# Patient Record
Sex: MALE | Race: WHITE | NOT HISPANIC OR LATINO | Employment: OTHER | URBAN - METROPOLITAN AREA
[De-identification: names, ages, dates, MRNs, and addresses within clinical notes are randomized per-mention and may not be internally consistent; named-entity substitution may affect disease eponyms.]

---

## 2017-01-04 ENCOUNTER — LAB VISIT (OUTPATIENT)
Dept: LAB | Facility: HOSPITAL | Age: 67
End: 2017-01-04
Attending: UROLOGY
Payer: COMMERCIAL

## 2017-01-04 DIAGNOSIS — R97.20 ELEVATED PSA: ICD-10-CM

## 2017-01-04 LAB — COMPLEXED PSA SERPL-MCNC: 3.2 NG/ML

## 2017-01-04 PROCEDURE — 36415 COLL VENOUS BLD VENIPUNCTURE: CPT | Mod: PO

## 2017-01-04 PROCEDURE — 84153 ASSAY OF PSA TOTAL: CPT

## 2017-01-18 ENCOUNTER — HOSPITAL ENCOUNTER (OUTPATIENT)
Dept: RADIOLOGY | Facility: HOSPITAL | Age: 67
Discharge: HOME | End: 2017-01-18
Attending: UROLOGY
Payer: COMMERCIAL

## 2017-01-18 ENCOUNTER — OFFICE VISIT (OUTPATIENT)
Dept: UROLOGY | Facility: CLINIC | Age: 67
End: 2017-01-18
Payer: COMMERCIAL

## 2017-01-18 VITALS
SYSTOLIC BLOOD PRESSURE: 124 MMHG | DIASTOLIC BLOOD PRESSURE: 76 MMHG | WEIGHT: 174.38 LBS | BODY MASS INDEX: 26.51 KG/M2

## 2017-01-18 DIAGNOSIS — Z12.5 PROSTATE CANCER SCREENING: ICD-10-CM

## 2017-01-18 DIAGNOSIS — Z12.5 PROSTATE CANCER SCREENING: Primary | ICD-10-CM

## 2017-01-18 LAB
BILIRUB SERPL-MCNC: NORMAL MG/DL
BLOOD URINE, POC: NORMAL
COLOR, POC UA: NORMAL
GLUCOSE UR QL STRIP: NORMAL
KETONES UR QL STRIP: NORMAL
LEUKOCYTE ESTERASE URINE, POC: NORMAL
NITRITE, POC UA: NORMAL
PH, POC UA: 6
PROTEIN, POC: NORMAL
SPECIFIC GRAVITY, POC UA: 1.01
UROBILINOGEN, POC UA: NORMAL

## 2017-01-18 PROCEDURE — 99214 OFFICE O/P EST MOD 30 MIN: CPT | Mod: 25,S$GLB,, | Performed by: UROLOGY

## 2017-01-18 PROCEDURE — 1126F AMNT PAIN NOTED NONE PRSNT: CPT | Mod: S$GLB,,, | Performed by: UROLOGY

## 2017-01-18 PROCEDURE — 81002 URINALYSIS NONAUTO W/O SCOPE: CPT | Mod: S$GLB,,, | Performed by: UROLOGY

## 2017-01-18 PROCEDURE — 99999 PR PBB SHADOW E&M-EST. PATIENT-LVL II: CPT | Mod: PBBFAC,,, | Performed by: UROLOGY

## 2017-01-18 PROCEDURE — 1160F RVW MEDS BY RX/DR IN RCRD: CPT | Mod: S$GLB,,, | Performed by: UROLOGY

## 2017-01-18 PROCEDURE — 1159F MED LIST DOCD IN RCRD: CPT | Mod: S$GLB,,, | Performed by: UROLOGY

## 2017-01-18 PROCEDURE — 1157F ADVNC CARE PLAN IN RCRD: CPT | Mod: S$GLB,,, | Performed by: UROLOGY

## 2017-01-18 PROCEDURE — 74000 XR ABDOMEN AP 1 VIEW: CPT | Mod: TC

## 2017-01-18 PROCEDURE — 3078F DIAST BP <80 MM HG: CPT | Mod: S$GLB,,, | Performed by: UROLOGY

## 2017-01-18 PROCEDURE — 3074F SYST BP LT 130 MM HG: CPT | Mod: S$GLB,,, | Performed by: UROLOGY

## 2017-01-18 PROCEDURE — 74000 XR ABDOMEN AP 1 VIEW: CPT | Mod: 26,,, | Performed by: RADIOLOGY

## 2017-01-18 RX ORDER — ASPIRIN 81 MG/1
81 TABLET ORAL DAILY
COMMUNITY

## 2017-01-18 NOTE — PROGRESS NOTES
Chief Complaint: Annual prostate cancer screening    HPI:   1/18/17: 65 yo pt of Dr. Valentino here for annual exam.  Feels well no complaints.  PSA has bumped at times, no biopsies.  Had a kidney stone 10 years ago he passed on his own.  Had other stones before that.    Allergies:  Review of patient's allergies indicates no known allergies.    Medications:  has a current medication list which includes the following prescription(s): aspirin, atorvastatin, gemfibrozil, and lisinopril.    Review of Systems:  General: No fever, chills, fatigability, or weight loss.  Skin: No rashes, itching, or changes in color or texture of skin.  Chest: Denies LOUISE, cyanosis, wheezing, cough, and sputum production.  Abdomen: Appetite fine. No weight loss. Denies diarrhea, abdominal pain, hematemesis, or blood in stool.  Musculoskeletal: No joint stiffness or swelling. Denies back pain.  : As above.  All other review of systems negative.    PMH:   has a past medical history of Colon polyp; Elevated PSA; HLD (hyperlipidemia); HTN (hypertension); and Kidney stone (2006).    PSH:   has a past surgical history that includes Colonoscopy (N/A, 3/28/2016).    FamHx: family history is negative for Cancer, Diabetes, Heart disease, and Prostate cancer.    SocHx:  reports that he has never smoked. He does not have any smokeless tobacco history on file. He reports that he drinks alcohol. He reports that he does not use illicit drugs.      Physical Exam:  Vitals:    01/18/17 0845   BP: 124/76     General: A&Ox3, no apparent distress, no deformities  Neck: No masses, normal thyroid  Lungs: normal inspiration, no use of accessory muscles  Heart: normal pulse, no arrhythmias  Abdomen: Soft, NT, ND  Skin: The skin is warm and dry. No jaundice.  Ext: No c/c/e.  : Test desc freddy, no abnormalities of epididymus. Penis normal, with normal penile and scrotal skin. Meatus normal. Normal rectal tone, no hemorrhoids. Prost 40 gm no nodules or masses  appreciated. SV not palpable. Perineum and anus normal.    Labs/Studies:   Urinalysis performed in clinic, summary: UA normal  PSA    1/17: 3.2    Impression/Plan:   1. Low risk of prostate cancer.  PSA/RTC 12 mo  2. KUB/US to screen for possible kidney stones.

## 2017-01-18 NOTE — MR AVS SNAPSHOT
[facility]  [address]  [place] [address]  Phone: [phone]  Fax: [phone]                  Rahul Yip   2017 8:20 AM   Office Visit    Description:  Male : 1950   Provider:  Wes Dukes IV, MD   [facility]           Diagnoses this Visit        Comments    Prostate cancer screening    -  Primary            To Do List           Future Appointments        Provider Department Dept Phone    2017 9:45 AM ONLH XR1-DR [facility] [phone]    2017 10:45 AM SUMH US1 [facility] [phone]    2018 9:30 AM LABORATORY, [place] [facility] [phone]    2018 8:40 AM Wes Dukes IV, MD [facility] [phone]      Goals (5 Years of Data)     None      Follow-Up and Disposition     Return in about 1 year (around 2018).    Follow-up and Disposition History      [facility]     [facility] Care [facility] -  Assistance  Registered nurses in the [facility] provide clinical advisement, health education, appointment booking, and other advisory services.  Call for this free service at [phone].             Medications           Message regarding Medications     Verify the changes and/or additions to your medication regime listed below are the same as discussed with your clinician today.  If any of these changes or additions are incorrect, please notify your healthcare provider.             Verify that the below list of medications is an accurate representation of the medications you are currently taking.  If none reported, the list may be blank. If incorrect, please contact your healthcare provider. Carry this list with you in case of emergency.           Current Medications     aspirin (ECOTRIN) 81 MG EC tablet Take 81 mg by mouth once daily.    atorvastatin (LIPITOR) 40 MG tablet Take 0.5 tablets (20 mg total) by mouth  once daily.    gemfibrozil (LOPID) 600 MG tablet TAKE ONE BY MOUTH TWO TIMES A DAY.    lisinopril 10 MG tablet TAKE ONE BY MOUTH EVERY DAY           Clinical Reference Information           Vital Signs - Last Recorded  Most recent update: 1/18/2017  8:45 AM by Angelina Mi LPN    BP Wt BMI          124/76 79.1 kg (174 lb 6.1 oz) 26.51 kg/m2        Blood Pressure          Most Recent Value    BP  124/76      Allergies as of 1/18/2017     No Known Allergies      Immunizations Administered on Date of Encounter - 1/18/2017     None      Orders Placed During Today's Visit      Normal Orders This Visit    POCT urine dipstick without microscope     Future Labs/Procedures Expected by Expires    US Retroperitoneal Complete (Kidney and  1/18/2017 1/18/2018    X-Ray Abdomen AP 1 View  1/18/2017 1/18/2018    PSA, Screening  1/18/2018 3/19/2018         1/18/2017  9:19 AM - Antonina Zayas LPN      Component Results     Component    Color, UA    yellow, clear    Spec Grav, UA    1.015    pH, UA    6    WBC, UA    neg    Nitrite, UA    neg    Protein, UA    neg    Glucose, UA    neg    Ketones, UA    neg    Urobilinogen, UA    neg    Bilirubin, UA    neg    Blood, UA    neg            [facility] Sign-Up     Activating your [facility] account is as easy as 1-2-3!     1) Visit [email or web address], select Sign Up Now, enter this activation code and your date of birth, then select Next.  [email or web address]  Expires: 3/4/2017  9:19 AM      2) Create a username and password to use when you visit [facility] in the future and select a security question in case you lose your password and select Next.    3) Enter your e-mail address and click Sign Up!    Additional Information  If you have questions, please e-mail [email or web address] or call [phone] to talk to our [facility] staff. Remember, [facility] is NOT to be used for urgent needs. For medical emergencies, dial 911.

## 2017-01-19 ENCOUNTER — TELEPHONE (OUTPATIENT)
Dept: RADIOLOGY | Facility: HOSPITAL | Age: 67
End: 2017-01-19

## 2017-01-20 ENCOUNTER — HOSPITAL ENCOUNTER (OUTPATIENT)
Dept: RADIOLOGY | Facility: HOSPITAL | Age: 67
Discharge: HOME | End: 2017-01-20
Attending: UROLOGY
Payer: COMMERCIAL

## 2017-01-20 DIAGNOSIS — Z12.5 PROSTATE CANCER SCREENING: ICD-10-CM

## 2017-01-20 PROCEDURE — 76770 US EXAM ABDO BACK WALL COMP: CPT | Mod: 26,,, | Performed by: RADIOLOGY

## 2017-01-20 PROCEDURE — 76770 US EXAM ABDO BACK WALL COMP: CPT | Mod: TC,PO

## 2017-01-23 ENCOUNTER — TELEPHONE (OUTPATIENT)
Dept: UROLOGY | Facility: CLINIC | Age: 67
End: 2017-01-23

## 2017-01-23 DIAGNOSIS — N13.30 HYDRONEPHROSIS, UNSPECIFIED HYDRONEPHROSIS TYPE: Primary | ICD-10-CM

## 2017-01-23 NOTE — TELEPHONE ENCOUNTER
Attempted to contact pt to inform him of US results per Dr. Dukes; pt will need CT RSS and RTC appt; no answer.  Msg left on voice mail.

## 2017-01-23 NOTE — TELEPHONE ENCOUNTER
US shows some left sided blockage but doesn't show what the cause is.  CT RSS ordered with RTC after

## 2017-02-19 RX ORDER — GEMFIBROZIL 600 MG/1
TABLET, FILM COATED ORAL
Qty: 60 TABLET | Refills: 11 | Status: SHIPPED | OUTPATIENT
Start: 2017-02-19 | End: 2017-02-20 | Stop reason: SDUPTHER

## 2017-02-20 RX ORDER — GEMFIBROZIL 600 MG/1
600 TABLET, FILM COATED ORAL
Qty: 60 TABLET | Refills: 11 | Status: SHIPPED | OUTPATIENT
Start: 2017-02-20 | End: 2018-09-20

## 2017-02-20 NOTE — TELEPHONE ENCOUNTER
----- Message from Yolanda Meza sent at 2/20/2017 11:47 AM CST -----  Contact: Gris Funes, Pharmacy  Ms Gris is requesting a refill on  Lopid 600mg, please call her back at [phone]. Thank you

## 2017-02-28 ENCOUNTER — TELEPHONE (OUTPATIENT)
Dept: RADIOLOGY | Facility: HOSPITAL | Age: 67
End: 2017-02-28

## 2017-03-01 ENCOUNTER — HOSPITAL ENCOUNTER (OUTPATIENT)
Dept: RADIOLOGY | Facility: HOSPITAL | Age: 67
Discharge: HOME | End: 2017-03-01
Attending: UROLOGY
Payer: COMMERCIAL

## 2017-03-01 DIAGNOSIS — N13.30 HYDRONEPHROSIS, UNSPECIFIED HYDRONEPHROSIS TYPE: ICD-10-CM

## 2017-03-01 PROCEDURE — 74176 CT ABD & PELVIS W/O CONTRAST: CPT | Mod: TC,PO

## 2017-03-01 PROCEDURE — 74176 CT ABD & PELVIS W/O CONTRAST: CPT | Mod: 26,,, | Performed by: RADIOLOGY

## 2017-03-17 RX ORDER — LISINOPRIL 10 MG/1
TABLET ORAL
Qty: 30 TABLET | Refills: 5 | Status: SHIPPED | OUTPATIENT
Start: 2017-03-17 | End: 2017-10-05 | Stop reason: SDUPTHER

## 2017-04-26 RX ORDER — ATORVASTATIN CALCIUM 40 MG/1
TABLET, FILM COATED ORAL
Qty: 45 TABLET | Refills: 4 | Status: SHIPPED | OUTPATIENT
Start: 2017-04-26 | End: 2018-05-30 | Stop reason: SDUPTHER

## 2017-10-06 RX ORDER — LISINOPRIL 10 MG/1
TABLET ORAL
Qty: 30 TABLET | Refills: 0 | Status: SHIPPED | OUTPATIENT
Start: 2017-10-06 | End: 2018-09-20 | Stop reason: SDUPTHER

## 2017-10-06 RX ORDER — LISINOPRIL 10 MG/1
TABLET ORAL
Qty: 30 TABLET | Refills: 0 | Status: SHIPPED | OUTPATIENT
Start: 2017-10-06 | End: 2017-11-08 | Stop reason: SDUPTHER

## 2017-11-08 RX ORDER — LISINOPRIL 10 MG/1
TABLET ORAL
Qty: 30 TABLET | Refills: 0 | Status: SHIPPED | OUTPATIENT
Start: 2017-11-08 | End: 2017-12-11 | Stop reason: SDUPTHER

## 2017-12-11 RX ORDER — LISINOPRIL 10 MG/1
TABLET ORAL
Qty: 30 TABLET | Refills: 0 | Status: SHIPPED | OUTPATIENT
Start: 2017-12-11 | End: 2018-01-18 | Stop reason: SDUPTHER

## 2018-01-18 RX ORDER — LISINOPRIL 10 MG/1
TABLET ORAL
Qty: 30 TABLET | Refills: 0 | Status: SHIPPED | OUTPATIENT
Start: 2018-01-18 | End: 2018-02-20 | Stop reason: SDUPTHER

## 2018-01-19 ENCOUNTER — LAB VISIT (OUTPATIENT)
Dept: LAB | Facility: HOSPITAL | Age: 68
End: 2018-01-19
Attending: UROLOGY
Payer: COMMERCIAL

## 2018-01-19 DIAGNOSIS — Z12.5 PROSTATE CANCER SCREENING: ICD-10-CM

## 2018-01-19 LAB — COMPLEXED PSA SERPL-MCNC: 5.3 NG/ML

## 2018-01-19 PROCEDURE — 36415 COLL VENOUS BLD VENIPUNCTURE: CPT | Mod: PO

## 2018-01-19 PROCEDURE — 84153 ASSAY OF PSA TOTAL: CPT

## 2018-01-24 ENCOUNTER — OFFICE VISIT (OUTPATIENT)
Dept: UROLOGY | Facility: CLINIC | Age: 68
End: 2018-01-24
Payer: COMMERCIAL

## 2018-01-24 ENCOUNTER — LAB VISIT (OUTPATIENT)
Dept: LAB | Facility: HOSPITAL | Age: 68
End: 2018-01-24
Attending: UROLOGY
Payer: COMMERCIAL

## 2018-01-24 VITALS — WEIGHT: 175 LBS | SYSTOLIC BLOOD PRESSURE: 124 MMHG | DIASTOLIC BLOOD PRESSURE: 78 MMHG | BODY MASS INDEX: 26.61 KG/M2

## 2018-01-24 DIAGNOSIS — N13.30 HYDRONEPHROSIS, UNSPECIFIED HYDRONEPHROSIS TYPE: ICD-10-CM

## 2018-01-24 DIAGNOSIS — Z12.5 PROSTATE CANCER SCREENING: Primary | ICD-10-CM

## 2018-01-24 DIAGNOSIS — Z12.5 PROSTATE CANCER SCREENING: ICD-10-CM

## 2018-01-24 LAB
BILIRUB SERPL-MCNC: NORMAL MG/DL
BLOOD URINE, POC: NORMAL
COLOR, POC UA: YELLOW
COMPLEXED PSA SERPL-MCNC: 4.9 NG/ML
CREAT SERPL-MCNC: 1 MG/DL
EST. GFR  (AFRICAN AMERICAN): >60 ML/MIN/1.73 M^2
EST. GFR  (NON AFRICAN AMERICAN): >60 ML/MIN/1.73 M^2
GLUCOSE UR QL STRIP: NORMAL
KETONES UR QL STRIP: NORMAL
LEUKOCYTE ESTERASE URINE, POC: NORMAL
NITRITE, POC UA: NORMAL
PH, POC UA: 6
PROTEIN, POC: NORMAL
SPECIFIC GRAVITY, POC UA: 1.01
UROBILINOGEN, POC UA: NORMAL

## 2018-01-24 PROCEDURE — 81002 URINALYSIS NONAUTO W/O SCOPE: CPT | Mod: S$GLB,,, | Performed by: UROLOGY

## 2018-01-24 PROCEDURE — 36415 COLL VENOUS BLD VENIPUNCTURE: CPT

## 2018-01-24 PROCEDURE — 99214 OFFICE O/P EST MOD 30 MIN: CPT | Mod: 25,S$GLB,, | Performed by: UROLOGY

## 2018-01-24 PROCEDURE — 99999 PR PBB SHADOW E&M-EST. PATIENT-LVL II: CPT | Mod: PBBFAC,,, | Performed by: UROLOGY

## 2018-01-24 PROCEDURE — 82565 ASSAY OF CREATININE: CPT

## 2018-01-24 PROCEDURE — 84153 ASSAY OF PSA TOTAL: CPT

## 2018-01-24 NOTE — PROGRESS NOTES
Chief Complaint: Annual prostate cancer screening    HPI:   1/24/18: CT showed a left hydronephrosis with no obstructing UPJ stone, possibly congenital, possibly changes from stones.  No sig stones now to treat.  1/18/17: 65 yo pt of Dr. Valentino here for annual exam.  Feels well no complaints.  PSA has bumped at times, no biopsies.  Had a kidney stone 10 years ago he passed on his own.  Had other stones before that.    Allergies:  Patient has no known allergies.    Medications:  has a current medication list which includes the following prescription(s): aspirin, atorvastatin, gemfibrozil, lisinopril, and lisinopril.    Review of Systems:  General: No fever, chills, fatigability, or weight loss.  Skin: No rashes, itching, or changes in color or texture of skin.  Chest: Denies LOUISE, cyanosis, wheezing, cough, and sputum production.  Abdomen: Appetite fine. No weight loss. Denies diarrhea, abdominal pain, hematemesis, or blood in stool.  Musculoskeletal: No joint stiffness or swelling. Denies back pain.  : As above.  All other review of systems negative.    PMH:   has a past medical history of Colon polyp; Elevated PSA; HLD (hyperlipidemia); HTN (hypertension); and Kidney stone (2006).    PSH:   has a past surgical history that includes Colonoscopy (N/A, 3/28/2016).    FamHx: family history is not on file.    SocHx:  reports that he has never smoked. He does not have any smokeless tobacco history on file. He reports that he drinks alcohol. He reports that he does not use drugs.      Physical Exam:  Vitals:    01/24/18 0840   BP: 124/78     General: A&Ox3, no apparent distress, no deformities  Neck: No masses, normal thyroid  Lungs: normal inspiration, no use of accessory muscles  Heart: normal pulse, no arrhythmias  Abdomen: Soft, NT, ND  Skin: The skin is warm and dry. No jaundice.  Ext: No c/c/e.  : Test desc freddy, no abnormalities of epididymus. Penis normal, with normal penile and scrotal skin. Meatus normal.  Normal rectal tone, no hemorrhoids. Prost 40 gm no nodules or masses appreciated. SV not palpable. Perineum and anus normal.    Labs/Studies:   Urinalysis performed in clinic, summary: UA normal  PSA    1/17: 3.2    1/18: PSA 5.3    Impression/Plan:   1. Higher risk of prostate cancer.  PSA/RTC 1 mo  2. CT Abdomen to re-eval left kidney to see if any changes present.  3. Review both next visit

## 2018-02-16 ENCOUNTER — TELEPHONE (OUTPATIENT)
Dept: RADIOLOGY | Facility: HOSPITAL | Age: 68
End: 2018-02-16

## 2018-02-19 ENCOUNTER — HOSPITAL ENCOUNTER (OUTPATIENT)
Dept: RADIOLOGY | Facility: HOSPITAL | Age: 68
Discharge: HOME | End: 2018-02-19
Attending: UROLOGY
Payer: COMMERCIAL

## 2018-02-19 DIAGNOSIS — Z12.5 PROSTATE CANCER SCREENING: ICD-10-CM

## 2018-02-19 DIAGNOSIS — N13.30 HYDRONEPHROSIS, UNSPECIFIED HYDRONEPHROSIS TYPE: ICD-10-CM

## 2018-02-19 PROCEDURE — 74178 CT ABD&PLV WO CNTR FLWD CNTR: CPT | Mod: TC,PO

## 2018-02-19 PROCEDURE — 74178 CT ABD&PLV WO CNTR FLWD CNTR: CPT | Mod: 26,,, | Performed by: RADIOLOGY

## 2018-02-19 PROCEDURE — 25500020 PHARM REV CODE 255: Mod: PO | Performed by: UROLOGY

## 2018-02-19 RX ADMIN — IOHEXOL 120 ML: 350 INJECTION, SOLUTION INTRAVENOUS at 10:02

## 2018-02-21 ENCOUNTER — HOSPITAL ENCOUNTER (OUTPATIENT)
Dept: RADIOLOGY | Facility: HOSPITAL | Age: 68
Discharge: HOME | End: 2018-02-21
Attending: UROLOGY
Payer: COMMERCIAL

## 2018-02-21 ENCOUNTER — OFFICE VISIT (OUTPATIENT)
Dept: UROLOGY | Facility: CLINIC | Age: 68
End: 2018-02-21
Payer: COMMERCIAL

## 2018-02-21 VITALS
DIASTOLIC BLOOD PRESSURE: 64 MMHG | HEART RATE: 76 BPM | BODY MASS INDEX: 25.56 KG/M2 | WEIGHT: 168.63 LBS | HEIGHT: 68 IN | SYSTOLIC BLOOD PRESSURE: 112 MMHG

## 2018-02-21 DIAGNOSIS — N13.5 UPJ OBSTRUCTION, ACQUIRED: Primary | ICD-10-CM

## 2018-02-21 LAB
BILIRUB SERPL-MCNC: NORMAL MG/DL
BLOOD URINE, POC: NORMAL
COLOR, POC UA: YELLOW
GLUCOSE UR QL STRIP: NORMAL
KETONES UR QL STRIP: NORMAL
LEUKOCYTE ESTERASE URINE, POC: NORMAL
NITRITE, POC UA: NORMAL
PH, POC UA: 6
PROTEIN, POC: NORMAL
SPECIFIC GRAVITY, POC UA: 1.01
UROBILINOGEN, POC UA: NORMAL

## 2018-02-21 PROCEDURE — 99999 PR PBB SHADOW E&M-EST. PATIENT-LVL III: CPT | Mod: PBBFAC,,, | Performed by: UROLOGY

## 2018-02-21 PROCEDURE — 81002 URINALYSIS NONAUTO W/O SCOPE: CPT | Mod: S$GLB,,, | Performed by: UROLOGY

## 2018-02-21 PROCEDURE — 93000 ELECTROCARDIOGRAM COMPLETE: CPT | Mod: S$GLB,,, | Performed by: INTERNAL MEDICINE

## 2018-02-21 PROCEDURE — 1126F AMNT PAIN NOTED NONE PRSNT: CPT | Mod: S$GLB,,, | Performed by: UROLOGY

## 2018-02-21 PROCEDURE — 71046 X-RAY EXAM CHEST 2 VIEWS: CPT | Mod: 26,,, | Performed by: RADIOLOGY

## 2018-02-21 PROCEDURE — 3008F BODY MASS INDEX DOCD: CPT | Mod: S$GLB,,, | Performed by: UROLOGY

## 2018-02-21 PROCEDURE — 99214 OFFICE O/P EST MOD 30 MIN: CPT | Mod: 25,S$GLB,, | Performed by: UROLOGY

## 2018-02-21 PROCEDURE — 1159F MED LIST DOCD IN RCRD: CPT | Mod: S$GLB,,, | Performed by: UROLOGY

## 2018-02-21 PROCEDURE — 71046 X-RAY EXAM CHEST 2 VIEWS: CPT | Mod: TC

## 2018-02-21 RX ORDER — LISINOPRIL 10 MG/1
TABLET ORAL
Qty: 30 TABLET | Refills: 0 | Status: SHIPPED | OUTPATIENT
Start: 2018-02-21 | End: 2018-02-21 | Stop reason: SDUPTHER

## 2018-02-21 NOTE — PROGRESS NOTES
Chief Complaint: Annual prostate cancer screening    HPI:   2/21/18: CT Urogram shows a high inserting ureter and obvious poor left function.  No way to say when this happened.  1/24/18: CT showed a left hydronephrosis with no obstructing UPJ stone, possibly congenital, possibly changes from stones.  No sig stones now to treat.  1/18/17: 65 yo pt of Dr. Valentino here for annual exam.  Feels well no complaints.  PSA has bumped at times, no biopsies.  Had a kidney stone 10 years ago he passed on his own.  Had other stones before that.    Allergies:  Patient has no known allergies.    Medications:  has a current medication list which includes the following prescription(s): aspirin, atorvastatin, gemfibrozil, and lisinopril.    Review of Systems:  General: No fever, chills, fatigability, or weight loss.  Skin: No rashes, itching, or changes in color or texture of skin.  Chest: Denies LOUISE, cyanosis, wheezing, cough, and sputum production.  Abdomen: Appetite fine. No weight loss. Denies diarrhea, abdominal pain, hematemesis, or blood in stool.  Musculoskeletal: No joint stiffness or swelling. Denies back pain.  : As above.  All other review of systems negative.    PMH:   has a past medical history of Colon polyp; Elevated PSA; HLD (hyperlipidemia); HTN (hypertension); and Kidney stone (2006).    PSH:   has a past surgical history that includes Colonoscopy (N/A, 3/28/2016).    FamHx: family history is not on file.    SocHx:  reports that he has never smoked. He does not have any smokeless tobacco history on file. He reports that he drinks alcohol. He reports that he does not use drugs.      Physical Exam:  Vitals:    02/21/18 0920   BP: 112/64   Pulse: 76     General: A&Ox3, no apparent distress, no deformities  Neck: No masses, normal thyroid  Lungs: normal inspiration, no use of accessory muscles  Heart: normal pulse, no arrhythmias  Abdomen: Soft, NT, ND  Skin: The skin is warm and dry. No jaundice.  Ext: No c/c/e.  :  Test desc freddy, no abnormalities of epididymus. Penis normal, with normal penile and scrotal skin. Meatus normal. Normal rectal tone, no hemorrhoids. Prost 40 gm no nodules or masses appreciated. SV not palpable. Perineum and anus normal.    Labs/Studies:   Urinalysis performed in clinic, summary: UA normal  PSA    1/17: 3.2    1/18: PSA 5.3, 4.9    Impression/Plan:   1. Higher risk of prostate cancer.  Reviewed PSA and will plan to recheck 3 mo.  2. Reviewed CT findings and will get Mag3 and perform left URS to assure no ureteral masses.  Risks/benefits reviewed, consents on chart.

## 2018-02-26 ENCOUNTER — HOSPITAL ENCOUNTER (OUTPATIENT)
Dept: RADIOLOGY | Facility: HOSPITAL | Age: 68
Discharge: HOME | End: 2018-02-26
Attending: UROLOGY
Payer: COMMERCIAL

## 2018-02-26 DIAGNOSIS — N13.5 UPJ OBSTRUCTION, ACQUIRED: ICD-10-CM

## 2018-02-26 PROCEDURE — 63600175 PHARM REV CODE 636 W HCPCS: Performed by: UROLOGY

## 2018-02-26 PROCEDURE — A9562 TC99M MERTIATIDE: HCPCS

## 2018-02-26 RX ORDER — FUROSEMIDE 10 MG/ML
40 INJECTION INTRAMUSCULAR; INTRAVENOUS ONCE
Status: COMPLETED | OUTPATIENT
Start: 2018-02-26 | End: 2018-02-26

## 2018-02-26 RX ADMIN — FUROSEMIDE 40 MG: 10 INJECTION, SOLUTION INTRAMUSCULAR; INTRAVENOUS at 10:02

## 2018-03-05 ENCOUNTER — ANESTHESIA EVENT (OUTPATIENT)
Dept: SURGERY | Facility: HOSPITAL | Age: 68
End: 2018-03-05
Payer: COMMERCIAL

## 2018-03-05 ENCOUNTER — TELEPHONE (OUTPATIENT)
Dept: UROLOGY | Facility: CLINIC | Age: 68
End: 2018-03-05

## 2018-03-05 NOTE — PRE ADMISSION SCREENING
Pre op instructions reviewed with patient per phone:    To confirm, Your surgeon has instructed you:  Surgery is scheduled 3/6/2018 at [facility].      [facility] main lobby by [facility].   Pre admit office to call afternoon prior to surgery with final arrival time      INSTRUCTIONS IMPORTANT!!!  ¨ Do not eat, drink, or smoke after 12 midnight-including water. OK to brush teeth, no gum, candy or mints!    ¨ Take only these medicines with a small swallow of water-morning of surgery.  Lipitor, Lisinopril    ____  Do not wear makeup, including mascara.  ____  No powder, lotions or creams to surgical area.  ____  Please remove all jewelry, including piercings and leave at home.  ____  No money or valuables needed. Please leave at home.  ____  Please bring identification and insurance information to hospital.  ____  If going home the same day, arrange for a ride home. You will not be able to   drive if Anesthesia was used.  ____  Children, under 12 years old, must remain in the waiting room with an adult.  They are not allowed in patient areas.  ____  Wear loose fitting clothing. Allow for dressings, bandages.  ____  Stop Aspirin, Ibuprofen, Motrin and Aleve at least 5-7 days before surgery, unless otherwise instructed by your doctor, or the nurse.   You MAY use Tylenol/acetaminophen until day of surgery.  ____  If you take diabetic medication, do not take am of surgery unless instructed by   Doctor.  ____ Stop taking any Fish Oil supplement or any Vitamins that contain Vitamin E at least 5 days prior to surgery.          Bathing Instructions-- The night before surgery and the morning prior to coming to the hospital:   -Do not shave the surgical area.   -Shower and wash your hair and body as usual with anti-bacterial  soap and shampoo.   -Rinse your hair and body completely.   -Use one packet of hibiclens to wash the surgical site (using your hand) gently for 5 minutes.  Do not  scrub you skin too hard.   -Do not use hibiclens on your head, face, or genitals.   -Do not wash with anti-bacterial soap after you use the hibiclens.   -Rinse your body thoroughly.   -Dry with clean, soft towel.  Do not use lotion, cream, deodorant, or powders on   the surgical site.    Use antibacterial soap in place of hibiclens if your surgery is on the head, face or genitals.         Surgical Site Infection    Prevention of surgical site infections:     -Keep incisions clean and dry.   -Do not soak/submerge incisions in water until completely healed.   -Do not apply lotions, powders, creams, or deodorants to site.   -Always make sure hands are cleaned with antibacterial soap/ alcohol-based   prior to touching the surgical site.  (This includes doctors, nurses, staff, and yourself.)    Signs and symptoms:   -Redness and pain around the area where you had surgery   -Drainage of cloudy fluid from your surgical wound   -Fever over 100.4  I have read or had read and explained to me, and understand the above information.

## 2018-03-06 ENCOUNTER — HOSPITAL ENCOUNTER (OUTPATIENT)
Facility: HOSPITAL | Age: 68
Discharge: HOME | End: 2018-03-06
Attending: UROLOGY | Admitting: UROLOGY
Payer: COMMERCIAL

## 2018-03-06 ENCOUNTER — TELEPHONE (OUTPATIENT)
Dept: UROLOGY | Facility: CLINIC | Age: 68
End: 2018-03-06

## 2018-03-06 ENCOUNTER — ANESTHESIA (OUTPATIENT)
Dept: SURGERY | Facility: HOSPITAL | Age: 68
End: 2018-03-06
Payer: COMMERCIAL

## 2018-03-06 ENCOUNTER — SURGERY (OUTPATIENT)
Age: 68
End: 2018-03-06

## 2018-03-06 VITALS
TEMPERATURE: 98 F | RESPIRATION RATE: 16 BRPM | DIASTOLIC BLOOD PRESSURE: 70 MMHG | OXYGEN SATURATION: 97 % | HEIGHT: 68 IN | BODY MASS INDEX: 25.59 KG/M2 | WEIGHT: 168.88 LBS | SYSTOLIC BLOOD PRESSURE: 126 MMHG | HEART RATE: 76 BPM

## 2018-03-06 DIAGNOSIS — N13.5 UPJ OBSTRUCTION, ACQUIRED: ICD-10-CM

## 2018-03-06 PROCEDURE — 37000009 HC ANESTHESIA EA ADD 15 MINS: Performed by: UROLOGY

## 2018-03-06 PROCEDURE — 76000 FLUOROSCOPY <1 HR PHYS/QHP: CPT | Mod: 26,59,, | Performed by: UROLOGY

## 2018-03-06 PROCEDURE — 52351 CYSTOURETERO & OR PYELOSCOPE: CPT | Mod: LT,,, | Performed by: UROLOGY

## 2018-03-06 PROCEDURE — C2617 STENT, NON-COR, TEM W/O DEL: HCPCS | Performed by: UROLOGY

## 2018-03-06 PROCEDURE — 52332 CYSTOSCOPY AND TREATMENT: CPT | Mod: 51,LT,, | Performed by: UROLOGY

## 2018-03-06 PROCEDURE — 36000707: Performed by: UROLOGY

## 2018-03-06 PROCEDURE — C1769 GUIDE WIRE: HCPCS | Performed by: UROLOGY

## 2018-03-06 PROCEDURE — 63600175 PHARM REV CODE 636 W HCPCS: Performed by: NURSE ANESTHETIST, CERTIFIED REGISTERED

## 2018-03-06 PROCEDURE — C1894 INTRO/SHEATH, NON-LASER: HCPCS | Performed by: UROLOGY

## 2018-03-06 PROCEDURE — 37000008 HC ANESTHESIA 1ST 15 MINUTES: Performed by: UROLOGY

## 2018-03-06 PROCEDURE — 25000003 PHARM REV CODE 250: Performed by: NURSE ANESTHETIST, CERTIFIED REGISTERED

## 2018-03-06 PROCEDURE — 71000033 HC RECOVERY, INTIAL HOUR: Performed by: UROLOGY

## 2018-03-06 PROCEDURE — 71000015 HC POSTOP RECOV 1ST HR: Performed by: UROLOGY

## 2018-03-06 PROCEDURE — 25000003 PHARM REV CODE 250: Performed by: ANESTHESIOLOGY

## 2018-03-06 PROCEDURE — 36000706: Performed by: UROLOGY

## 2018-03-06 DEVICE — STENT URETERAL UNIV 6FR 24CM: Type: IMPLANTABLE DEVICE | Site: URETER | Status: FUNCTIONAL

## 2018-03-06 RX ORDER — OXYCODONE AND ACETAMINOPHEN 5; 325 MG/1; MG/1
1-2 TABLET ORAL EVERY 4 HOURS PRN
Qty: 30 TABLET | Refills: 0 | Status: SHIPPED | OUTPATIENT
Start: 2018-03-06 | End: 2018-09-20 | Stop reason: ALTCHOICE

## 2018-03-06 RX ORDER — MEPERIDINE HYDROCHLORIDE 50 MG/ML
12.5 INJECTION INTRAMUSCULAR; INTRAVENOUS; SUBCUTANEOUS ONCE AS NEEDED
Status: DISCONTINUED | OUTPATIENT
Start: 2018-03-06 | End: 2018-03-06 | Stop reason: HOSPADM

## 2018-03-06 RX ORDER — SODIUM CHLORIDE, SODIUM LACTATE, POTASSIUM CHLORIDE, CALCIUM CHLORIDE 600; 310; 30; 20 MG/100ML; MG/100ML; MG/100ML; MG/100ML
INJECTION, SOLUTION INTRAVENOUS CONTINUOUS
Status: DISCONTINUED | OUTPATIENT
Start: 2018-03-06 | End: 2018-03-06 | Stop reason: HOSPADM

## 2018-03-06 RX ORDER — MORPHINE SULFATE 2 MG/ML
2 INJECTION, SOLUTION INTRAMUSCULAR; INTRAVENOUS EVERY 5 MIN PRN
Status: DISCONTINUED | OUTPATIENT
Start: 2018-03-06 | End: 2018-03-06 | Stop reason: HOSPADM

## 2018-03-06 RX ORDER — OXYCODONE HYDROCHLORIDE 5 MG/1
5 TABLET ORAL
Status: DISCONTINUED | OUTPATIENT
Start: 2018-03-06 | End: 2018-03-06 | Stop reason: HOSPADM

## 2018-03-06 RX ORDER — SUCCINYLCHOLINE CHLORIDE 20 MG/ML
INJECTION INTRAMUSCULAR; INTRAVENOUS
Status: DISCONTINUED | OUTPATIENT
Start: 2018-03-06 | End: 2018-03-06

## 2018-03-06 RX ORDER — METOCLOPRAMIDE HYDROCHLORIDE 5 MG/ML
10 INJECTION INTRAMUSCULAR; INTRAVENOUS EVERY 10 MIN PRN
Status: DISCONTINUED | OUTPATIENT
Start: 2018-03-06 | End: 2018-03-06 | Stop reason: HOSPADM

## 2018-03-06 RX ORDER — SODIUM CHLORIDE 0.9 % (FLUSH) 0.9 %
3 SYRINGE (ML) INJECTION EVERY 8 HOURS
Status: DISCONTINUED | OUTPATIENT
Start: 2018-03-06 | End: 2018-03-06 | Stop reason: HOSPADM

## 2018-03-06 RX ORDER — CIPROFLOXACIN 500 MG/1
500 TABLET ORAL EVERY 12 HOURS
Qty: 6 TABLET | Refills: 0 | Status: SHIPPED | OUTPATIENT
Start: 2018-03-06 | End: 2018-03-09

## 2018-03-06 RX ORDER — PHENYLEPHRINE HYDROCHLORIDE 10 MG/ML
INJECTION INTRAVENOUS
Status: DISCONTINUED | OUTPATIENT
Start: 2018-03-06 | End: 2018-03-06

## 2018-03-06 RX ORDER — HYDROCODONE BITARTRATE AND ACETAMINOPHEN 10; 325 MG/1; MG/1
1 TABLET ORAL EVERY 4 HOURS PRN
Status: DISCONTINUED | OUTPATIENT
Start: 2018-03-06 | End: 2018-03-06 | Stop reason: HOSPADM

## 2018-03-06 RX ORDER — ONDANSETRON 2 MG/ML
INJECTION INTRAMUSCULAR; INTRAVENOUS
Status: DISCONTINUED | OUTPATIENT
Start: 2018-03-06 | End: 2018-03-06

## 2018-03-06 RX ORDER — CEFAZOLIN SODIUM 2 G/50ML
2 SOLUTION INTRAVENOUS
Status: DISCONTINUED | OUTPATIENT
Start: 2018-03-06 | End: 2018-03-06 | Stop reason: HOSPADM

## 2018-03-06 RX ORDER — LIDOCAINE HYDROCHLORIDE 10 MG/ML
1 INJECTION, SOLUTION EPIDURAL; INFILTRATION; INTRACAUDAL; PERINEURAL ONCE
Status: DISCONTINUED | OUTPATIENT
Start: 2018-03-06 | End: 2018-03-06 | Stop reason: HOSPADM

## 2018-03-06 RX ORDER — DOCUSATE SODIUM 100 MG/1
100 CAPSULE, LIQUID FILLED ORAL 2 TIMES DAILY
Qty: 60 CAPSULE | Refills: 0 | Status: SHIPPED | OUTPATIENT
Start: 2018-03-06

## 2018-03-06 RX ORDER — MORPHINE SULFATE 4 MG/ML
2 INJECTION, SOLUTION INTRAMUSCULAR; INTRAVENOUS EVERY 5 MIN PRN
Status: DISCONTINUED | OUTPATIENT
Start: 2018-03-06 | End: 2018-03-06 | Stop reason: SDUPTHER

## 2018-03-06 RX ORDER — PROPOFOL 10 MG/ML
VIAL (ML) INTRAVENOUS
Status: DISCONTINUED | OUTPATIENT
Start: 2018-03-06 | End: 2018-03-06

## 2018-03-06 RX ORDER — LIDOCAINE HYDROCHLORIDE 20 MG/ML
INJECTION, SOLUTION EPIDURAL; INFILTRATION; INTRACAUDAL; PERINEURAL
Status: DISCONTINUED | OUTPATIENT
Start: 2018-03-06 | End: 2018-03-06

## 2018-03-06 RX ORDER — SODIUM CHLORIDE 0.9 % (FLUSH) 0.9 %
3 SYRINGE (ML) INJECTION
Status: DISCONTINUED | OUTPATIENT
Start: 2018-03-06 | End: 2018-03-06 | Stop reason: HOSPADM

## 2018-03-06 RX ORDER — FENTANYL CITRATE 50 UG/ML
INJECTION, SOLUTION INTRAMUSCULAR; INTRAVENOUS
Status: DISCONTINUED | OUTPATIENT
Start: 2018-03-06 | End: 2018-03-06

## 2018-03-06 RX ORDER — FUROSEMIDE 10 MG/ML
INJECTION INTRAMUSCULAR; INTRAVENOUS
Status: DISCONTINUED | OUTPATIENT
Start: 2018-03-06 | End: 2018-03-06

## 2018-03-06 RX ORDER — DEXAMETHASONE SODIUM PHOSPHATE 4 MG/ML
INJECTION, SOLUTION INTRA-ARTICULAR; INTRALESIONAL; INTRAMUSCULAR; INTRAVENOUS; SOFT TISSUE
Status: DISCONTINUED | OUTPATIENT
Start: 2018-03-06 | End: 2018-03-06

## 2018-03-06 RX ORDER — HYDROCODONE BITARTRATE AND ACETAMINOPHEN 5; 325 MG/1; MG/1
1 TABLET ORAL EVERY 4 HOURS PRN
Status: DISCONTINUED | OUTPATIENT
Start: 2018-03-06 | End: 2018-03-06 | Stop reason: HOSPADM

## 2018-03-06 RX ORDER — TAMSULOSIN HYDROCHLORIDE 0.4 MG/1
0.4 CAPSULE ORAL DAILY
Qty: 30 CAPSULE | Refills: 1 | Status: SHIPPED | OUTPATIENT
Start: 2018-03-06 | End: 2018-09-20 | Stop reason: ALTCHOICE

## 2018-03-06 RX ADMIN — PHENYLEPHRINE HYDROCHLORIDE 100 MCG: 10 INJECTION INTRAVENOUS at 12:03

## 2018-03-06 RX ADMIN — ONDANSETRON 4 MG: 2 INJECTION, SOLUTION INTRAMUSCULAR; INTRAVENOUS at 11:03

## 2018-03-06 RX ADMIN — FUROSEMIDE 10 MG: 10 INJECTION, SOLUTION INTRAMUSCULAR; INTRAVENOUS at 12:03

## 2018-03-06 RX ADMIN — PHENYLEPHRINE HYDROCHLORIDE 200 MCG: 10 INJECTION INTRAVENOUS at 12:03

## 2018-03-06 RX ADMIN — LIDOCAINE HYDROCHLORIDE 80 MG: 20 INJECTION, SOLUTION EPIDURAL; INFILTRATION; INTRACAUDAL; PERINEURAL at 11:03

## 2018-03-06 RX ADMIN — SODIUM CHLORIDE, SODIUM LACTATE, POTASSIUM CHLORIDE, AND CALCIUM CHLORIDE: 600; 310; 30; 20 INJECTION, SOLUTION INTRAVENOUS at 11:03

## 2018-03-06 RX ADMIN — DEXAMETHASONE SODIUM PHOSPHATE 4 MG: 4 INJECTION, SOLUTION INTRA-ARTICULAR; INTRALESIONAL; INTRAMUSCULAR; INTRAVENOUS; SOFT TISSUE at 11:03

## 2018-03-06 RX ADMIN — PROPOFOL 140 MG: 10 INJECTION, EMULSION INTRAVENOUS at 11:03

## 2018-03-06 RX ADMIN — PHENYLEPHRINE HYDROCHLORIDE 200 MCG: 10 INJECTION INTRAVENOUS at 11:03

## 2018-03-06 RX ADMIN — SUCCINYLCHOLINE CHLORIDE 120 MG: 20 INJECTION, SOLUTION INTRAMUSCULAR; INTRAVENOUS at 11:03

## 2018-03-06 RX ADMIN — FENTANYL CITRATE 100 MCG: 50 INJECTION, SOLUTION INTRAMUSCULAR; INTRAVENOUS at 11:03

## 2018-03-06 NOTE — INTERVAL H&P NOTE
The patient has been examined and the H&P has been reviewed:    I concur with the findings and changes have been noted since the H&P was written: Mag3 shows 41/59 L/R functino with a 16 min t1/2 on the left.  URS with stent placement and plan for pyeloplasty is the current plan.      Anesthesia/Surgery risks, benefits and alternative options discussed and understood by patient/family.          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

## 2018-03-06 NOTE — DISCHARGE INSTRUCTIONS
General Information:    1. Do not drink alcoholic beverages including beer for 24 hours or as long as you are on pain medication..  2. Do not drive a motor vehicle, operate machinery or power tools, or signs legal papers for 24 hours or as long as you are on pain medication.   3. You may experience light-headedness, dizziness, and sleepiness following surgery. Please do not stay alone. A responsible adult should be with you for this 24 hour period.  4. Go home and rest.  5. Progress slowly to a normal diet unless instructed.  Otherwise, begin with liquids such as soft drinks, then soup and crackers working up to solid foods. Drink plenty of nonalcoholic fluids.  6. Certain anesthetics and pain medications produce nausea and vomiting in certain individuals. If nausea becomes a problem at home, call you doctor.  7. A nurse will be calling you sometime after surgery. Do not be alarmed. This is our way of finding out how you are doing.  8. Several times every hour while you are awake, take 2-3 deep breaths and cough. If you had stomach surgery hold a pillow or rolled towel firmly against your stomach before you cough. This will help with any pain the cough might cause.  9. Several times every hour while you are awake, pump and flex your feet 5-6 times and do foot circles. This will help prevent blood clots.  10. Call your doctor for severe pain, bleeding, fever, or signs or symptoms of infection (pain, swelling, redness, foul odor, drainage).  11. You can contact your doctor anytime by [phone] for the [facility] (at [facility]) or [phone] for the [facility] on [facility].   [email or web address] is another way to contact your doctor if you are an active participant online with [facility].  12. Continue Nozin provided at discharge twice daily for 7 days or until the incision is healed.  See pamphlet or box for instructions.

## 2018-03-06 NOTE — ANESTHESIA PREPROCEDURE EVALUATION
03/06/2018  Rahul FOSTER Parent is a 67 y.o., male.    Anesthesia Evaluation    I have reviewed the Patient Summary Reports.    I have reviewed the Nursing Notes.   I have reviewed the Medications.     Review of Systems  Anesthesia Hx:  Denies Family Hx of Anesthesia complications.   Denies Personal Hx of Anesthesia complications.   Social:  Non-Smoker    Cardiovascular:   Hypertension    Renal/:   Chronic Renal Disease        Physical Exam  General:  Well nourished    Airway/Jaw/Neck:  Airway Findings: Mouth Opening: Normal Tongue: Normal  Mallampati: II      Dental:  DENTAL FINDINGS: Normal   Chest/Lungs:  Chest/Lungs Findings: Normal Respiratory Rate     Heart/Vascular:  Heart Findings: Normal            Anesthesia Plan  Type of Anesthesia, risks & benefits discussed:  Anesthesia Type:  general  Patient's Preference:   Intra-op Monitoring Plan:   Intra-op Monitoring Plan Comments:   Post Op Pain Control Plan:   Post Op Pain Control Plan Comments:   Induction:   IV  Beta Blocker:  Patient is not currently on a Beta-Blocker (No further documentation required).       Informed Consent: Patient understands risks and agrees with Anesthesia plan.  Questions answered. Anesthesia consent signed with patient.  ASA Score: 2     Day of Surgery Review of History & Physical: I have interviewed and examined the patient. I have reviewed the patient's H&P dated:  There are no significant changes.          Ready For Surgery From Anesthesia Perspective.

## 2018-03-06 NOTE — DISCHARGE SUMMARY
Date of Discharge: 03/06/2018     Principle Diagnosis: Left UPJ Obstruction    Secondary Diagnosis:  has a past medical history of Anticoagulant long-term use; Colon polyp; Elevated PSA; HLD (hyperlipidemia); HTN (hypertension); and Kidney stone (2006).    History of Present Illness: Pt was worked up in clinic and today's procedure was scheduled.  Please see H&P for full details.    Hospital Course: Pt presented on the day of surgery and after proper consents were obtained he was brought to the OR where his procedure was performed without difficulty.    Discharge Disposition: Home    Followup Plan:  1. Pt is provided with medications for pain and others as indicated.  2. RTC in 1 weeks

## 2018-03-06 NOTE — TRANSFER OF CARE
"Anesthesia Transfer of Care Note    Patient: Rahul Yip    Procedure(s) Performed: Procedure(s) (LRB):  URETEROSCOPY (Left)  CYSTOSCOPY WITH STENT PLACEMENT (Left)    Patient location: PACU    Anesthesia Type: general    Transport from OR: Transported from OR on room air with adequate spontaneous ventilation    Post pain: adequate analgesia    Post assessment: no apparent anesthetic complications    Post vital signs: stable    Level of consciousness: awake    Nausea/Vomiting: no nausea/vomiting    Complications: none    Transfer of care protocol was followed      Last vitals:   Visit Vitals  /80 (BP Location: Right arm, Patient Position: Sitting)   Pulse 60   Temp 36.4 °C (97.5 °F) (Tympanic)   Resp 18   Ht 5' 8" (1.727 m)   Wt 76.6 kg (168 lb 14 oz)   SpO2 100%   BMI 25.68 kg/m²     "

## 2018-03-06 NOTE — OP NOTE
Date of Procedure: 03/06/2018    PREOPERATIVE DIAGNOSIS:  Left UPJ Obstruction                                                                                                          POSTOPERATIVE DIAGNOSIS:  Same                               PROCEDURES:                                                                  1.  Left diagnostic ureteroscopy                        2.  Cystoscopy with placement of left double-J ureteral stent.              3.  Fluoro less than 1 hour.                                                 SURGEON:  Adan Dukes IV, M.D.                                          Assistants: None    Specimen: None    Prosthetics, Devices, Grafts: None    ANESTHESIA:  General endotracheal.                                           FINDINGS:  The patient has a high-inserting UPJ obstruction at the left renal pelvis.  No tumors were observed.  A 6-Sammarinese x 24 cm double-J ureteral stent was placed. Flouroscopy was used throughout the case for wire and scope guidance, and if applicable to check final stent placement.                                    BLOOD LOSS:  None.                                                           BLOOD GIVEN:  None.                                                          URINE OUTPUT:  None.                                                         DRAINS:  6-Sammarinese x 24 cm left double-J ureteral stent.                      PROCEDURE IN DETAIL:  After proper consents were obtained, the patient was brought to the Operating Room where he was prepped and draped in normal sterile fashion and provided general endotracheal anesthesia in dorsal lithotomy position.  A 22-Sammarinese cystoscope was assembled and introduced per urethra and the bladder was rinsed and drained.  Fluoroscopy was used to evaluate stone position at the start of the case and throughout the case for wire and scope guidance.    An attempt was made to gently pass a guidewire up the ureter, and then the  cystoscope was set aside with the guidewire in place.      The semirigid ureteroscope was then brought to bear and easily passed into the ureteral orifice.  It was advanced to mid-ureter and could not advance further.  An amplatz wire was placed and the scope was set aside.  Over the amplatz a ureteral access sheath was placed in serial fashion.  The flexible ureteroscope was used to examine the kidney and UPJ.  No masses or stones were observed.  The UPJ had a high insertion, and seemed to have an oval/flapped shape but was sufficient to admit the scope. A wire was passed coaxially and the scope removed, along with the ureteral access sheath and the entirety of the ureter was thus examined.      A 6x24 stent was then passed over the wire and when wire was pulled the stent was in excellent position between the right renal pelvis and the bladder.  The bladder was then rinsed and drained and stone fragments collected for pathologic examination.  After the bladder was drained, cystoscope was withdrawn and set aside.      The pt tolerated all of this well, and there were no complications.  he was awakened and transferred to Recovery in stable condition.

## 2018-03-06 NOTE — ANESTHESIA POSTPROCEDURE EVALUATION
"Anesthesia Post Evaluation    Patient: Rahul Yip    Procedure(s) Performed: Procedure(s) (LRB):  URETEROSCOPY (Left)  CYSTOSCOPY WITH STENT PLACEMENT (Left)    Final Anesthesia Type: general  Patient location during evaluation: PACU  Patient participation: Yes- Able to Participate  Level of consciousness: awake and alert  Post-procedure vital signs: reviewed and stable  Pain management: adequate  Airway patency: patent  PONV status at discharge: No PONV  Anesthetic complications: no      Cardiovascular status: blood pressure returned to baseline  Respiratory status: unassisted  Hydration status: euvolemic  Follow-up not needed.        Visit Vitals  /70 (BP Location: Left arm, Patient Position: Lying)   Pulse 76   Temp 36.5 °C (97.7 °F) (Temporal)   Resp 16   Ht 5' 8" (1.727 m)   Wt 76.6 kg (168 lb 14 oz)   SpO2 97%   BMI 25.68 kg/m²       Pain/Radha Score: Pain Assessment Performed: Yes (3/6/2018 12:45 PM)  Presence of Pain: denies (3/6/2018 12:45 PM)  Radha Score: 10 (3/6/2018 12:45 PM)      "

## 2018-03-08 ENCOUNTER — TELEPHONE (OUTPATIENT)
Dept: UROLOGY | Facility: CLINIC | Age: 68
End: 2018-03-08

## 2018-03-08 NOTE — TELEPHONE ENCOUNTER
Returned call to pt; questions regarding the color of his urine and medications to remedy constipation. Explained to pt that the color of his urine will get better after drinking lots of water.  Instructed pt to use Miralax for constipation.

## 2018-03-12 ENCOUNTER — OFFICE VISIT (OUTPATIENT)
Dept: UROLOGY | Facility: CLINIC | Age: 68
End: 2018-03-12
Payer: COMMERCIAL

## 2018-03-12 VITALS
DIASTOLIC BLOOD PRESSURE: 74 MMHG | WEIGHT: 171.94 LBS | SYSTOLIC BLOOD PRESSURE: 116 MMHG | HEIGHT: 68 IN | HEART RATE: 67 BPM | BODY MASS INDEX: 26.06 KG/M2

## 2018-03-12 DIAGNOSIS — N13.5 UPJ OBSTRUCTION, ACQUIRED: Primary | ICD-10-CM

## 2018-03-12 PROCEDURE — 99999 PR PBB SHADOW E&M-EST. PATIENT-LVL II: CPT | Mod: PBBFAC,,, | Performed by: UROLOGY

## 2018-03-12 PROCEDURE — 99024 POSTOP FOLLOW-UP VISIT: CPT | Mod: S$GLB,,, | Performed by: UROLOGY

## 2018-03-12 NOTE — PROGRESS NOTES
Chief Complaint: Annual prostate cancer screening    HPI:   3/12/18: Left URS shows no obvious UPJ obstruction, no masses, kidney relatively normal.  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    2/21/18: CT Urogram shows a high inserting ureter and obvious poor left function.  No way to say when this happened.  1/24/18: CT showed a left hydronephrosis with no obstructing UPJ stone, possibly congenital, possibly changes from stones.  No sig stones now to treat.  1/18/17: 67 yo pt of Dr. Valentino here for annual exam.  Feels well no complaints.  PSA has bumped at times, no biopsies.  Had a kidney stone 10 years ago he passed on his own.  Had other stones before that.    Allergies:  Patient has no known allergies.    Medications:  has a current medication list which includes the following prescription(s): aspirin, atorvastatin, docusate sodium, gemfibrozil, lisinopril, oxycodone-acetaminophen, and tamsulosin.    Review of Systems:  General: No fever, chills, fatigability, or weight loss.  Skin: No rashes, itching, or changes in color or texture of skin.  Chest: Denies LOUISE, cyanosis, wheezing, cough, and sputum production.  Abdomen: Appetite fine. No weight loss. Denies diarrhea, abdominal pain, hematemesis, or blood in stool.  Musculoskeletal: No joint stiffness or swelling. Denies back pain.  : As above.  All other review of systems negative.    PMH:   has a past medical history of Anticoagulant long-term use; Colon polyp; Elevated PSA; HLD (hyperlipidemia); HTN (hypertension); and Kidney stone (2006).    PSH:   has a past surgical history that includes Colonoscopy (N/A, 3/28/2016).    FamHx: family history is not on file.    SocHx:  reports that he has never smoked. He does not have any smokeless tobacco history on file. He reports that he drinks alcohol. He reports that he does not use drugs.      Physical Exam:  Vitals:    03/12/18 0930   BP: 116/74   Pulse: 67     General: A&Ox3, no apparent  distress, no deformities  Neck: No masses, normal thyroid  Lungs: normal inspiration, no use of accessory muscles  Heart: normal pulse, no arrhythmias  Abdomen: Soft, NT, ND  Skin: The skin is warm and dry. No jaundice.  Ext: No c/c/e.  : Test desc frdedy, no abnormalities of epididymus. Penis normal, with normal penile and scrotal skin. Meatus normal. Normal rectal tone, no hemorrhoids. Prost 40 gm no nodules or masses appreciated. SV not palpable. Perineum and anus normal.    Labs/Studies:   Urinalysis performed in clinic, summary: UA normal  PSA    1/17: 3.2    1/18: PSA 5.3, 4.9    Impression/Plan:   1. Higher risk of prostate cancer.  Reviewed PSA and will plan to recheck 3 mo.  2. Agreed to cysto/remove stent and repeat Mag3 in 6 months.

## 2018-03-15 RX ORDER — GEMFIBROZIL 600 MG/1
TABLET, FILM COATED ORAL
Qty: 60 TABLET | Refills: 11 | Status: SHIPPED | OUTPATIENT
Start: 2018-03-15 | End: 2018-09-20 | Stop reason: SDUPTHER

## 2018-03-21 ENCOUNTER — OFFICE VISIT (OUTPATIENT)
Dept: UROLOGY | Facility: CLINIC | Age: 68
End: 2018-03-21
Payer: COMMERCIAL

## 2018-03-21 VITALS — HEIGHT: 68 IN | BODY MASS INDEX: 26.06 KG/M2 | WEIGHT: 171.94 LBS

## 2018-03-21 DIAGNOSIS — N13.5 UPJ OBSTRUCTION, ACQUIRED: Primary | ICD-10-CM

## 2018-03-21 PROCEDURE — 52310 CYSTOSCOPY AND TREATMENT: CPT | Mod: S$GLB,,, | Performed by: UROLOGY

## 2018-03-21 PROCEDURE — 81002 URINALYSIS NONAUTO W/O SCOPE: CPT | Mod: S$GLB,,, | Performed by: UROLOGY

## 2018-03-21 PROCEDURE — 99499 UNLISTED E&M SERVICE: CPT | Mod: S$GLB,,, | Performed by: UROLOGY

## 2018-03-21 PROCEDURE — 99999 PR PBB SHADOW E&M-EST. PATIENT-LVL II: CPT | Mod: PBBFAC,,, | Performed by: UROLOGY

## 2018-03-21 PROCEDURE — 96372 THER/PROPH/DIAG INJ SC/IM: CPT | Mod: 59,S$GLB,, | Performed by: UROLOGY

## 2018-03-21 RX ORDER — CEFTRIAXONE 1 G/1
1 INJECTION, POWDER, FOR SOLUTION INTRAMUSCULAR; INTRAVENOUS
Status: COMPLETED | OUTPATIENT
Start: 2018-03-21 | End: 2018-03-21

## 2018-03-21 RX ADMIN — CEFTRIAXONE 1 G: 1 INJECTION, POWDER, FOR SOLUTION INTRAMUSCULAR; INTRAVENOUS at 09:03

## 2018-03-21 NOTE — PROGRESS NOTES
Chief Complaint: Annual prostate cancer screening    HPI:   3/21/18: Cysto/stent pull today uneventful  3/12/18: Left URS shows no obvious UPJ obstruction, no masses, kidney relatively normal.  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    2/21/18: CT Urogram shows a high inserting ureter and obvious poor left function.  No way to say when this happened.  1/24/18: CT showed a left hydronephrosis with no obstructing UPJ stone, possibly congenital, possibly changes from stones.  No sig stones now to treat.  1/18/17: 65 yo pt of Dr. Valentino here for annual exam.  Feels well no complaints.  PSA has bumped at times, no biopsies.  Had a kidney stone 10 years ago he passed on his own.  Had other stones before that.    Allergies:  Patient has no known allergies.    Medications:  has a current medication list which includes the following prescription(s): aspirin, atorvastatin, docusate sodium, gemfibrozil, gemfibrozil, lisinopril, oxycodone-acetaminophen, and tamsulosin.    Review of Systems:  General: No fever, chills, fatigability, or weight loss.  Skin: No rashes, itching, or changes in color or texture of skin.  Chest: Denies LOUISE, cyanosis, wheezing, cough, and sputum production.  Abdomen: Appetite fine. No weight loss. Denies diarrhea, abdominal pain, hematemesis, or blood in stool.  Musculoskeletal: No joint stiffness or swelling. Denies back pain.  : As above.  All other review of systems negative.    PMH:   has a past medical history of Anticoagulant long-term use; Colon polyp; Elevated PSA; HLD (hyperlipidemia); HTN (hypertension); and Kidney stone (2006).    PSH:   has a past surgical history that includes Colonoscopy (N/A, 3/28/2016).    FamHx: family history is not on file.    SocHx:  reports that he has never smoked. He does not have any smokeless tobacco history on file. He reports that he drinks alcohol. He reports that he does not use drugs.      Physical Exam:  There were no vitals filed for  this visit.  General: A&Ox3, no apparent distress, no deformities  Neck: No masses, normal thyroid  Lungs: normal inspiration, no use of accessory muscles  Heart: normal pulse, no arrhythmias  Abdomen: Soft, NT, ND  Skin: The skin is warm and dry. No jaundice.  Ext: No c/c/e.  : Test desc freddy, no abnormalities of epididymus. Penis normal, with normal penile and scrotal skin. Meatus normal. Normal rectal tone, no hemorrhoids. Prost 40 gm no nodules or masses appreciated. SV not palpable. Perineum and anus normal.    Labs/Studies:   Urinalysis performed in clinic, summary: UA normal  PSA    1/17: 3.2    1/18: PSA 5.3, 4.9    Procedure:  Cystoscopy with removal of foreign body - simple (ureteral stent)    Procedure in Detail: After proper consents were obtained, the patient was prepped and draped in normal sterile fashion for diagnostic cystoscopy. 5 ml of lidocaine jelly was instilled in the urethra. The flexible cystoscope was then introduced into the urethra, and advanced into the bladder under direct vision. The previously placed stent was observed and was held with graspers.  The cystoscope was then removed, and the procedure terminated. The stent was removed in its entirety.     Findings: left ureteral stent removed without difficulty    ABx: Rocephin 1 gm IM    Impression/Plan:   1. Higher risk of prostate cancer.  Reviewed PSA and will plan to recheck 3 mo.  2. Agreed to cysto/remove stent and repeat Mag3 in 6 months.

## 2018-03-27 RX ORDER — LISINOPRIL 10 MG/1
TABLET ORAL
Qty: 30 TABLET | Refills: 0 | Status: SHIPPED | OUTPATIENT
Start: 2018-03-27 | End: 2018-05-01 | Stop reason: SDUPTHER

## 2018-05-01 RX ORDER — LISINOPRIL 10 MG/1
TABLET ORAL
Qty: 30 TABLET | Refills: 0 | Status: SHIPPED | OUTPATIENT
Start: 2018-05-01 | End: 2018-06-06 | Stop reason: SDUPTHER

## 2018-05-01 NOTE — LETTER
May 1, 2018    Rahul Yip  [address] [place] [address]             [facility]  [address] [place] [address]  Phone: [phone]  Fax: [phone] Dear Mr. Yip:    You have recently requested a refill from Dr. Tyler Glez.  He has supplied you with a 30 day of this medication.   It has been over a year since your last visit.  This is a reminder to schedule your annual with Dr. Tyler Glez.  Please call the [facility] scheduling department at [phone] before your next refill.  No further refills will be given.        If you have any questions or concerns, please don't hesitate to call.    Sincerely,        Flor Champion LPN

## 2018-05-30 RX ORDER — ATORVASTATIN CALCIUM 40 MG/1
TABLET, FILM COATED ORAL
Qty: 45 TABLET | Refills: 4 | Status: SHIPPED | OUTPATIENT
Start: 2018-05-30 | End: 2019-07-06 | Stop reason: SDUPTHER

## 2018-06-06 RX ORDER — LISINOPRIL 10 MG/1
TABLET ORAL
Qty: 30 TABLET | Refills: 0 | Status: SHIPPED | OUTPATIENT
Start: 2018-06-06 | End: 2018-06-08 | Stop reason: SDUPTHER

## 2018-06-11 RX ORDER — LISINOPRIL 10 MG/1
TABLET ORAL
Qty: 30 TABLET | Refills: 0 | Status: SHIPPED | OUTPATIENT
Start: 2018-06-11 | End: 2018-09-20 | Stop reason: SDUPTHER

## 2018-06-12 ENCOUNTER — LAB VISIT (OUTPATIENT)
Dept: LAB | Facility: HOSPITAL | Age: 68
End: 2018-06-12
Attending: UROLOGY
Payer: COMMERCIAL

## 2018-06-12 DIAGNOSIS — N13.5 UPJ OBSTRUCTION, ACQUIRED: ICD-10-CM

## 2018-06-12 LAB — COMPLEXED PSA SERPL-MCNC: 4 NG/ML

## 2018-06-12 PROCEDURE — 36415 COLL VENOUS BLD VENIPUNCTURE: CPT

## 2018-06-12 PROCEDURE — 84153 ASSAY OF PSA TOTAL: CPT

## 2018-06-14 ENCOUNTER — OFFICE VISIT (OUTPATIENT)
Dept: UROLOGY | Facility: CLINIC | Age: 68
End: 2018-06-14
Payer: COMMERCIAL

## 2018-06-14 VITALS
WEIGHT: 173.5 LBS | DIASTOLIC BLOOD PRESSURE: 74 MMHG | SYSTOLIC BLOOD PRESSURE: 128 MMHG | HEART RATE: 67 BPM | BODY MASS INDEX: 26.3 KG/M2 | HEIGHT: 68 IN

## 2018-06-14 DIAGNOSIS — N13.5 UPJ OBSTRUCTION, ACQUIRED: Primary | ICD-10-CM

## 2018-06-14 DIAGNOSIS — Z12.5 PROSTATE CANCER SCREENING: ICD-10-CM

## 2018-06-14 LAB
BILIRUB SERPL-MCNC: NORMAL MG/DL
BLOOD URINE, POC: NORMAL
COLOR, POC UA: YELLOW
GLUCOSE UR QL STRIP: NORMAL
KETONES UR QL STRIP: NORMAL
LEUKOCYTE ESTERASE URINE, POC: NORMAL
NITRITE, POC UA: NORMAL
PH, POC UA: 8
PROTEIN, POC: NORMAL
SPECIFIC GRAVITY, POC UA: 1
UROBILINOGEN, POC UA: NORMAL

## 2018-06-14 PROCEDURE — 81002 URINALYSIS NONAUTO W/O SCOPE: CPT | Mod: S$GLB,,, | Performed by: UROLOGY

## 2018-06-14 PROCEDURE — 3078F DIAST BP <80 MM HG: CPT | Mod: CPTII,S$GLB,, | Performed by: UROLOGY

## 2018-06-14 PROCEDURE — 99999 PR PBB SHADOW E&M-EST. PATIENT-LVL II: CPT | Mod: PBBFAC,,, | Performed by: UROLOGY

## 2018-06-14 PROCEDURE — 3074F SYST BP LT 130 MM HG: CPT | Mod: CPTII,S$GLB,, | Performed by: UROLOGY

## 2018-06-14 PROCEDURE — 99214 OFFICE O/P EST MOD 30 MIN: CPT | Mod: 25,S$GLB,, | Performed by: UROLOGY

## 2018-06-14 NOTE — PROGRESS NOTES
Chief Complaint: Annual prostate cancer screening    HPI:   6/14/18: No complaints at all, no flank pain after stent removal.  PSA normal.  3/21/18: Cysto/stent pull today uneventful  3/12/18: Left URS shows no obvious UPJ obstruction, no masses, kidney relatively normal.  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    2/21/18: CT Urogram shows a high inserting ureter and obvious poor left function.  No way to say when this happened.  1/24/18: CT showed a left hydronephrosis with no obstructing UPJ stone, possibly congenital, possibly changes from stones.  No sig stones now to treat.  1/18/17: 67 yo pt of Dr. Valentino here for annual exam.  Feels well no complaints.  PSA has bumped at times, no biopsies.  Had a kidney stone 10 years ago he passed on his own.  Had other stones before that.    Allergies:  Patient has no known allergies.    Medications:  has a current medication list which includes the following prescription(s): aspirin, atorvastatin, docusate sodium, gemfibrozil, gemfibrozil, lisinopril, lisinopril, oxycodone-acetaminophen, and tamsulosin.    Review of Systems:  General: No fever, chills, fatigability, or weight loss.  Skin: No rashes, itching, or changes in color or texture of skin.  Chest: Denies LOUISE, cyanosis, wheezing, cough, and sputum production.  Abdomen: Appetite fine. No weight loss. Denies diarrhea, abdominal pain, hematemesis, or blood in stool.  Musculoskeletal: No joint stiffness or swelling. Denies back pain.  : As above.  All other review of systems negative.    PMH:   has a past medical history of Anticoagulant long-term use; Colon polyp; Elevated PSA; HLD (hyperlipidemia); HTN (hypertension); and Kidney stone (2006).    PSH:   has a past surgical history that includes Colonoscopy (N/A, 3/28/2016).    FamHx: family history is not on file.    SocHx:  reports that he has never smoked. He does not have any smokeless tobacco history on file. He reports that he drinks alcohol.  He reports that he does not use drugs.      Physical Exam:  Vitals:    06/14/18 1341   BP: 128/74   Pulse: 67     General: A&Ox3, no apparent distress, no deformities  Neck: No masses, normal thyroid  Lungs: normal inspiration, no use of accessory muscles  Heart: normal pulse, no arrhythmias  Abdomen: Soft, NT, ND  Skin: The skin is warm and dry. No jaundice.  Ext: No c/c/e.  :   3/18: Test desc freddy, no abnormalities of epididymus. Penis normal, with normal penile and scrotal skin. Meatus normal. Normal rectal tone, no hemorrhoids. Prost 40 gm no nodules or masses appreciated. SV not palpable. Perineum and anus normal.    Labs/Studies:   Urinalysis performed in clinic, summary: UA normal  PSA    1/17: 3.2    1/18: PSA 5.3, 4.9    6/18: 4.0    Impression/Plan:   1. Mag3 and check ureteral drainage.  If abnormal will get CT Urogram and consider pyeloplasty.  2. Otherwise PSA 6/12 with RTC 12 mo.

## 2018-08-09 RX ORDER — LISINOPRIL 10 MG/1
TABLET ORAL
Qty: 30 TABLET | Refills: 0 | OUTPATIENT
Start: 2018-08-09

## 2018-08-09 NOTE — TELEPHONE ENCOUNTER
Called and left message that Dr. Glez said he would have to be seen  before filling any medications.  Call if any questions

## 2018-09-14 ENCOUNTER — HOSPITAL ENCOUNTER (OUTPATIENT)
Dept: RADIOLOGY | Facility: HOSPITAL | Age: 68
Discharge: HOME | End: 2018-09-14
Attending: UROLOGY
Payer: COMMERCIAL

## 2018-09-14 DIAGNOSIS — N13.5 UPJ OBSTRUCTION, ACQUIRED: ICD-10-CM

## 2018-09-14 PROCEDURE — A9562 TC99M MERTIATIDE: HCPCS

## 2018-09-17 ENCOUNTER — TELEPHONE (OUTPATIENT)
Dept: UROLOGY | Facility: CLINIC | Age: 68
End: 2018-09-17

## 2018-09-17 DIAGNOSIS — N13.30 HYDRONEPHROSIS, UNSPECIFIED HYDRONEPHROSIS TYPE: Primary | ICD-10-CM

## 2018-09-20 ENCOUNTER — OFFICE VISIT (OUTPATIENT)
Dept: INTERNAL MEDICINE | Facility: CLINIC | Age: 68
End: 2018-09-20
Payer: MEDICARE

## 2018-09-20 ENCOUNTER — LAB VISIT (OUTPATIENT)
Dept: LAB | Facility: HOSPITAL | Age: 68
End: 2018-09-20
Payer: MEDICARE

## 2018-09-20 VITALS
HEIGHT: 68 IN | BODY MASS INDEX: 26.3 KG/M2 | DIASTOLIC BLOOD PRESSURE: 78 MMHG | HEART RATE: 70 BPM | TEMPERATURE: 98 F | WEIGHT: 173.5 LBS | SYSTOLIC BLOOD PRESSURE: 122 MMHG

## 2018-09-20 DIAGNOSIS — Z00.00 ROUTINE GENERAL MEDICAL EXAMINATION AT HEALTH CARE FACILITY: ICD-10-CM

## 2018-09-20 DIAGNOSIS — Z00.00 ROUTINE GENERAL MEDICAL EXAMINATION AT HEALTH CARE FACILITY: Primary | ICD-10-CM

## 2018-09-20 DIAGNOSIS — E78.2 MIXED HYPERLIPIDEMIA: ICD-10-CM

## 2018-09-20 DIAGNOSIS — I10 ESSENTIAL HYPERTENSION: ICD-10-CM

## 2018-09-20 DIAGNOSIS — N28.9 NON-FUNCTIONING KIDNEY: ICD-10-CM

## 2018-09-20 LAB
ALBUMIN SERPL BCP-MCNC: 3.6 G/DL
ALP SERPL-CCNC: 95 U/L
ALT SERPL W/O P-5'-P-CCNC: 17 U/L
ANION GAP SERPL CALC-SCNC: 5 MMOL/L
AST SERPL-CCNC: 20 U/L
BASOPHILS # BLD AUTO: 0.06 K/UL
BASOPHILS NFR BLD: 1 %
BILIRUB SERPL-MCNC: 0.5 MG/DL
BUN SERPL-MCNC: 18 MG/DL
CALCIUM SERPL-MCNC: 9.4 MG/DL
CHLORIDE SERPL-SCNC: 109 MMOL/L
CHOLEST SERPL-MCNC: 136 MG/DL
CHOLEST/HDLC SERPL: 2.7 {RATIO}
CO2 SERPL-SCNC: 27 MMOL/L
CREAT SERPL-MCNC: 1.1 MG/DL
DIFFERENTIAL METHOD: ABNORMAL
EOSINOPHIL # BLD AUTO: 0.3 K/UL
EOSINOPHIL NFR BLD: 4.9 %
ERYTHROCYTE [DISTWIDTH] IN BLOOD BY AUTOMATED COUNT: 13.3 %
EST. GFR  (AFRICAN AMERICAN): >60 ML/MIN/1.73 M^2
EST. GFR  (NON AFRICAN AMERICAN): >60 ML/MIN/1.73 M^2
GLUCOSE SERPL-MCNC: 85 MG/DL
HCT VFR BLD AUTO: 43.1 %
HDLC SERPL-MCNC: 50 MG/DL
HDLC SERPL: 36.8 %
HGB BLD-MCNC: 13.5 G/DL
IMM GRANULOCYTES # BLD AUTO: 0.03 K/UL
IMM GRANULOCYTES NFR BLD AUTO: 0.5 %
LDLC SERPL CALC-MCNC: 73.6 MG/DL
LYMPHOCYTES # BLD AUTO: 1.4 K/UL
LYMPHOCYTES NFR BLD: 22.5 %
MCH RBC QN AUTO: 28.6 PG
MCHC RBC AUTO-ENTMCNC: 31.3 G/DL
MCV RBC AUTO: 91 FL
MONOCYTES # BLD AUTO: 0.7 K/UL
MONOCYTES NFR BLD: 10.8 %
NEUTROPHILS # BLD AUTO: 3.8 K/UL
NEUTROPHILS NFR BLD: 60.3 %
NONHDLC SERPL-MCNC: 86 MG/DL
NRBC BLD-RTO: 0 /100 WBC
PLATELET # BLD AUTO: 225 K/UL
PMV BLD AUTO: 9.9 FL
POTASSIUM SERPL-SCNC: 4.1 MMOL/L
PROT SERPL-MCNC: 7.2 G/DL
RBC # BLD AUTO: 4.72 M/UL
SODIUM SERPL-SCNC: 141 MMOL/L
TRIGL SERPL-MCNC: 62 MG/DL
WBC # BLD AUTO: 6.3 K/UL

## 2018-09-20 PROCEDURE — 99999 PR PBB SHADOW E&M-EST. PATIENT-LVL III: CPT | Mod: PBBFAC,,, | Performed by: INTERNAL MEDICINE

## 2018-09-20 PROCEDURE — 80061 LIPID PANEL: CPT

## 2018-09-20 PROCEDURE — 36415 COLL VENOUS BLD VENIPUNCTURE: CPT | Mod: PO

## 2018-09-20 PROCEDURE — 80053 COMPREHEN METABOLIC PANEL: CPT

## 2018-09-20 PROCEDURE — 3078F DIAST BP <80 MM HG: CPT | Mod: CPTII,,, | Performed by: INTERNAL MEDICINE

## 2018-09-20 PROCEDURE — G0009 ADMIN PNEUMOCOCCAL VACCINE: HCPCS | Mod: PBBFAC,PO

## 2018-09-20 PROCEDURE — 3074F SYST BP LT 130 MM HG: CPT | Mod: CPTII,,, | Performed by: INTERNAL MEDICINE

## 2018-09-20 PROCEDURE — 99397 PER PM REEVAL EST PAT 65+ YR: CPT | Mod: 25,S$PBB,, | Performed by: INTERNAL MEDICINE

## 2018-09-20 PROCEDURE — 85025 COMPLETE CBC W/AUTO DIFF WBC: CPT

## 2018-09-20 PROCEDURE — 99213 OFFICE O/P EST LOW 20 MIN: CPT | Mod: PBBFAC,PO,25 | Performed by: INTERNAL MEDICINE

## 2018-09-20 RX ORDER — GEMFIBROZIL 600 MG/1
600 TABLET, FILM COATED ORAL
Qty: 180 TABLET | Refills: 3 | Status: SHIPPED | OUTPATIENT
Start: 2018-09-20 | End: 2019-11-13 | Stop reason: SDUPTHER

## 2018-09-20 RX ORDER — LISINOPRIL 10 MG/1
10 TABLET ORAL DAILY
Qty: 90 TABLET | Refills: 3 | Status: SHIPPED | OUTPATIENT
Start: 2018-09-20 | End: 2019-10-26 | Stop reason: SDUPTHER

## 2018-09-20 NOTE — PROGRESS NOTES
Subjective:       Patient ID: Rahul FOSTER Parent is a 68 y.o. male.    Chief Complaint: No chief complaint on file.    HPI  Patient is a 68-year-old male presenting today for updated physical exam review of chronic health issues.  It has been little bit over a year since he was last in.  He has had a history of hypertension, hyperlipidemia colon polyps.  Over the last year he has been working with Dr. Dukes in regards to some issues is covered with his left kidney.  It is noted that the left kidney is nonfunctional.  It is believed secondary to chronic UPJ obstruction.  Patient has had a history of kidney stones in the past but does not recall any recent stones.  He estimates his last stone issue would have been 10 years ago when he knew he had stones.  [facility] imaging from last January showed some stones in the kidneys collecting systems but the patient relates no symptoms at that time that he is aware of.  Nonetheless his renal function has been maintained but the left kidney appears to be nonfunctioning at this time.    He continues take his blood pressure medication and cholesterol medications as prescribed.  His blood pressure is under good control.  He has not had any adverse effects of the medications.      Patient was instructed today to avoid dehydration and to avoid anti-inflammatories to try to limit any injury to his remaining kidney.    Review of Systems   Constitutional: Negative for fever and unexpected weight change.   HENT: Negative for hearing loss, postnasal drip and rhinorrhea.    Eyes: Negative for pain and visual disturbance.   Respiratory: Negative for cough, shortness of breath and wheezing.    Cardiovascular: Negative for chest pain and palpitations.   Gastrointestinal: Negative for constipation, diarrhea, nausea and vomiting.   Genitourinary: Negative for dysuria and hematuria.   Musculoskeletal: Negative for arthralgias, back pain, myalgias and neck stiffness.   Skin: Negative for pallor and  "rash.   Neurological: Negative for seizures, syncope and headaches.   Hematological: Negative for adenopathy.   Psychiatric/Behavioral: Negative for dysphoric mood. The patient is not nervous/anxious.        Objective:   /78   Pulse 70   Temp 97.6 °F (36.4 °C) (Tympanic)   Ht 5' 8" (1.727 m)   Wt 78.7 kg (173 lb 8 oz)   BMI 26.38 kg/m²      Physical Exam   Constitutional: He is oriented to person, place, and time. He appears well-developed and well-nourished. No distress.   HENT:   Head: Normocephalic and atraumatic.   Mouth/Throat: Oropharynx is clear and moist.   Eyes: EOM are normal. Pupils are equal, round, and reactive to light.   Neck: Normal range of motion. Neck supple. No JVD present. No thyromegaly present.   Cardiovascular: Normal rate, regular rhythm, normal heart sounds and intact distal pulses. Exam reveals no gallop and no friction rub.   No murmur heard.  Pulmonary/Chest: Effort normal and breath sounds normal. He has no wheezes. He has no rales.   Abdominal: Soft. Bowel sounds are normal. He exhibits no distension. There is no tenderness. There is no rebound and no guarding.   Musculoskeletal: Normal range of motion. He exhibits no edema.   Lymphadenopathy:     He has no cervical adenopathy.   Neurological: He is alert and oriented to person, place, and time. He has normal reflexes. No cranial nerve deficit.   Skin: Skin is warm and dry. No rash noted.   Psychiatric: He has a normal mood and affect. Judgment normal.   Vitals reviewed.          Assessment:       1. Routine general medical examination at health care facility    2. Essential hypertension    3. Mixed hyperlipidemia    4. Non-functioning kidney        Plan:   Essential hypertension  Blood pressure is under good control.  We will continue the current regimen.  Will work on regular aerobic exercise and a low salt diet.        Hyperlipidemia  Continue meds, update lipid panel    Diagnoses and all orders for this visit:    Routine " general medical examination at health care facility  Comments:  Focus on good health habits, low fat diet, regular exercise, seatbelt use, sunscreen use  Orders:  -     CBC auto differential; Future  -     Comprehensive metabolic panel; Future  -     Lipid panel; Future    Essential hypertension  -     lisinopril 10 MG tablet; Take 1 tablet (10 mg total) by mouth once daily.    Mixed hyperlipidemia    Non-functioning kidney    Other orders  -     (In Office Administered) Pneumococcal Conjugate Vaccine (13 Valent) (IM)  -     gemfibrozil (LOPID) 600 MG tablet; Take 1 tablet (600 mg total) by mouth 2 (two) times daily before meals.        Follow-up in about 1 year (around 9/20/2019).

## 2018-11-26 ENCOUNTER — OFFICE VISIT (OUTPATIENT)
Dept: UROLOGY | Facility: CLINIC | Age: 68
End: 2018-11-26
Payer: MEDICARE

## 2018-11-26 ENCOUNTER — LAB VISIT (OUTPATIENT)
Dept: LAB | Facility: HOSPITAL | Age: 68
End: 2018-11-26
Attending: UROLOGY
Payer: MEDICARE

## 2018-11-26 VITALS
HEART RATE: 75 BPM | HEIGHT: 68 IN | WEIGHT: 174.19 LBS | BODY MASS INDEX: 26.4 KG/M2 | SYSTOLIC BLOOD PRESSURE: 114 MMHG | DIASTOLIC BLOOD PRESSURE: 62 MMHG

## 2018-11-26 DIAGNOSIS — N13.30 HYDRONEPHROSIS, UNSPECIFIED HYDRONEPHROSIS TYPE: ICD-10-CM

## 2018-11-26 DIAGNOSIS — Z12.5 PROSTATE CANCER SCREENING: ICD-10-CM

## 2018-11-26 DIAGNOSIS — Z12.5 PROSTATE CANCER SCREENING: Primary | ICD-10-CM

## 2018-11-26 LAB
BILIRUB SERPL-MCNC: NORMAL MG/DL
BLOOD URINE, POC: NORMAL
COLOR, POC UA: YELLOW
COMPLEXED PSA SERPL-MCNC: 4.1 NG/ML
GLUCOSE UR QL STRIP: NORMAL
KETONES UR QL STRIP: NORMAL
LEUKOCYTE ESTERASE URINE, POC: NORMAL
NITRITE, POC UA: NORMAL
PH, POC UA: 5
PROTEIN, POC: NORMAL
SPECIFIC GRAVITY, POC UA: 1.01
UROBILINOGEN, POC UA: NORMAL

## 2018-11-26 PROCEDURE — 3074F SYST BP LT 130 MM HG: CPT | Mod: CPTII,S$GLB,, | Performed by: UROLOGY

## 2018-11-26 PROCEDURE — 36415 COLL VENOUS BLD VENIPUNCTURE: CPT

## 2018-11-26 PROCEDURE — 3078F DIAST BP <80 MM HG: CPT | Mod: CPTII,S$GLB,, | Performed by: UROLOGY

## 2018-11-26 PROCEDURE — 81002 URINALYSIS NONAUTO W/O SCOPE: CPT | Mod: S$GLB,,, | Performed by: UROLOGY

## 2018-11-26 PROCEDURE — 1101F PT FALLS ASSESS-DOCD LE1/YR: CPT | Mod: CPTII,S$GLB,, | Performed by: UROLOGY

## 2018-11-26 PROCEDURE — 84153 ASSAY OF PSA TOTAL: CPT

## 2018-11-26 PROCEDURE — 99214 OFFICE O/P EST MOD 30 MIN: CPT | Mod: 25,S$GLB,, | Performed by: UROLOGY

## 2018-11-26 PROCEDURE — 99999 PR PBB SHADOW E&M-EST. PATIENT-LVL III: CPT | Mod: PBBFAC,,, | Performed by: UROLOGY

## 2018-11-26 NOTE — PROGRESS NOTES
Chief Complaint: Annual prostate cancer screening    HPI:   11/26/18: Mag3 is normal.  40/60 L/R function with acceptable drainage curves.  Has been at [facility] for 10 years.  Reviewed history in detail.  6/14/18: No complaints at all, no flank pain after stent removal.  PSA normal.  3/21/18: Cysto/stent pull today uneventful  3/12/18: Left URS shows no obvious UPJ obstruction, no masses, kidney relatively normal.  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    2/21/18: CT Urogram shows a high inserting ureter and obvious poor left function.  No way to say when this happened.  1/24/18: CT showed a left hydronephrosis with no obstructing UPJ stone, possibly congenital, possibly changes from stones.  No sig stones now to treat.  1/18/17: 65 yo pt of Dr. Valentino here for annual exam.  Feels well no complaints.  PSA has bumped at times, no biopsies.  Had a kidney stone 10 years ago he passed on his own.  Had other stones before that.    Allergies:  Patient has no known allergies.    Medications:  has a current medication list which includes the following prescription(s): aspirin, atorvastatin, docusate sodium, gemfibrozil, and lisinopril.    Review of Systems:  General: No fever, chills, fatigability, or weight loss.  Skin: No rashes, itching, or changes in color or texture of skin.  Chest: Denies LOUISE, cyanosis, wheezing, cough, and sputum production.  Abdomen: Appetite fine. No weight loss. Denies diarrhea, abdominal pain, hematemesis, or blood in stool.  Musculoskeletal: No joint stiffness or swelling. Denies back pain.  : As above.  All other review of systems negative.    PMH:   has a past medical history of Anticoagulant long-term use, Colon polyp, Elevated PSA, HLD (hyperlipidemia), HTN (hypertension), and Kidney stone (2006).    PSH:   has a past surgical history that includes URETEROSCOPY (Left, 3/6/2018); CYSTOSCOPY WITH STENT PLACEMENT (Left, 3/6/2018); and COLONOSCOPY (N/A,  3/28/2016).    FamHx: family history is not on file.    SocHx:  reports that  has never smoked. he has never used smokeless tobacco. He reports that he drinks alcohol. He reports that he does not use drugs.      Physical Exam:  Vitals:    11/26/18 0736   BP: 114/62   Pulse: 75     General: A&Ox3, no apparent distress, no deformities  Neck: No masses, normal thyroid  Lungs: normal inspiration, no use of accessory muscles  Heart: normal pulse, no arrhythmias  Abdomen: Soft, NT, ND  Skin: The skin is warm and dry. No jaundice.  Ext: No c/c/e.  :   3/18: Test desc freddy, no abnormalities of epididymus. Penis normal, with normal penile and scrotal skin. Meatus normal. Normal rectal tone, no hemorrhoids. Prost 40 gm no nodules or masses appreciated. SV not palpable. Perineum and anus normal.    Labs/Studies:   Urinalysis performed in clinic, summary: UA normal  PSA    1/17: 3.2    1/18: PSA 5.3, 4.9    6/18: 4.0    Impression/Plan:   1. Nothing to worry about for renal drainage.  2. PSA today and PSA/RTC 1 year

## 2019-03-21 ENCOUNTER — HOSPITAL ENCOUNTER (OUTPATIENT)
Dept: RADIOLOGY | Facility: HOSPITAL | Age: 69
Discharge: HOME | End: 2019-03-21
Attending: UROLOGY
Payer: MEDICARE

## 2019-03-21 DIAGNOSIS — N13.30 HYDRONEPHROSIS, UNSPECIFIED HYDRONEPHROSIS TYPE: ICD-10-CM

## 2019-03-21 PROCEDURE — 76770 US EXAM ABDO BACK WALL COMP: CPT | Mod: 26,,, | Performed by: RADIOLOGY

## 2019-03-21 PROCEDURE — 74018 XR ABDOMEN AP 1 VIEW: ICD-10-PCS | Mod: 26,,, | Performed by: RADIOLOGY

## 2019-03-21 PROCEDURE — 76770 US EXAM ABDO BACK WALL COMP: CPT | Mod: TC

## 2019-03-21 PROCEDURE — 76770 US RETROPERITONEAL COMPLETE: ICD-10-PCS | Mod: 26,,, | Performed by: RADIOLOGY

## 2019-03-21 PROCEDURE — 74018 RADEX ABDOMEN 1 VIEW: CPT | Mod: TC

## 2019-03-21 PROCEDURE — 74018 RADEX ABDOMEN 1 VIEW: CPT | Mod: 26,,, | Performed by: RADIOLOGY

## 2019-07-06 RX ORDER — ATORVASTATIN CALCIUM 40 MG/1
TABLET, FILM COATED ORAL
Qty: 45 TABLET | Refills: 4 | Status: SHIPPED | OUTPATIENT
Start: 2019-07-06 | End: 2020-05-20 | Stop reason: SDUPTHER

## 2019-10-26 DIAGNOSIS — I10 ESSENTIAL HYPERTENSION: ICD-10-CM

## 2019-10-28 RX ORDER — LISINOPRIL 10 MG/1
TABLET ORAL
Qty: 90 TABLET | Refills: 0 | Status: SHIPPED | OUTPATIENT
Start: 2019-10-28 | End: 2020-02-11

## 2019-10-28 NOTE — TELEPHONE ENCOUNTER
Patient has not seen Dr. Glez in over a year.  Refill is given but the patient needs an appointment for an updated physical.

## 2019-11-13 RX ORDER — GEMFIBROZIL 600 MG/1
TABLET, FILM COATED ORAL
Qty: 180 TABLET | Refills: 0 | Status: SHIPPED | OUTPATIENT
Start: 2019-11-13 | End: 2020-05-20 | Stop reason: SDUPTHER

## 2019-11-13 NOTE — TELEPHONE ENCOUNTER
Called and left message that refill given but, Dr. Glez said you would need an appt with him for your annual exam for further refills, as you have not been seen in over a year.

## 2019-11-26 ENCOUNTER — LAB VISIT (OUTPATIENT)
Dept: LAB | Facility: HOSPITAL | Age: 69
End: 2019-11-26
Attending: UROLOGY
Payer: MEDICARE

## 2019-11-26 DIAGNOSIS — N13.30 HYDRONEPHROSIS, UNSPECIFIED HYDRONEPHROSIS TYPE: ICD-10-CM

## 2019-11-26 DIAGNOSIS — Z12.5 PROSTATE CANCER SCREENING: ICD-10-CM

## 2019-11-26 LAB — COMPLEXED PSA SERPL-MCNC: 6.4 NG/ML (ref 0–4)

## 2019-11-26 PROCEDURE — 36415 COLL VENOUS BLD VENIPUNCTURE: CPT

## 2019-11-26 PROCEDURE — 84153 ASSAY OF PSA TOTAL: CPT

## 2020-02-11 DIAGNOSIS — I10 ESSENTIAL HYPERTENSION: ICD-10-CM

## 2020-02-11 RX ORDER — LISINOPRIL 10 MG/1
TABLET ORAL
Qty: 90 TABLET | Refills: 0 | Status: SHIPPED | OUTPATIENT
Start: 2020-02-11 | End: 2020-05-20 | Stop reason: SDUPTHER

## 2020-02-28 ENCOUNTER — TELEPHONE (OUTPATIENT)
Dept: UROLOGY | Facility: CLINIC | Age: 70
End: 2020-02-28

## 2020-02-28 NOTE — TELEPHONE ENCOUNTER
----- Message from Richa Harvey sent at 2/28/2020  1:51 PM CST -----  Contact: Self- [phone]  .Type:  Patient Returning Call    Who Called:Rahul FOSTER Parent  Who Left Message for Patient:unsure   Does the patient know what this is regarding?:r/s appt   Would the patient rather a call back or a response via [facility]? Call back   Best Call Back Number:.[phone] (home)   Additional Information:       Thank you,   Richa Harvey

## 2020-02-28 NOTE — TELEPHONE ENCOUNTER
Spoke with pt, rescheduled upcoming appt to 4/8 due to Dr. Dukes being booked out. He verbalized understanding.

## 2020-05-20 ENCOUNTER — OFFICE VISIT (OUTPATIENT)
Dept: INTERNAL MEDICINE | Facility: CLINIC | Age: 70
End: 2020-05-20
Payer: MEDICARE

## 2020-05-20 ENCOUNTER — LAB VISIT (OUTPATIENT)
Dept: LAB | Facility: HOSPITAL | Age: 70
End: 2020-05-20
Attending: INTERNAL MEDICINE
Payer: MEDICARE

## 2020-05-20 VITALS
BODY MASS INDEX: 27.17 KG/M2 | SYSTOLIC BLOOD PRESSURE: 110 MMHG | TEMPERATURE: 98 F | HEIGHT: 69 IN | WEIGHT: 183.44 LBS | HEART RATE: 68 BPM | DIASTOLIC BLOOD PRESSURE: 70 MMHG

## 2020-05-20 DIAGNOSIS — Z00.00 ROUTINE GENERAL MEDICAL EXAMINATION AT HEALTH CARE FACILITY: ICD-10-CM

## 2020-05-20 DIAGNOSIS — E78.2 MIXED HYPERLIPIDEMIA: ICD-10-CM

## 2020-05-20 DIAGNOSIS — Z23 NEED FOR SHINGLES VACCINE: ICD-10-CM

## 2020-05-20 DIAGNOSIS — Z00.00 ROUTINE GENERAL MEDICAL EXAMINATION AT HEALTH CARE FACILITY: Primary | ICD-10-CM

## 2020-05-20 DIAGNOSIS — I10 ESSENTIAL HYPERTENSION: ICD-10-CM

## 2020-05-20 DIAGNOSIS — L98.9 SKIN LESION: ICD-10-CM

## 2020-05-20 LAB
ALBUMIN SERPL BCP-MCNC: 3.7 G/DL (ref 3.5–5.2)
ALP SERPL-CCNC: 90 U/L (ref 55–135)
ALT SERPL W/O P-5'-P-CCNC: 24 U/L (ref 10–44)
ANION GAP SERPL CALC-SCNC: 8 MMOL/L (ref 8–16)
AST SERPL-CCNC: 23 U/L (ref 10–40)
BASOPHILS # BLD AUTO: 0.04 K/UL (ref 0–0.2)
BASOPHILS NFR BLD: 0.6 % (ref 0–1.9)
BILIRUB SERPL-MCNC: 0.3 MG/DL (ref 0.1–1)
BUN SERPL-MCNC: 18 MG/DL (ref 8–23)
CALCIUM SERPL-MCNC: 9.1 MG/DL (ref 8.7–10.5)
CHLORIDE SERPL-SCNC: 109 MMOL/L (ref 95–110)
CHOLEST SERPL-MCNC: 151 MG/DL (ref 120–199)
CHOLEST/HDLC SERPL: 3 {RATIO} (ref 2–5)
CO2 SERPL-SCNC: 25 MMOL/L (ref 23–29)
COMPLEXED PSA SERPL-MCNC: 4.3 NG/ML (ref 0–4)
CREAT SERPL-MCNC: 1.1 MG/DL (ref 0.5–1.4)
DIFFERENTIAL METHOD: ABNORMAL
EOSINOPHIL # BLD AUTO: 0.3 K/UL (ref 0–0.5)
EOSINOPHIL NFR BLD: 4.7 % (ref 0–8)
ERYTHROCYTE [DISTWIDTH] IN BLOOD BY AUTOMATED COUNT: 13.5 % (ref 11.5–14.5)
EST. GFR  (AFRICAN AMERICAN): >60 ML/MIN/1.73 M^2
EST. GFR  (NON AFRICAN AMERICAN): >60 ML/MIN/1.73 M^2
GLUCOSE SERPL-MCNC: 80 MG/DL (ref 70–110)
HCT VFR BLD AUTO: 47.7 % (ref 40–54)
HDLC SERPL-MCNC: 50 MG/DL (ref 40–75)
HDLC SERPL: 33.1 % (ref 20–50)
HGB BLD-MCNC: 14.3 G/DL (ref 14–18)
IMM GRANULOCYTES # BLD AUTO: 0.02 K/UL (ref 0–0.04)
IMM GRANULOCYTES NFR BLD AUTO: 0.3 % (ref 0–0.5)
LDLC SERPL CALC-MCNC: 86.6 MG/DL (ref 63–159)
LYMPHOCYTES # BLD AUTO: 1.4 K/UL (ref 1–4.8)
LYMPHOCYTES NFR BLD: 20.6 % (ref 18–48)
MCH RBC QN AUTO: 28.7 PG (ref 27–31)
MCHC RBC AUTO-ENTMCNC: 30 G/DL (ref 32–36)
MCV RBC AUTO: 96 FL (ref 82–98)
MONOCYTES # BLD AUTO: 0.6 K/UL (ref 0.3–1)
MONOCYTES NFR BLD: 9.6 % (ref 4–15)
NEUTROPHILS # BLD AUTO: 4.2 K/UL (ref 1.8–7.7)
NEUTROPHILS NFR BLD: 64.2 % (ref 38–73)
NONHDLC SERPL-MCNC: 101 MG/DL
NRBC BLD-RTO: 0 /100 WBC
PLATELET # BLD AUTO: 275 K/UL (ref 150–350)
PMV BLD AUTO: 10 FL (ref 9.2–12.9)
POTASSIUM SERPL-SCNC: 4.5 MMOL/L (ref 3.5–5.1)
PROT SERPL-MCNC: 7.3 G/DL (ref 6–8.4)
RBC # BLD AUTO: 4.98 M/UL (ref 4.6–6.2)
SODIUM SERPL-SCNC: 142 MMOL/L (ref 136–145)
TRIGL SERPL-MCNC: 72 MG/DL (ref 30–150)
WBC # BLD AUTO: 6.59 K/UL (ref 3.9–12.7)

## 2020-05-20 PROCEDURE — 99397 PER PM REEVAL EST PAT 65+ YR: CPT | Mod: S$GLB,,, | Performed by: INTERNAL MEDICINE

## 2020-05-20 PROCEDURE — 99999 PR PBB SHADOW E&M-EST. PATIENT-LVL III: CPT | Mod: PBBFAC,,, | Performed by: INTERNAL MEDICINE

## 2020-05-20 PROCEDURE — 80053 COMPREHEN METABOLIC PANEL: CPT

## 2020-05-20 PROCEDURE — 80061 LIPID PANEL: CPT

## 2020-05-20 PROCEDURE — 3078F DIAST BP <80 MM HG: CPT | Mod: CPTII,S$GLB,, | Performed by: INTERNAL MEDICINE

## 2020-05-20 PROCEDURE — 3074F SYST BP LT 130 MM HG: CPT | Mod: CPTII,S$GLB,, | Performed by: INTERNAL MEDICINE

## 2020-05-20 PROCEDURE — 85025 COMPLETE CBC W/AUTO DIFF WBC: CPT

## 2020-05-20 PROCEDURE — 36415 COLL VENOUS BLD VENIPUNCTURE: CPT | Mod: PO

## 2020-05-20 PROCEDURE — 99397 PR PREVENTIVE VISIT,EST,65 & OVER: ICD-10-PCS | Mod: S$GLB,,, | Performed by: INTERNAL MEDICINE

## 2020-05-20 PROCEDURE — 3078F PR MOST RECENT DIASTOLIC BLOOD PRESSURE < 80 MM HG: ICD-10-PCS | Mod: CPTII,S$GLB,, | Performed by: INTERNAL MEDICINE

## 2020-05-20 PROCEDURE — 99999 PR PBB SHADOW E&M-EST. PATIENT-LVL III: ICD-10-PCS | Mod: PBBFAC,,, | Performed by: INTERNAL MEDICINE

## 2020-05-20 PROCEDURE — 3074F PR MOST RECENT SYSTOLIC BLOOD PRESSURE < 130 MM HG: ICD-10-PCS | Mod: CPTII,S$GLB,, | Performed by: INTERNAL MEDICINE

## 2020-05-20 PROCEDURE — 84153 ASSAY OF PSA TOTAL: CPT

## 2020-05-20 RX ORDER — MUPIROCIN 20 MG/G
OINTMENT TOPICAL 2 TIMES DAILY
Qty: 2 G | Refills: 0 | Status: SHIPPED | OUTPATIENT
Start: 2020-05-20 | End: 2021-10-20

## 2020-05-20 RX ORDER — LISINOPRIL 10 MG/1
10 TABLET ORAL DAILY
Qty: 90 TABLET | Refills: 3 | Status: SHIPPED | OUTPATIENT
Start: 2020-05-20 | End: 2021-03-17

## 2020-05-20 RX ORDER — ATORVASTATIN CALCIUM 40 MG/1
20 TABLET, FILM COATED ORAL DAILY
Qty: 45 TABLET | Refills: 3 | Status: SHIPPED | OUTPATIENT
Start: 2020-05-20 | End: 2021-06-04

## 2020-05-20 RX ORDER — GEMFIBROZIL 600 MG/1
TABLET, FILM COATED ORAL
Qty: 180 TABLET | Refills: 3 | Status: SHIPPED | OUTPATIENT
Start: 2020-05-20 | End: 2021-07-09

## 2020-05-20 NOTE — PROGRESS NOTES
"Subjective:       Patient ID: Rahul FOSTER Parent is a 69 y.o. male.    Chief Complaint: Annual Exam    HPI Patient is a 69-year-old male presenting today for updated physical exam review of chronic health issues.  Patient has history of hypertension, hyperlipidemia.  He has been doing well overall he notes no significant issues.  Blood pressures been well controlled he is not aware of any issues with his cholesterol.  Generally he is doing quite well.    He is concerned about a couple of skin lesions.  One on his scalp and 1 on his upper back.  The 1 on his scalp he leaves started when he struck his head on a cabinet but it just has not healed over the course of months.  The upper back lesion he is not sure what that it might have been but it its to quite a bit he may have scratched it but it seems at this point that it is kind of an open sore and has not gotten better either.    Review of Systems   Constitutional: Negative for fever and unexpected weight change.   HENT: Negative for hearing loss, postnasal drip and rhinorrhea.    Eyes: Negative for pain and visual disturbance.   Respiratory: Negative for cough, shortness of breath and wheezing.    Cardiovascular: Negative for chest pain and palpitations.   Gastrointestinal: Negative for constipation, diarrhea, nausea and vomiting.   Genitourinary: Negative for dysuria and hematuria.   Musculoskeletal: Negative for arthralgias, back pain, myalgias and neck stiffness.   Skin: Negative for pallor and rash.   Neurological: Negative for seizures, syncope and headaches.   Hematological: Negative for adenopathy.   Psychiatric/Behavioral: Negative for dysphoric mood. The patient is not nervous/anxious.        Objective:   /70   Pulse 68   Temp 98.2 °F (36.8 °C)   Ht 5' 8.5" (1.74 m)   Wt 83.2 kg (183 lb 6.8 oz)   BMI 27.48 kg/m²      Physical Exam   Constitutional: He is oriented to person, place, and time. He appears well-developed and well-nourished. No distress. "   HENT:   Head: Normocephalic and atraumatic.   Mouth/Throat: Oropharynx is clear and moist.   Eyes: Pupils are equal, round, and reactive to light. EOM are normal.   Neck: Normal range of motion. Neck supple. No JVD present. No thyromegaly present.   Cardiovascular: Normal rate, regular rhythm, normal heart sounds and intact distal pulses. Exam reveals no gallop and no friction rub.   No murmur heard.  Pulmonary/Chest: Effort normal and breath sounds normal. He has no wheezes. He has no rales.   Abdominal: Soft. Bowel sounds are normal. He exhibits no distension. There is no tenderness. There is no rebound and no guarding.   Musculoskeletal: Normal range of motion. He exhibits no edema.   Lymphadenopathy:     He has no cervical adenopathy.   Neurological: He is alert and oriented to person, place, and time. He has normal reflexes. No cranial nerve deficit.   Skin: Skin is warm and dry. No rash noted.   Lesion on the scalp has an adherent scab.  Removal of the scab reveals a small defect in the skin about 1-2 mm in size.  It is flat no erythema.  There is little bit of spontaneous bleeding after removal of the scab.    Lesion on the upper back reveals a 1-2 cm diameter ulceration of the skin.  It is superficial.  There is no surrounding erythema.  No drainage.   Psychiatric: He has a normal mood and affect. Judgment normal.   Vitals reviewed.      No visits with results within 2 Week(s) from this visit.   Latest known visit with results is:   Lab Visit on 11/26/2019   Component Date Value    PSA, SCREEN 11/26/2019 6.4*       Assessment:       1. Routine general medical examination at health care facility    2. Essential hypertension    3. Mixed hyperlipidemia    4. Need for shingles vaccine    5. Skin lesion        Plan:   Essential hypertension  Blood pressure is under good control.  We will continue the current regimen.  Will work on regular aerobic exercise and a low salt diet.        Routine general medical  examination at health care facility  Comments:  Focus on good health habits, low fat diet, regular exercise, seatbelt use, sunscreen use  Orders:  -     CBC auto differential; Future; Expected date: 05/20/2020  -     Comprehensive metabolic panel; Future; Expected date: 05/20/2020  -     Lipid Panel; Future; Expected date: 05/20/2020  -     PSA, Screening; Future; Expected date: 05/20/2020    Essential hypertension  -     lisinopriL 10 MG tablet; Take 1 tablet (10 mg total) by mouth once daily.  Dispense: 90 tablet; Refill: 3    Mixed hyperlipidemia  -     gemfibroziL (LOPID) 600 MG tablet; TAKE 1 BY MOUTH TWO TIMES A DAY BEFORE MEALS.  Dispense: 180 tablet; Refill: 3  -     atorvastatin (LIPITOR) 40 MG tablet; Take 0.5 tablets (20 mg total) by mouth once daily.  Dispense: 45 tablet; Refill: 3    Need for shingles vaccine  Comments:  Recommend getting shingrix. Discussed administration at a pharmacy for coverage    Skin lesion  Comments:  scalp lesion and upper back lesion.  Bacitracin ointment for upper back lesion.  Refer to derm ? skin cancer on scalp  Orders:  -     Ambulatory referral/consult to Dermatology; Future; Expected date: 05/27/2020  -     mupirocin (BACTROBAN) 2 % ointment; Apply topically 2 (two) times daily.  Dispense: 2 g; Refill: 0          Follow up in about 6 months (around 11/20/2020).

## 2020-05-25 ENCOUNTER — OFFICE VISIT (OUTPATIENT)
Dept: UROLOGY | Facility: CLINIC | Age: 70
End: 2020-05-25
Payer: MEDICARE

## 2020-05-25 ENCOUNTER — PATIENT OUTREACH (OUTPATIENT)
Dept: ADMINISTRATIVE | Facility: OTHER | Age: 70
End: 2020-05-25

## 2020-05-25 VITALS
WEIGHT: 184.88 LBS | DIASTOLIC BLOOD PRESSURE: 90 MMHG | TEMPERATURE: 98 F | BODY MASS INDEX: 27.7 KG/M2 | SYSTOLIC BLOOD PRESSURE: 140 MMHG

## 2020-05-25 DIAGNOSIS — N13.30 HYDRONEPHROSIS, UNSPECIFIED HYDRONEPHROSIS TYPE: ICD-10-CM

## 2020-05-25 DIAGNOSIS — N20.0 RENAL STONE: ICD-10-CM

## 2020-05-25 DIAGNOSIS — I10 HYPERTENSION, UNSPECIFIED TYPE: ICD-10-CM

## 2020-05-25 DIAGNOSIS — Z12.5 PROSTATE CANCER SCREENING: Primary | ICD-10-CM

## 2020-05-25 PROCEDURE — 3080F PR MOST RECENT DIASTOLIC BLOOD PRESSURE >= 90 MM HG: ICD-10-PCS | Mod: CPTII,S$GLB,, | Performed by: UROLOGY

## 2020-05-25 PROCEDURE — 81002 URINALYSIS NONAUTO W/O SCOPE: CPT | Mod: S$GLB,,, | Performed by: UROLOGY

## 2020-05-25 PROCEDURE — 81002 POCT URINE DIPSTICK WITHOUT MICROSCOPE: ICD-10-PCS | Mod: S$GLB,,, | Performed by: UROLOGY

## 2020-05-25 PROCEDURE — 1159F PR MEDICATION LIST DOCUMENTED IN MEDICAL RECORD: ICD-10-PCS | Mod: S$GLB,,, | Performed by: UROLOGY

## 2020-05-25 PROCEDURE — 1159F MED LIST DOCD IN RCRD: CPT | Mod: S$GLB,,, | Performed by: UROLOGY

## 2020-05-25 PROCEDURE — 1101F PR PT FALLS ASSESS DOC 0-1 FALLS W/OUT INJ PAST YR: ICD-10-PCS | Mod: CPTII,S$GLB,, | Performed by: UROLOGY

## 2020-05-25 PROCEDURE — 99214 OFFICE O/P EST MOD 30 MIN: CPT | Mod: 25,S$GLB,, | Performed by: UROLOGY

## 2020-05-25 PROCEDURE — 3080F DIAST BP >= 90 MM HG: CPT | Mod: CPTII,S$GLB,, | Performed by: UROLOGY

## 2020-05-25 PROCEDURE — 1126F PR PAIN SEVERITY QUANTIFIED, NO PAIN PRESENT: ICD-10-PCS | Mod: S$GLB,,, | Performed by: UROLOGY

## 2020-05-25 PROCEDURE — 99999 PR PBB SHADOW E&M-EST. PATIENT-LVL III: CPT | Mod: PBBFAC,,, | Performed by: UROLOGY

## 2020-05-25 PROCEDURE — 3077F PR MOST RECENT SYSTOLIC BLOOD PRESSURE >= 140 MM HG: ICD-10-PCS | Mod: CPTII,S$GLB,, | Performed by: UROLOGY

## 2020-05-25 PROCEDURE — 3077F SYST BP >= 140 MM HG: CPT | Mod: CPTII,S$GLB,, | Performed by: UROLOGY

## 2020-05-25 PROCEDURE — 1101F PT FALLS ASSESS-DOCD LE1/YR: CPT | Mod: CPTII,S$GLB,, | Performed by: UROLOGY

## 2020-05-25 PROCEDURE — 99214 PR OFFICE/OUTPT VISIT, EST, LEVL IV, 30-39 MIN: ICD-10-PCS | Mod: 25,S$GLB,, | Performed by: UROLOGY

## 2020-05-25 PROCEDURE — 1126F AMNT PAIN NOTED NONE PRSNT: CPT | Mod: S$GLB,,, | Performed by: UROLOGY

## 2020-05-25 PROCEDURE — 99999 PR PBB SHADOW E&M-EST. PATIENT-LVL III: ICD-10-PCS | Mod: PBBFAC,,, | Performed by: UROLOGY

## 2020-05-25 NOTE — PROGRESS NOTES
Chief Complaint: Annual prostate cancer screening    HPI:   5/25/20: PSA bouncing a bit but still at baseline.  Reviewed history in detail. US last year shows a 1.1 cm left lower pole stone.  11/26/18: Mag3 is normal.  40/60 L/R function with acceptable drainage curves.  [facility]  Reviewed history in detail.  6/14/18: No complaints at all, no flank pain after stent removal.  PSA normal.  3/21/18: Cysto/stent pull today uneventful  3/12/18: Left URS shows no obvious UPJ obstruction, no masses, kidney relatively normal.  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    2/21/18: CT Urogram shows a high inserting ureter and obvious poor left function.  No way to say when this happened.  1/24/18: CT showed a left hydronephrosis with no obstructing UPJ stone, possibly congenital, possibly changes from stones.  No sig stones now to treat.  1/18/17: 65 yo pt of Dr. Valentino here for annual exam.  Feels well no complaints.  PSA has bumped at times, no biopsies.  Had a kidney stone 10 years ago he passed on his own.  Had other stones before that.    Allergies:  Patient has no known allergies.    Medications:  has a current medication list which includes the following prescription(s): aspirin, atorvastatin, gemfibrozil, lisinopril, docusate sodium, and mupirocin.    Review of Systems:  General: No fever, chills, fatigability, or weight loss.  Skin: No rashes, itching, or changes in color or texture of skin.  Chest: Denies LOUISE, cyanosis, wheezing, cough, and sputum production.  Abdomen: Appetite fine. No weight loss. Denies diarrhea, abdominal pain, hematemesis, or blood in stool.  Musculoskeletal: No joint stiffness or swelling. Denies back pain.  : As above.  All other review of systems negative.    PMH:   has a past medical history of Anticoagulant long-term use, Colon polyp, Elevated PSA, HLD (hyperlipidemia), HTN (hypertension), and Kidney stone (2006).    PSH:   has a  past surgical history that includes Colonoscopy (N/A, 3/28/2016).    FamHx: family history is not on file.    SocHx:  reports that he has never smoked. He has never used smokeless tobacco. He reports that he drinks alcohol. He reports that he does not use drugs.      Physical Exam:  Vitals:    05/25/20 1612   BP: (!) 140/90   Temp: 98.2 °F (36.8 °C)     General: A&Ox3, no apparent distress, no deformities  Neck: No masses, normal thyroid  Lungs: normal inspiration, no use of accessory muscles  Heart: normal pulse, no arrhythmias  Abdomen: Soft, NT, ND  Skin: The skin is warm and dry. No jaundice.  Ext: No c/c/e.  :   3/18: Test desc freddy, no abnormalities of epididymus. Penis normal, with normal penile and scrotal skin. Meatus normal. Normal rectal tone, no hemorrhoids. Prost 40 gm no nodules or masses appreciated. SV not palpable. Perineum and anus normal.    Labs/Studies:   Urinalysis performed in clinic, summary: UA normal  PSA    1/17: 3.2    1/18: PSA 5.3, 4.9    6/18: 4.0    11/19: 6.4    5/20: 4.3    Impression/Plan:   1. Low risk of prostate cancer: PSA/RTC 1 year  2. HTN: discussed, taking his meds  3. KUB/US to check on his renal stone; may merit ESWL.  KUB/US 1 year to keep track.  Mild left hydronephrosis chronic.

## 2020-05-27 ENCOUNTER — OFFICE VISIT (OUTPATIENT)
Dept: DERMATOLOGY | Facility: CLINIC | Age: 70
End: 2020-05-27
Payer: MEDICARE

## 2020-05-27 DIAGNOSIS — D49.2 SKIN NEOPLASM: Primary | ICD-10-CM

## 2020-05-27 DIAGNOSIS — L57.0 ACTINIC KERATOSIS: ICD-10-CM

## 2020-05-27 DIAGNOSIS — L98.9 SKIN LESION: ICD-10-CM

## 2020-05-27 DIAGNOSIS — L82.1 SEBORRHEIC KERATOSES: ICD-10-CM

## 2020-05-27 PROCEDURE — 1159F MED LIST DOCD IN RCRD: CPT | Mod: S$GLB,,, | Performed by: DERMATOLOGY

## 2020-05-27 PROCEDURE — 88305 TISSUE EXAM BY PATHOLOGIST: CPT | Performed by: PATHOLOGY

## 2020-05-27 PROCEDURE — 1101F PT FALLS ASSESS-DOCD LE1/YR: CPT | Mod: CPTII,S$GLB,, | Performed by: DERMATOLOGY

## 2020-05-27 PROCEDURE — 11103 PR TANGENTIAL BIOPSY, SKIN, EA ADDTL LESION: ICD-10-PCS | Mod: S$GLB,,, | Performed by: DERMATOLOGY

## 2020-05-27 PROCEDURE — 88305 TISSUE EXAM BY PATHOLOGIST: CPT | Mod: 26,,, | Performed by: PATHOLOGY

## 2020-05-27 PROCEDURE — 99999 PR PBB SHADOW E&M-EST. PATIENT-LVL III: ICD-10-PCS | Mod: PBBFAC,,, | Performed by: DERMATOLOGY

## 2020-05-27 PROCEDURE — 17000 DESTRUCT PREMALG LESION: CPT | Mod: 59,S$GLB,, | Performed by: DERMATOLOGY

## 2020-05-27 PROCEDURE — 11102 TANGNTL BX SKIN SINGLE LES: CPT | Mod: S$GLB,,, | Performed by: DERMATOLOGY

## 2020-05-27 PROCEDURE — 88305 TISSUE EXAM BY PATHOLOGIST: ICD-10-PCS | Mod: 26,,, | Performed by: PATHOLOGY

## 2020-05-27 PROCEDURE — 1101F PR PT FALLS ASSESS DOC 0-1 FALLS W/OUT INJ PAST YR: ICD-10-PCS | Mod: CPTII,S$GLB,, | Performed by: DERMATOLOGY

## 2020-05-27 PROCEDURE — 17000 PR DESTRUCTION(LASER SURGERY,CRYOSURGERY,CHEMOSURGERY),PREMALIGNANT LESIONS,FIRST LESION: ICD-10-PCS | Mod: 59,S$GLB,, | Performed by: DERMATOLOGY

## 2020-05-27 PROCEDURE — 11103 TANGNTL BX SKIN EA SEP/ADDL: CPT | Mod: S$GLB,,, | Performed by: DERMATOLOGY

## 2020-05-27 PROCEDURE — 1126F PR PAIN SEVERITY QUANTIFIED, NO PAIN PRESENT: ICD-10-PCS | Mod: S$GLB,,, | Performed by: DERMATOLOGY

## 2020-05-27 PROCEDURE — 1126F AMNT PAIN NOTED NONE PRSNT: CPT | Mod: S$GLB,,, | Performed by: DERMATOLOGY

## 2020-05-27 PROCEDURE — 99202 OFFICE O/P NEW SF 15 MIN: CPT | Mod: 25,S$GLB,, | Performed by: DERMATOLOGY

## 2020-05-27 PROCEDURE — 99999 PR PBB SHADOW E&M-EST. PATIENT-LVL III: CPT | Mod: PBBFAC,,, | Performed by: DERMATOLOGY

## 2020-05-27 PROCEDURE — 99202 PR OFFICE/OUTPT VISIT, NEW, LEVL II, 15-29 MIN: ICD-10-PCS | Mod: 25,S$GLB,, | Performed by: DERMATOLOGY

## 2020-05-27 PROCEDURE — 17003 DESTRUCTION, PREMALIGNANT LESIONS; SECOND THROUGH 14 LESIONS: ICD-10-PCS | Mod: S$GLB,,, | Performed by: DERMATOLOGY

## 2020-05-27 PROCEDURE — 1159F PR MEDICATION LIST DOCUMENTED IN MEDICAL RECORD: ICD-10-PCS | Mod: S$GLB,,, | Performed by: DERMATOLOGY

## 2020-05-27 PROCEDURE — 17003 DESTRUCT PREMALG LES 2-14: CPT | Mod: S$GLB,,, | Performed by: DERMATOLOGY

## 2020-05-27 PROCEDURE — 11102 PR TANGENTIAL BIOPSY, SKIN, SINGLE LESION: ICD-10-PCS | Mod: S$GLB,,, | Performed by: DERMATOLOGY

## 2020-05-27 NOTE — PATIENT INSTRUCTIONS
Shave Biopsy Wound Care    Your doctor has performed a shave biopsy today.  A band aid and vaseline ointment has been placed over the site.  This should remain in place for 24 hours.  It is recommended that you keep the area dry for the first 24 hours.  After 24 hours, you may remove the band aid and wash the area with warm soap and water and apply Vaseline jelly.  Many patients prefer to use Neosporin or Bacitracin ointment.  This is acceptable; however, know that you can develop an allergy to this medication even if you have used it safely for years.  It is important to keep the area moist.  Letting it dry out and get air slows healing time, and will worsen the scar.  Band aid is optional after first 24 hours.      If you notice increasing redness, tenderness, pain, or yellow drainage at the biopsy site, please notify your doctor.  These are signs of an infection.    If your biopsy site is bleeding, apply firm pressure for 15 minutes straight.  Repeat for another 15 minutes, if it is still bleeding.   If the surgical site continues to bleed, then please contact your doctor.      [facility] - [facility]   DERMATOLOGY  [address]   [place] [address]   Dept: [phone]   Dept Fax: [phone]

## 2020-05-27 NOTE — PROGRESS NOTES
Subjective:       Patient ID:  Rahul FOSTER Parent is a 69 y.o. male who presents for   Chief Complaint   Patient presents with    Spot     on top of head and neck refered by pcp      MrElfego Parent is a 69M with no history of skin cancer or abnormal moles who presents for two spots of concern.   He c/o growth on his back, present for months, constant. It has itched and he needs to bandage it because it drains.  He also c/o growth on his scalp, present for months, constant and relatively stable. It scabs over but he has not appreciated bleeding.       Review of Systems   Constitutional: Negative for malaise.   Skin: Positive for itching.        Objective:    Physical Exam   Constitutional: He appears well-developed and well-nourished. No distress.   Neurological: He is alert and oriented to person, place, and time.   Psychiatric: He has a normal mood and affect.   Skin:   Areas Examined (abnormalities noted in diagram):   Scalp / Hair Palpated and Inspected  Head / Face Inspection Performed  Neck Inspection Performed  Chest / Axilla Inspection Performed  Abdomen Inspection Performed  Back Inspection Performed  RUE Inspected  LUE Inspection Performed                   Diagram Legend     Erythematous scaling macule/papule c/w actinic keratosis       Vascular papule c/w angioma      Pigmented verrucoid papule/plaque c/w seborrheic keratosis      Yellow umbilicated papule c/w sebaceous hyperplasia      Irregularly shaped tan macule c/w lentigo     1-2 mm smooth white papules consistent with Milia      Movable subcutaneous cyst with punctum c/w epidermal inclusion cyst      Subcutaneous movable cyst c/w pilar cyst      Firm pink to brown papule c/w dermatofibroma      Pedunculated fleshy papule(s) c/w skin tag(s)      Evenly pigmented macule c/w junctional nevus     Mildly variegated pigmented, slightly irregular-bordered macule c/w mildly atypical nevus      Flesh colored to evenly pigmented papule c/w intradermal nevus        Pink pearly papule/plaque c/w basal cell carcinoma      Erythematous hyperkeratotic cursted plaque c/w SCC      Surgical scar with no sign of skin cancer recurrence      Open and closed comedones      Inflammatory papules and pustules      Verrucoid papule consistent consistent with wart     Erythematous eczematous patches and plaques     Dystrophic onycholytic nail with subungual debris c/w onychomycosis     Umbilicated papule    Erythematous-base heme-crusted tan verrucoid plaque consistent with inflamed seborrheic keratosis     Erythematous Silvery Scaling Plaque c/w Psoriasis     See annotation      Assessment / Plan:      Pathology Orders:     Normal Orders This Visit    Specimen to Pathology, Dermatology     Questions:    Procedure Type:  Dermatology and skin neoplasms    Number of Specimens:  2    ------------------------:  -------------------------    Spec 1 Procedure:  Biopsy Comment - shave    Spec 1 Clinical Impression:  R/O SCC    Spec 1 Source:  scalp vertex    ------------------------:  -------------------------    Spec 2 Procedure:  Biopsy Comment - shave    Spec 2 Clinical Impression:  R/O BCC    Spec 2 Source:  left upper back    Clinical Information:  ulcerated plaque        Skin neoplasm, 1) scalp vertex R/O SCC, 2) left upper back R/O BCC  -     SHAVE BIOPSY x 2 lesion, f/u path  -     Specimen to Pathology, Dermatology    Shave biopsy procedure note:    Shave biopsy of 2 lesions performed after verbal consent including risk of infection, scar, recurrence, need for additional treatment of site. Area prepped with alcohol, anesthetized with approximately 2.0cc of 1% lidocaine with epinephrine. Lesional tissue shaved with razor blade. Hemostasis achieved with application of aluminum chloride. No complications. Dressing applied. Wound care explained.        Actinic keratosis  Cryosurgery Procedure Note    Verbal consent from the patient is obtained including, but not limited to, risk of  hypopigmentation/hyperpigmentation, scar, recurrence of lesion. The patient is aware of the precancerous quality and need for treatment of these lesions. Liquid nitrogen cryosurgery is applied to the 7 actinic keratoses, as detailed in the physical exam, to produce a freeze injury. The patient is aware that blisters may form and is instructed on wound care with gentle cleansing and use of vaseline ointment to keep moist until healed. The patient is supplied a handout on cryosurgery and is instructed to call if lesions do not completely resolve.    Seborrheic keratoses  reassurance           Follow up for based on biopsy results.

## 2020-05-27 NOTE — LETTER
May 27, 2020      Tyler Glez MD  [address]  [place] [address]           [facility]  [address] [address]  Phone: [phone]  Fax: [phone]          Patient: Rahul Yip   MR Number: [id number]   YOB: 1950   Date of Visit: 5/27/2020       Dear Dr. Tyler Glez:    Thank you for referring Rahul Yip to me for evaluation. Attached you will find relevant portions of my assessment and plan of care.    If you have questions, please do not hesitate to call me. I look forward to following Rahul Yip along with you.    Sincerely,    Az Silva MD    Enclosure  CC:  No Recipients    If you would like to receive this communication electronically, please contact [email or web address] or [phone] to request more information on [employer] access.    For providers and/or their staff who would like to refer a patient to [facility], please contact us through our one-stop-shop provider referral line, [facility], at [phone].    If you feel you have received this communication in error or would no longer like to receive these types of communications, please e-mail [email or web address]

## 2020-05-29 ENCOUNTER — TELEPHONE (OUTPATIENT)
Dept: RADIOLOGY | Facility: HOSPITAL | Age: 70
End: 2020-05-29

## 2020-05-29 LAB
FINAL PATHOLOGIC DIAGNOSIS: NORMAL
GROSS: NORMAL
MICROSCOPIC EXAM: NORMAL

## 2020-06-01 ENCOUNTER — HOSPITAL ENCOUNTER (OUTPATIENT)
Dept: RADIOLOGY | Facility: HOSPITAL | Age: 70
Discharge: HOME | End: 2020-06-01
Attending: UROLOGY
Payer: MEDICARE

## 2020-06-01 ENCOUNTER — TELEPHONE (OUTPATIENT)
Dept: UROLOGY | Facility: CLINIC | Age: 70
End: 2020-06-01

## 2020-06-01 DIAGNOSIS — I10 HYPERTENSION, UNSPECIFIED TYPE: ICD-10-CM

## 2020-06-01 DIAGNOSIS — Z12.5 PROSTATE CANCER SCREENING: ICD-10-CM

## 2020-06-01 DIAGNOSIS — N20.0 RENAL STONE: ICD-10-CM

## 2020-06-01 DIAGNOSIS — N13.30 HYDRONEPHROSIS, UNSPECIFIED HYDRONEPHROSIS TYPE: ICD-10-CM

## 2020-06-01 PROCEDURE — 76770 US EXAM ABDO BACK WALL COMP: CPT | Mod: TC

## 2020-06-01 PROCEDURE — 76770 US EXAM ABDO BACK WALL COMP: CPT | Mod: 26,,, | Performed by: RADIOLOGY

## 2020-06-01 PROCEDURE — 74018 RADEX ABDOMEN 1 VIEW: CPT | Mod: 26,,, | Performed by: RADIOLOGY

## 2020-06-01 PROCEDURE — 76770 US RETROPERITONEAL COMPLETE: ICD-10-PCS | Mod: 26,,, | Performed by: RADIOLOGY

## 2020-06-01 PROCEDURE — 74018 RADEX ABDOMEN 1 VIEW: CPT | Mod: TC

## 2020-06-01 PROCEDURE — 74018 XR ABDOMEN AP 1 VIEW: ICD-10-PCS | Mod: 26,,, | Performed by: RADIOLOGY

## 2020-06-01 NOTE — TELEPHONE ENCOUNTER
Returned pt's call. Explained that our office did not call him today. He verbalized understanding.

## 2020-06-01 NOTE — TELEPHONE ENCOUNTER
----- Message from Erika Baum sent at 6/1/2020  4:03 PM CDT -----  Contact: pt   Type:  Patient Returning Call    Who Called: pt   Who Left Message for Patient:Dr Dukes  Does the patient know what this is regarding?:results   Would the patient rather a call back or a response via [facility]? Call back   Best Call Back Number: [phone]  Additional Information:

## 2020-06-05 ENCOUNTER — TELEPHONE (OUTPATIENT)
Dept: DERMATOLOGY | Facility: CLINIC | Age: 70
End: 2020-06-05

## 2020-06-05 NOTE — TELEPHONE ENCOUNTER
Returned call to pt and scheduled for procedure with Dr Silva on Wed 6/17/2020 @ 1 pm.  Pt verbalized understanding to all information given and had no further questions.

## 2020-06-05 NOTE — TELEPHONE ENCOUNTER
----- Message from Licha Bautista sent at 6/5/2020 12:25 PM CDT -----  Contact: self [phone]  Pt would like return call to schedule procedure. Please call back at [phone]. Anton Beltre

## 2020-06-17 ENCOUNTER — PROCEDURE VISIT (OUTPATIENT)
Dept: DERMATOLOGY | Facility: CLINIC | Age: 70
End: 2020-06-17
Payer: MEDICARE

## 2020-06-17 DIAGNOSIS — C44.519 BASAL CELL CARCINOMA OF TRUNK: Primary | ICD-10-CM

## 2020-06-17 PROCEDURE — 88305 TISSUE EXAM BY PATHOLOGIST: CPT | Performed by: PATHOLOGY

## 2020-06-17 PROCEDURE — 99499 UNLISTED E&M SERVICE: CPT | Mod: S$GLB,,, | Performed by: DERMATOLOGY

## 2020-06-17 PROCEDURE — 11603 PR EXC SKIN MALIG 2.1-3 CM TRUNK,ARM,LEG: ICD-10-PCS | Mod: 51,S$GLB,, | Performed by: DERMATOLOGY

## 2020-06-17 PROCEDURE — 88305 TISSUE EXAM BY PATHOLOGIST: CPT | Mod: 26,,, | Performed by: PATHOLOGY

## 2020-06-17 PROCEDURE — 12032 PR LAYR CLOS WND TRUNK,ARM,LEG 2.6-7.5 CM: ICD-10-PCS | Mod: S$GLB,,, | Performed by: DERMATOLOGY

## 2020-06-17 PROCEDURE — 11603 EXC TR-EXT MAL+MARG 2.1-3 CM: CPT | Mod: 51,S$GLB,, | Performed by: DERMATOLOGY

## 2020-06-17 PROCEDURE — 99499 NO LOS: ICD-10-PCS | Mod: S$GLB,,, | Performed by: DERMATOLOGY

## 2020-06-17 PROCEDURE — 88305 TISSUE EXAM BY PATHOLOGIST: ICD-10-PCS | Mod: 26,,, | Performed by: PATHOLOGY

## 2020-06-17 PROCEDURE — 12032 INTMD RPR S/A/T/EXT 2.6-7.5: CPT | Mod: S$GLB,,, | Performed by: DERMATOLOGY

## 2020-06-17 NOTE — PATIENT INSTRUCTIONS
It was a pleasure to see you today in clinic. Here is some helpful information regarding instructions following your surgery.      Stitches: will not dissolve on their own    Follow up:   * If you have a problem or concern post-operatively, please call ahead to schedule an appointment. We may not be able to accommodate a walk-in appointment *     Scars may take 3 months or longer to mature. If you have concerns about your scar at that point, please call our office to schedule a follow up appointment. There are options available to help improve the appearance.     Please continue wound care below once a day for two weeks:    WOUND CARE INSTRUCTIONS   *Washing your hands before touching your bandage and surgical site is extremely important to prevent post-operative infection*    1. Leave your pressure bandage on for 48 hours. You will not need to perform any wound care until this bandage is removed. Do NOT get bandage wet.     2. When you initially begin wound care, you may let the water hit the pressure bandage to loosen it from your skin.     3. Wash your hands thoroughly before starting wound care.     4. After 48 hours, begin cleaning the surgery site once daily with gentle soap (such as Cetaphil, Cerave or Dove). Use a Q-tip with the soap and water on it. Roll the Q-tip along incision line.     5. Dry the area with a fresh cotton tipped applicator/Q-tip.     6. Apply a generous amount of vaseline where you see the sutures. Avoid using Neosporin, or any bacterial ointment as this is likely to cause an allergic reaction to the site.     7. Cut a non-stick bandage to fit then use paper tape to hold in place.     8. You will be using gentle soap and water, vaseline and a bandage once daily for two weeks.     9. After surgery, you may restart all of your medications that were stopped (if applicable).     *If your site is on your forehead, or near your eye, you will want to use ice packs. Please apply ice packs every  hour for 20 minutes while awake. Sleep elevated for the first two nights following surgery*     *For surgical areas on your arms/legs, try to keep the area elevated above the level of your heart as much as possible. Frequent gentle rubbing of your fingers or toes in that area will prevent numbness and stiffness*    *If located on your arm/hand, we ask that you do not lift anything heavier than a gallon of milk for two weeks*    *For surgical areas on your head/neck, do not bend over or stoop. Do not drop your head, as this increases blood to the surgical area and can induce bleeding*       BATHING: Begin bathing once pressure bandage comes off. Do not let direct water pressure hit the surgery site. It is okay if it gets wet, just let the water roll over.   PAIN: You may take Tylenol only for pain in the first 24 hours following surgery. Do not take any aspirin, Ibuprofen, Motrin or Aleve as this may increase your risk for bleeding for the first 24 hours. Significant pain/discomfort is unusual and should be reported to our office.   BLEEDING: A mild amount of blood on the bandage is expected. Blood soaking through the bandage is not normal. If this occurs, apply uninterrupted pressure for 20 minutes by the clock. If this does not stop the bleeding, hold pressure for 20 minutes with an ice pack. If it does not stop after ice, please call our office for additional assistance.       Normal office hour number: [phone]

## 2020-06-17 NOTE — PROCEDURES
Procedures     Elliptical Excision with Intermediate Layered Repair  Date of Service: 6/17/2020  Surgery: Excision  Repair Type: Intermediate Layered Repair  Repair Size: 7.1 cm    Suture Material: 3-0 vicryl, 4-0 PDS  Tumor Type: BCC  Location: left upper back  Derm-Path Accession #: [id number]  Lesion Size: 2.0 x 1.2 cm  Excised Diameter: 2.8 x 2.0 cm  Level of Defect: fat    INDICATIONS:  The patient was scheduled for excision of a BCC of the left upper back. The risks of bleeding, infection,  discomfort, incomplete removal, and scar formation were explained to the patient. All questions  were answered. After informed consent, confirmation of site and identity, and appropriate  instructions, the patient underwent the procedure as follows:  PROCEDURE:  With the patient in a position, the lesion was outlined with 0.4 cm margins. The lesion and the  necessary margin measured 2.8 x 2.0 cm. An ellipse was designed around the lesion to conform to  relaxed skin tension lines in an effort to minimize scarring and deformity. The patient was then  placed in a seated position. The lesion and surrounding skin were prepped with Hibiclens, draped,  and anesthetized with Lidocaine 1% with epinephrine 1:100,000. Using a #10 blade the skin was excised along premarked lines. The resulting defect extended through deep subcutaneous tissue. Wound margins were undermined to limit functional deformity/impairment of adjacent structures. Bleeding vessels were controlled with monopolar electrodesiccation. The dermis and subcutaneous tissue were closed with buried vertical mattress sutures. Epidermal approximation was meticulously refined with 4-0 PDS sutures, resulting in a linear closure with little to no wound tension. Blood loss was  estimated to be less than 5cc. The area was coated with petrolatum and covered with a non-  adherent dressing followed by gauze and tape. Postoperative instructions were reviewed per  protocol. The  patient left alert and fully oriented.  Post-Operative Size: 7.1 cm  Sutures Used: 3-0 Vicryl, 4-0 PDS    Az Silva MD  Department of Dermatology, Mohs Surgery  1:33 PM  6/17/2020

## 2020-06-23 LAB
FINAL PATHOLOGIC DIAGNOSIS: NORMAL
GROSS: NORMAL

## 2021-04-19 ENCOUNTER — TELEPHONE (OUTPATIENT)
Dept: UROLOGY | Facility: CLINIC | Age: 71
End: 2021-04-19

## 2021-05-05 ENCOUNTER — TELEPHONE (OUTPATIENT)
Dept: UROLOGY | Facility: CLINIC | Age: 71
End: 2021-05-05

## 2021-05-11 ENCOUNTER — TELEPHONE (OUTPATIENT)
Dept: RADIOLOGY | Facility: HOSPITAL | Age: 71
End: 2021-05-11

## 2021-05-12 ENCOUNTER — HOSPITAL ENCOUNTER (OUTPATIENT)
Dept: RADIOLOGY | Facility: HOSPITAL | Age: 71
Discharge: HOME | End: 2021-05-12
Attending: UROLOGY
Payer: MEDICARE

## 2021-05-12 DIAGNOSIS — Z12.5 PROSTATE CANCER SCREENING: ICD-10-CM

## 2021-05-12 DIAGNOSIS — I10 HYPERTENSION, UNSPECIFIED TYPE: ICD-10-CM

## 2021-05-12 DIAGNOSIS — N20.0 RENAL STONE: ICD-10-CM

## 2021-05-12 DIAGNOSIS — N13.30 HYDRONEPHROSIS, UNSPECIFIED HYDRONEPHROSIS TYPE: ICD-10-CM

## 2021-05-12 PROCEDURE — 76770 US EXAM ABDO BACK WALL COMP: CPT | Mod: 26,,, | Performed by: RADIOLOGY

## 2021-05-12 PROCEDURE — 74018 XR ABDOMEN AP 1 VIEW: ICD-10-PCS | Mod: 26,,, | Performed by: RADIOLOGY

## 2021-05-12 PROCEDURE — 74018 RADEX ABDOMEN 1 VIEW: CPT | Mod: TC,FY,PO

## 2021-05-12 PROCEDURE — 76770 US RETROPERITONEAL COMPLETE: ICD-10-PCS | Mod: 26,,, | Performed by: RADIOLOGY

## 2021-05-12 PROCEDURE — 76770 US EXAM ABDO BACK WALL COMP: CPT | Mod: TC

## 2021-05-12 PROCEDURE — 74018 RADEX ABDOMEN 1 VIEW: CPT | Mod: 26,,, | Performed by: RADIOLOGY

## 2021-06-03 ENCOUNTER — OFFICE VISIT (OUTPATIENT)
Dept: UROLOGY | Facility: CLINIC | Age: 71
End: 2021-06-03
Payer: MEDICARE

## 2021-06-03 ENCOUNTER — LAB VISIT (OUTPATIENT)
Dept: LAB | Facility: HOSPITAL | Age: 71
End: 2021-06-03
Attending: UROLOGY
Payer: MEDICARE

## 2021-06-03 VITALS
DIASTOLIC BLOOD PRESSURE: 80 MMHG | WEIGHT: 182.31 LBS | BODY MASS INDEX: 27 KG/M2 | SYSTOLIC BLOOD PRESSURE: 118 MMHG | HEIGHT: 69 IN

## 2021-06-03 DIAGNOSIS — Z12.5 PROSTATE CANCER SCREENING: ICD-10-CM

## 2021-06-03 DIAGNOSIS — N13.30 HYDRONEPHROSIS, UNSPECIFIED HYDRONEPHROSIS TYPE: ICD-10-CM

## 2021-06-03 DIAGNOSIS — N13.5 UPJ OBSTRUCTION, ACQUIRED: ICD-10-CM

## 2021-06-03 DIAGNOSIS — N20.0 RENAL STONE: Primary | ICD-10-CM

## 2021-06-03 DIAGNOSIS — Z12.11 COLON CANCER SCREENING: ICD-10-CM

## 2021-06-03 DIAGNOSIS — R97.20 ELEVATED PSA: Primary | ICD-10-CM

## 2021-06-03 LAB
ANION GAP SERPL CALC-SCNC: 8 MMOL/L (ref 8–16)
BILIRUB SERPL-MCNC: NORMAL MG/DL
BLOOD URINE, POC: NORMAL
BUN SERPL-MCNC: 16 MG/DL (ref 8–23)
CALCIUM SERPL-MCNC: 9.2 MG/DL (ref 8.7–10.5)
CHLORIDE SERPL-SCNC: 109 MMOL/L (ref 95–110)
CLARITY, POC UA: CLEAR
CO2 SERPL-SCNC: 26 MMOL/L (ref 23–29)
COLOR, POC UA: YELLOW
COMPLEXED PSA SERPL-MCNC: 6.6 NG/ML (ref 0–4)
CREAT SERPL-MCNC: 1.1 MG/DL (ref 0.5–1.4)
EST. GFR  (AFRICAN AMERICAN): >60 ML/MIN/1.73 M^2
EST. GFR  (NON AFRICAN AMERICAN): >60 ML/MIN/1.73 M^2
GLUCOSE SERPL-MCNC: 86 MG/DL (ref 70–110)
GLUCOSE UR QL STRIP: NORMAL
KETONES UR QL STRIP: NORMAL
LEUKOCYTE ESTERASE URINE, POC: NORMAL
NITRITE, POC UA: NORMAL
PH, POC UA: 5
POTASSIUM SERPL-SCNC: 4.8 MMOL/L (ref 3.5–5.1)
PROTEIN, POC: NORMAL
SODIUM SERPL-SCNC: 143 MMOL/L (ref 136–145)
SPECIFIC GRAVITY, POC UA: 1.02
UROBILINOGEN, POC UA: NORMAL

## 2021-06-03 PROCEDURE — 36415 COLL VENOUS BLD VENIPUNCTURE: CPT | Mod: PO | Performed by: UROLOGY

## 2021-06-03 PROCEDURE — 1101F PR PT FALLS ASSESS DOC 0-1 FALLS W/OUT INJ PAST YR: ICD-10-PCS | Mod: CPTII,S$GLB,, | Performed by: UROLOGY

## 2021-06-03 PROCEDURE — 1101F PT FALLS ASSESS-DOCD LE1/YR: CPT | Mod: CPTII,S$GLB,, | Performed by: UROLOGY

## 2021-06-03 PROCEDURE — 99214 PR OFFICE/OUTPT VISIT, EST, LEVL IV, 30-39 MIN: ICD-10-PCS | Mod: 25,S$GLB,, | Performed by: UROLOGY

## 2021-06-03 PROCEDURE — 84153 ASSAY OF PSA TOTAL: CPT | Performed by: UROLOGY

## 2021-06-03 PROCEDURE — 80048 BASIC METABOLIC PNL TOTAL CA: CPT | Performed by: UROLOGY

## 2021-06-03 PROCEDURE — 1126F AMNT PAIN NOTED NONE PRSNT: CPT | Mod: S$GLB,,, | Performed by: UROLOGY

## 2021-06-03 PROCEDURE — 81002 POCT URINE DIPSTICK WITHOUT MICROSCOPE: ICD-10-PCS | Mod: S$GLB,,, | Performed by: UROLOGY

## 2021-06-03 PROCEDURE — 99999 PR PBB SHADOW E&M-EST. PATIENT-LVL III: ICD-10-PCS | Mod: PBBFAC,,, | Performed by: UROLOGY

## 2021-06-03 PROCEDURE — 81002 URINALYSIS NONAUTO W/O SCOPE: CPT | Mod: S$GLB,,, | Performed by: UROLOGY

## 2021-06-03 PROCEDURE — 1126F PR PAIN SEVERITY QUANTIFIED, NO PAIN PRESENT: ICD-10-PCS | Mod: S$GLB,,, | Performed by: UROLOGY

## 2021-06-03 PROCEDURE — 1159F PR MEDICATION LIST DOCUMENTED IN MEDICAL RECORD: ICD-10-PCS | Mod: S$GLB,,, | Performed by: UROLOGY

## 2021-06-03 PROCEDURE — 3008F BODY MASS INDEX DOCD: CPT | Mod: CPTII,S$GLB,, | Performed by: UROLOGY

## 2021-06-03 PROCEDURE — 3288F FALL RISK ASSESSMENT DOCD: CPT | Mod: CPTII,S$GLB,, | Performed by: UROLOGY

## 2021-06-03 PROCEDURE — 3288F PR FALLS RISK ASSESSMENT DOCUMENTED: ICD-10-PCS | Mod: CPTII,S$GLB,, | Performed by: UROLOGY

## 2021-06-03 PROCEDURE — 99999 PR PBB SHADOW E&M-EST. PATIENT-LVL III: CPT | Mod: PBBFAC,,, | Performed by: UROLOGY

## 2021-06-03 PROCEDURE — 1159F MED LIST DOCD IN RCRD: CPT | Mod: S$GLB,,, | Performed by: UROLOGY

## 2021-06-03 PROCEDURE — 3008F PR BODY MASS INDEX (BMI) DOCUMENTED: ICD-10-PCS | Mod: CPTII,S$GLB,, | Performed by: UROLOGY

## 2021-06-03 PROCEDURE — 99214 OFFICE O/P EST MOD 30 MIN: CPT | Mod: 25,S$GLB,, | Performed by: UROLOGY

## 2021-06-04 ENCOUNTER — TELEPHONE (OUTPATIENT)
Dept: UROLOGY | Facility: CLINIC | Age: 71
End: 2021-06-04

## 2021-06-04 DIAGNOSIS — E78.2 MIXED HYPERLIPIDEMIA: ICD-10-CM

## 2021-06-04 RX ORDER — ATORVASTATIN CALCIUM 40 MG/1
TABLET, FILM COATED ORAL
Qty: 45 TABLET | Refills: 0 | Status: SHIPPED | OUTPATIENT
Start: 2021-06-04 | End: 2021-09-07

## 2021-06-24 RX ORDER — POLYETHYLENE GLYCOL 3350, SODIUM SULFATE ANHYDROUS, SODIUM BICARBONATE, SODIUM CHLORIDE, POTASSIUM CHLORIDE 236; 22.74; 6.74; 5.86; 2.97 G/4L; G/4L; G/4L; G/4L; G/4L
4 POWDER, FOR SOLUTION ORAL ONCE
Qty: 4000 ML | Refills: 0 | Status: SHIPPED | OUTPATIENT
Start: 2021-06-24 | End: 2021-06-24

## 2021-07-07 ENCOUNTER — LAB VISIT (OUTPATIENT)
Dept: LAB | Facility: HOSPITAL | Age: 71
End: 2021-07-07
Attending: UROLOGY
Payer: MEDICARE

## 2021-07-07 DIAGNOSIS — R97.20 ELEVATED PSA: ICD-10-CM

## 2021-07-07 LAB — COMPLEXED PSA SERPL-MCNC: 5.2 NG/ML (ref 0–4)

## 2021-07-07 PROCEDURE — 36415 COLL VENOUS BLD VENIPUNCTURE: CPT | Mod: PO | Performed by: UROLOGY

## 2021-07-07 PROCEDURE — 84153 ASSAY OF PSA TOTAL: CPT | Performed by: UROLOGY

## 2021-07-09 ENCOUNTER — TELEPHONE (OUTPATIENT)
Dept: UROLOGY | Facility: CLINIC | Age: 71
End: 2021-07-09

## 2021-07-09 DIAGNOSIS — E78.2 MIXED HYPERLIPIDEMIA: ICD-10-CM

## 2021-07-09 RX ORDER — GEMFIBROZIL 600 MG/1
TABLET, FILM COATED ORAL
Qty: 180 TABLET | Refills: 0 | Status: SHIPPED | OUTPATIENT
Start: 2021-07-09 | End: 2021-11-08

## 2021-07-20 ENCOUNTER — TELEPHONE (OUTPATIENT)
Dept: UROLOGY | Facility: CLINIC | Age: 71
End: 2021-07-20

## 2021-07-22 ENCOUNTER — NURSE TRIAGE (OUTPATIENT)
Dept: ADMINISTRATIVE | Facility: CLINIC | Age: 71
End: 2021-07-22

## 2021-07-26 ENCOUNTER — TELEPHONE (OUTPATIENT)
Dept: UROLOGY | Facility: CLINIC | Age: 71
End: 2021-07-26

## 2021-07-26 DIAGNOSIS — R97.20 ELEVATED PSA: Primary | ICD-10-CM

## 2021-07-26 DIAGNOSIS — Z12.5 PROSTATE CANCER SCREENING: ICD-10-CM

## 2021-07-27 ENCOUNTER — TELEPHONE (OUTPATIENT)
Dept: UROLOGY | Facility: CLINIC | Age: 71
End: 2021-07-27

## 2021-08-04 ENCOUNTER — TELEPHONE (OUTPATIENT)
Dept: ENDOSCOPY | Facility: HOSPITAL | Age: 71
End: 2021-08-04

## 2021-09-07 DIAGNOSIS — E78.2 MIXED HYPERLIPIDEMIA: ICD-10-CM

## 2021-09-07 RX ORDER — ATORVASTATIN CALCIUM 40 MG/1
TABLET, FILM COATED ORAL
Qty: 45 TABLET | Refills: 0 | Status: SHIPPED | OUTPATIENT
Start: 2021-09-07 | End: 2021-12-06

## 2021-09-23 DIAGNOSIS — I10 ESSENTIAL HYPERTENSION: ICD-10-CM

## 2021-09-23 RX ORDER — LISINOPRIL 10 MG/1
TABLET ORAL
Qty: 90 TABLET | Refills: 0 | Status: SHIPPED | OUTPATIENT
Start: 2021-09-23 | End: 2022-01-02

## 2021-09-23 NOTE — TELEPHONE ENCOUNTER
Patient has not seen Dr. Glez in over a year.  Refill is given but the patient needs an appointment for an updated physical.  
with patient

## 2021-10-20 ENCOUNTER — OFFICE VISIT (OUTPATIENT)
Dept: INTERNAL MEDICINE | Facility: CLINIC | Age: 71
End: 2021-10-20
Payer: MEDICARE

## 2021-10-20 ENCOUNTER — LAB VISIT (OUTPATIENT)
Dept: LAB | Facility: HOSPITAL | Age: 71
End: 2021-10-20
Attending: INTERNAL MEDICINE
Payer: MEDICARE

## 2021-10-20 VITALS
WEIGHT: 181.19 LBS | TEMPERATURE: 98 F | BODY MASS INDEX: 26.84 KG/M2 | SYSTOLIC BLOOD PRESSURE: 122 MMHG | HEIGHT: 69 IN | DIASTOLIC BLOOD PRESSURE: 76 MMHG | HEART RATE: 71 BPM

## 2021-10-20 DIAGNOSIS — I10 ESSENTIAL HYPERTENSION: ICD-10-CM

## 2021-10-20 DIAGNOSIS — Z12.11 COLON CANCER SCREENING: ICD-10-CM

## 2021-10-20 DIAGNOSIS — E78.2 MIXED HYPERLIPIDEMIA: ICD-10-CM

## 2021-10-20 DIAGNOSIS — Z00.00 ROUTINE GENERAL MEDICAL EXAMINATION AT HEALTH CARE FACILITY: Primary | ICD-10-CM

## 2021-10-20 DIAGNOSIS — N28.9 NON-FUNCTIONING KIDNEY: ICD-10-CM

## 2021-10-20 LAB
ALBUMIN SERPL BCP-MCNC: 3.7 G/DL (ref 3.5–5.2)
ALP SERPL-CCNC: 85 U/L (ref 55–135)
ALT SERPL W/O P-5'-P-CCNC: 18 U/L (ref 10–44)
ANION GAP SERPL CALC-SCNC: 12 MMOL/L (ref 8–16)
AST SERPL-CCNC: 23 U/L (ref 10–40)
BASOPHILS # BLD AUTO: 0.05 K/UL (ref 0–0.2)
BASOPHILS NFR BLD: 0.8 % (ref 0–1.9)
BILIRUB SERPL-MCNC: 0.5 MG/DL (ref 0.1–1)
BUN SERPL-MCNC: 15 MG/DL (ref 8–23)
CALCIUM SERPL-MCNC: 9.4 MG/DL (ref 8.7–10.5)
CHLORIDE SERPL-SCNC: 108 MMOL/L (ref 95–110)
CHOLEST SERPL-MCNC: 133 MG/DL (ref 120–199)
CHOLEST/HDLC SERPL: 2.7 {RATIO} (ref 2–5)
CO2 SERPL-SCNC: 21 MMOL/L (ref 23–29)
CREAT SERPL-MCNC: 1.1 MG/DL (ref 0.5–1.4)
DIFFERENTIAL METHOD: ABNORMAL
EOSINOPHIL # BLD AUTO: 0.4 K/UL (ref 0–0.5)
EOSINOPHIL NFR BLD: 5.5 % (ref 0–8)
ERYTHROCYTE [DISTWIDTH] IN BLOOD BY AUTOMATED COUNT: 13.6 % (ref 11.5–14.5)
EST. GFR  (AFRICAN AMERICAN): >60 ML/MIN/1.73 M^2
EST. GFR  (NON AFRICAN AMERICAN): >60 ML/MIN/1.73 M^2
GLUCOSE SERPL-MCNC: 85 MG/DL (ref 70–110)
HCT VFR BLD AUTO: 44.2 % (ref 40–54)
HDLC SERPL-MCNC: 50 MG/DL (ref 40–75)
HDLC SERPL: 37.6 % (ref 20–50)
HGB BLD-MCNC: 14 G/DL (ref 14–18)
IMM GRANULOCYTES # BLD AUTO: 0.03 K/UL (ref 0–0.04)
IMM GRANULOCYTES NFR BLD AUTO: 0.5 % (ref 0–0.5)
LDLC SERPL CALC-MCNC: 72.4 MG/DL (ref 63–159)
LYMPHOCYTES # BLD AUTO: 1.5 K/UL (ref 1–4.8)
LYMPHOCYTES NFR BLD: 23.1 % (ref 18–48)
MCH RBC QN AUTO: 29.2 PG (ref 27–31)
MCHC RBC AUTO-ENTMCNC: 31.7 G/DL (ref 32–36)
MCV RBC AUTO: 92 FL (ref 82–98)
MONOCYTES # BLD AUTO: 0.8 K/UL (ref 0.3–1)
MONOCYTES NFR BLD: 12 % (ref 4–15)
NEUTROPHILS # BLD AUTO: 3.7 K/UL (ref 1.8–7.7)
NEUTROPHILS NFR BLD: 58.1 % (ref 38–73)
NONHDLC SERPL-MCNC: 83 MG/DL
NRBC BLD-RTO: 0 /100 WBC
PLATELET # BLD AUTO: 279 K/UL (ref 150–450)
PMV BLD AUTO: 10.1 FL (ref 9.2–12.9)
POTASSIUM SERPL-SCNC: 4.5 MMOL/L (ref 3.5–5.1)
PROT SERPL-MCNC: 7.3 G/DL (ref 6–8.4)
RBC # BLD AUTO: 4.79 M/UL (ref 4.6–6.2)
SODIUM SERPL-SCNC: 141 MMOL/L (ref 136–145)
TRIGL SERPL-MCNC: 53 MG/DL (ref 30–150)
WBC # BLD AUTO: 6.33 K/UL (ref 3.9–12.7)

## 2021-10-20 PROCEDURE — 99999 PR PBB SHADOW E&M-EST. PATIENT-LVL III: CPT | Mod: PBBFAC,,, | Performed by: INTERNAL MEDICINE

## 2021-10-20 PROCEDURE — 85025 COMPLETE CBC W/AUTO DIFF WBC: CPT | Performed by: INTERNAL MEDICINE

## 2021-10-20 PROCEDURE — 1159F MED LIST DOCD IN RCRD: CPT | Mod: CPTII,S$GLB,, | Performed by: INTERNAL MEDICINE

## 2021-10-20 PROCEDURE — 1160F PR REVIEW ALL MEDS BY PRESCRIBER/CLIN PHARMACIST DOCUMENTED: ICD-10-PCS | Mod: CPTII,S$GLB,, | Performed by: INTERNAL MEDICINE

## 2021-10-20 PROCEDURE — 80061 LIPID PANEL: CPT | Performed by: INTERNAL MEDICINE

## 2021-10-20 PROCEDURE — 3074F PR MOST RECENT SYSTOLIC BLOOD PRESSURE < 130 MM HG: ICD-10-PCS | Mod: CPTII,S$GLB,, | Performed by: INTERNAL MEDICINE

## 2021-10-20 PROCEDURE — 99397 PR PREVENTIVE VISIT,EST,65 & OVER: ICD-10-PCS | Mod: 25,S$GLB,, | Performed by: INTERNAL MEDICINE

## 2021-10-20 PROCEDURE — 99999 PR PBB SHADOW E&M-EST. PATIENT-LVL III: ICD-10-PCS | Mod: PBBFAC,,, | Performed by: INTERNAL MEDICINE

## 2021-10-20 PROCEDURE — 3008F PR BODY MASS INDEX (BMI) DOCUMENTED: ICD-10-PCS | Mod: CPTII,S$GLB,, | Performed by: INTERNAL MEDICINE

## 2021-10-20 PROCEDURE — 36415 COLL VENOUS BLD VENIPUNCTURE: CPT | Mod: PO | Performed by: INTERNAL MEDICINE

## 2021-10-20 PROCEDURE — 1101F PR PT FALLS ASSESS DOC 0-1 FALLS W/OUT INJ PAST YR: ICD-10-PCS | Mod: CPTII,S$GLB,, | Performed by: INTERNAL MEDICINE

## 2021-10-20 PROCEDURE — 90694 VACC AIIV4 NO PRSRV 0.5ML IM: CPT | Mod: S$GLB,,, | Performed by: INTERNAL MEDICINE

## 2021-10-20 PROCEDURE — 90694 FLU VACCINE - QUADRIVALENT - ADJUVANTED: ICD-10-PCS | Mod: S$GLB,,, | Performed by: INTERNAL MEDICINE

## 2021-10-20 PROCEDURE — 80053 COMPREHEN METABOLIC PANEL: CPT | Performed by: INTERNAL MEDICINE

## 2021-10-20 PROCEDURE — G0008 ADMIN INFLUENZA VIRUS VAC: HCPCS | Mod: S$GLB,,, | Performed by: INTERNAL MEDICINE

## 2021-10-20 PROCEDURE — 3008F BODY MASS INDEX DOCD: CPT | Mod: CPTII,S$GLB,, | Performed by: INTERNAL MEDICINE

## 2021-10-20 PROCEDURE — 3078F PR MOST RECENT DIASTOLIC BLOOD PRESSURE < 80 MM HG: ICD-10-PCS | Mod: CPTII,S$GLB,, | Performed by: INTERNAL MEDICINE

## 2021-10-20 PROCEDURE — 3074F SYST BP LT 130 MM HG: CPT | Mod: CPTII,S$GLB,, | Performed by: INTERNAL MEDICINE

## 2021-10-20 PROCEDURE — 3288F FALL RISK ASSESSMENT DOCD: CPT | Mod: CPTII,S$GLB,, | Performed by: INTERNAL MEDICINE

## 2021-10-20 PROCEDURE — 99397 PER PM REEVAL EST PAT 65+ YR: CPT | Mod: 25,S$GLB,, | Performed by: INTERNAL MEDICINE

## 2021-10-20 PROCEDURE — 1159F PR MEDICATION LIST DOCUMENTED IN MEDICAL RECORD: ICD-10-PCS | Mod: CPTII,S$GLB,, | Performed by: INTERNAL MEDICINE

## 2021-10-20 PROCEDURE — 4010F ACE/ARB THERAPY RXD/TAKEN: CPT | Mod: CPTII,S$GLB,, | Performed by: INTERNAL MEDICINE

## 2021-10-20 PROCEDURE — 3078F DIAST BP <80 MM HG: CPT | Mod: CPTII,S$GLB,, | Performed by: INTERNAL MEDICINE

## 2021-10-20 PROCEDURE — 3288F PR FALLS RISK ASSESSMENT DOCUMENTED: ICD-10-PCS | Mod: CPTII,S$GLB,, | Performed by: INTERNAL MEDICINE

## 2021-10-20 PROCEDURE — G0008 FLU VACCINE - QUADRIVALENT - ADJUVANTED: ICD-10-PCS | Mod: S$GLB,,, | Performed by: INTERNAL MEDICINE

## 2021-10-20 PROCEDURE — 1160F RVW MEDS BY RX/DR IN RCRD: CPT | Mod: CPTII,S$GLB,, | Performed by: INTERNAL MEDICINE

## 2021-10-20 PROCEDURE — 1101F PT FALLS ASSESS-DOCD LE1/YR: CPT | Mod: CPTII,S$GLB,, | Performed by: INTERNAL MEDICINE

## 2021-10-20 PROCEDURE — 4010F PR ACE/ARB THEARPY RXD/TAKEN: ICD-10-PCS | Mod: CPTII,S$GLB,, | Performed by: INTERNAL MEDICINE

## 2021-10-21 ENCOUNTER — TELEPHONE (OUTPATIENT)
Dept: UROLOGY | Facility: CLINIC | Age: 71
End: 2021-10-21

## 2021-10-28 ENCOUNTER — TELEPHONE (OUTPATIENT)
Dept: UROLOGY | Facility: CLINIC | Age: 71
End: 2021-10-28
Payer: MEDICARE

## 2021-11-03 DIAGNOSIS — Z12.5 PROSTATE CANCER SCREENING: Primary | ICD-10-CM

## 2021-11-04 RX ORDER — DIAZEPAM 5 MG/1
TABLET ORAL
Qty: 2 TABLET | Refills: 0 | Status: SHIPPED | OUTPATIENT
Start: 2021-11-04 | End: 2022-06-07

## 2021-11-30 ENCOUNTER — TELEPHONE (OUTPATIENT)
Dept: UROLOGY | Facility: CLINIC | Age: 71
End: 2021-11-30
Payer: MEDICARE

## 2021-12-01 ENCOUNTER — HOSPITAL ENCOUNTER (OUTPATIENT)
Dept: RADIOLOGY | Facility: HOSPITAL | Age: 71
Discharge: HOME | End: 2021-12-01
Attending: UROLOGY
Payer: MEDICARE

## 2021-12-01 DIAGNOSIS — R97.20 ELEVATED PSA: ICD-10-CM

## 2021-12-01 DIAGNOSIS — Z12.5 PROSTATE CANCER SCREENING: ICD-10-CM

## 2021-12-01 PROCEDURE — 72197 MRI PELVIS W/O & W/DYE: CPT | Mod: 26,,, | Performed by: RADIOLOGY

## 2021-12-01 PROCEDURE — A9585 GADOBUTROL INJECTION: HCPCS | Performed by: UROLOGY

## 2021-12-01 PROCEDURE — 72197 MRI PROSTATE W W/O CONTRAST: ICD-10-PCS | Mod: 26,,, | Performed by: RADIOLOGY

## 2021-12-01 PROCEDURE — 72197 MRI PELVIS W/O & W/DYE: CPT | Mod: TC

## 2021-12-01 PROCEDURE — 25500020 PHARM REV CODE 255: Performed by: UROLOGY

## 2021-12-01 RX ORDER — GADOBUTROL 604.72 MG/ML
8 INJECTION INTRAVENOUS
Status: COMPLETED | OUTPATIENT
Start: 2021-12-01 | End: 2021-12-01

## 2021-12-01 RX ADMIN — GADOBUTROL 8 ML: 604.72 INJECTION INTRAVENOUS at 03:12

## 2021-12-03 ENCOUNTER — TELEPHONE (OUTPATIENT)
Dept: UROLOGY | Facility: CLINIC | Age: 71
End: 2021-12-03
Payer: MEDICARE

## 2021-12-07 ENCOUNTER — ANESTHESIA EVENT (OUTPATIENT)
Dept: ENDOSCOPY | Facility: HOSPITAL | Age: 71
End: 2021-12-07
Payer: MEDICARE

## 2021-12-08 ENCOUNTER — HOSPITAL ENCOUNTER (OUTPATIENT)
Facility: HOSPITAL | Age: 71
Discharge: HOME | End: 2021-12-08
Attending: INTERNAL MEDICINE | Admitting: INTERNAL MEDICINE
Payer: MEDICARE

## 2021-12-08 ENCOUNTER — ANESTHESIA (OUTPATIENT)
Dept: ENDOSCOPY | Facility: HOSPITAL | Age: 71
End: 2021-12-08
Payer: MEDICARE

## 2021-12-08 VITALS
TEMPERATURE: 98 F | DIASTOLIC BLOOD PRESSURE: 73 MMHG | HEIGHT: 68 IN | SYSTOLIC BLOOD PRESSURE: 119 MMHG | HEART RATE: 71 BPM | WEIGHT: 178 LBS | RESPIRATION RATE: 16 BRPM | BODY MASS INDEX: 26.98 KG/M2 | OXYGEN SATURATION: 98 %

## 2021-12-08 PROCEDURE — 63600175 PHARM REV CODE 636 W HCPCS: Performed by: INTERNAL MEDICINE

## 2021-12-08 PROCEDURE — 25000003 PHARM REV CODE 250: Performed by: NURSE ANESTHETIST, CERTIFIED REGISTERED

## 2021-12-08 PROCEDURE — 37000008 HC ANESTHESIA 1ST 15 MINUTES: Performed by: INTERNAL MEDICINE

## 2021-12-08 PROCEDURE — 37000009 HC ANESTHESIA EA ADD 15 MINS: Performed by: INTERNAL MEDICINE

## 2021-12-08 PROCEDURE — D9220A PRA ANESTHESIA: Mod: CRNA,,, | Performed by: NURSE ANESTHETIST, CERTIFIED REGISTERED

## 2021-12-08 PROCEDURE — D9220A PRA ANESTHESIA: Mod: ANES,,, | Performed by: STUDENT IN AN ORGANIZED HEALTH CARE EDUCATION/TRAINING PROGRAM

## 2021-12-08 PROCEDURE — G0105 COLORECTAL SCRN; HI RISK IND: HCPCS | Performed by: INTERNAL MEDICINE

## 2021-12-08 PROCEDURE — 63600175 PHARM REV CODE 636 W HCPCS: Performed by: NURSE ANESTHETIST, CERTIFIED REGISTERED

## 2021-12-08 PROCEDURE — G0105 COLORECTAL SCRN; HI RISK IND: ICD-10-PCS | Mod: ,,, | Performed by: INTERNAL MEDICINE

## 2021-12-08 PROCEDURE — G0105 COLORECTAL SCRN; HI RISK IND: HCPCS | Mod: ,,, | Performed by: INTERNAL MEDICINE

## 2021-12-08 PROCEDURE — D9220A PRA ANESTHESIA: ICD-10-PCS | Mod: ANES,,, | Performed by: STUDENT IN AN ORGANIZED HEALTH CARE EDUCATION/TRAINING PROGRAM

## 2021-12-08 PROCEDURE — D9220A PRA ANESTHESIA: ICD-10-PCS | Mod: CRNA,,, | Performed by: NURSE ANESTHETIST, CERTIFIED REGISTERED

## 2021-12-08 RX ORDER — PROPOFOL 10 MG/ML
VIAL (ML) INTRAVENOUS
Status: DISCONTINUED | OUTPATIENT
Start: 2021-12-08 | End: 2021-12-08

## 2021-12-08 RX ORDER — LIDOCAINE HYDROCHLORIDE 20 MG/ML
INJECTION, SOLUTION EPIDURAL; INFILTRATION; INTRACAUDAL; PERINEURAL
Status: DISCONTINUED | OUTPATIENT
Start: 2021-12-08 | End: 2021-12-08

## 2021-12-08 RX ORDER — SODIUM CHLORIDE, SODIUM LACTATE, POTASSIUM CHLORIDE, CALCIUM CHLORIDE 600; 310; 30; 20 MG/100ML; MG/100ML; MG/100ML; MG/100ML
INJECTION, SOLUTION INTRAVENOUS CONTINUOUS
Status: DISCONTINUED | OUTPATIENT
Start: 2021-12-08 | End: 2021-12-08 | Stop reason: HOSPADM

## 2021-12-08 RX ADMIN — PROPOFOL 50 MG: 10 INJECTION, EMULSION INTRAVENOUS at 11:12

## 2021-12-08 RX ADMIN — PROPOFOL 25 MG: 10 INJECTION, EMULSION INTRAVENOUS at 11:12

## 2021-12-08 RX ADMIN — SODIUM CHLORIDE, SODIUM LACTATE, POTASSIUM CHLORIDE, AND CALCIUM CHLORIDE: .6; .31; .03; .02 INJECTION, SOLUTION INTRAVENOUS at 10:12

## 2021-12-08 RX ADMIN — LIDOCAINE HYDROCHLORIDE 50 MG: 20 INJECTION, SOLUTION EPIDURAL; INFILTRATION; INTRACAUDAL; PERINEURAL at 11:12

## 2022-04-05 ENCOUNTER — OFFICE VISIT (OUTPATIENT)
Dept: UROLOGY | Facility: CLINIC | Age: 72
End: 2022-04-05
Payer: MEDICARE

## 2022-04-05 ENCOUNTER — LAB VISIT (OUTPATIENT)
Dept: LAB | Facility: HOSPITAL | Age: 72
End: 2022-04-05
Attending: UROLOGY
Payer: MEDICARE

## 2022-04-05 VITALS
WEIGHT: 176.38 LBS | BODY MASS INDEX: 26.82 KG/M2 | SYSTOLIC BLOOD PRESSURE: 110 MMHG | DIASTOLIC BLOOD PRESSURE: 70 MMHG

## 2022-04-05 DIAGNOSIS — N13.5 UPJ OBSTRUCTION, ACQUIRED: ICD-10-CM

## 2022-04-05 DIAGNOSIS — R97.20 ELEVATED PSA: ICD-10-CM

## 2022-04-05 DIAGNOSIS — R97.20 ELEVATED PSA: Primary | ICD-10-CM

## 2022-04-05 LAB — COMPLEXED PSA SERPL-MCNC: 6.6 NG/ML (ref 0–4)

## 2022-04-05 PROCEDURE — 3008F BODY MASS INDEX DOCD: CPT | Mod: CPTII,S$GLB,, | Performed by: UROLOGY

## 2022-04-05 PROCEDURE — 3008F PR BODY MASS INDEX (BMI) DOCUMENTED: ICD-10-PCS | Mod: CPTII,S$GLB,, | Performed by: UROLOGY

## 2022-04-05 PROCEDURE — 99999 PR PBB SHADOW E&M-EST. PATIENT-LVL II: CPT | Mod: PBBFAC,,, | Performed by: UROLOGY

## 2022-04-05 PROCEDURE — 3074F PR MOST RECENT SYSTOLIC BLOOD PRESSURE < 130 MM HG: ICD-10-PCS | Mod: CPTII,S$GLB,, | Performed by: UROLOGY

## 2022-04-05 PROCEDURE — 1159F MED LIST DOCD IN RCRD: CPT | Mod: CPTII,S$GLB,, | Performed by: UROLOGY

## 2022-04-05 PROCEDURE — 3078F DIAST BP <80 MM HG: CPT | Mod: CPTII,S$GLB,, | Performed by: UROLOGY

## 2022-04-05 PROCEDURE — 4010F PR ACE/ARB THEARPY RXD/TAKEN: ICD-10-PCS | Mod: CPTII,S$GLB,, | Performed by: UROLOGY

## 2022-04-05 PROCEDURE — 36415 COLL VENOUS BLD VENIPUNCTURE: CPT | Mod: PO | Performed by: UROLOGY

## 2022-04-05 PROCEDURE — 99999 PR PBB SHADOW E&M-EST. PATIENT-LVL II: ICD-10-PCS | Mod: PBBFAC,,, | Performed by: UROLOGY

## 2022-04-05 PROCEDURE — 1126F AMNT PAIN NOTED NONE PRSNT: CPT | Mod: CPTII,S$GLB,, | Performed by: UROLOGY

## 2022-04-05 PROCEDURE — 99213 PR OFFICE/OUTPT VISIT, EST, LEVL III, 20-29 MIN: ICD-10-PCS | Mod: S$GLB,,, | Performed by: UROLOGY

## 2022-04-05 PROCEDURE — 3074F SYST BP LT 130 MM HG: CPT | Mod: CPTII,S$GLB,, | Performed by: UROLOGY

## 2022-04-05 PROCEDURE — 4010F ACE/ARB THERAPY RXD/TAKEN: CPT | Mod: CPTII,S$GLB,, | Performed by: UROLOGY

## 2022-04-05 PROCEDURE — 1126F PR PAIN SEVERITY QUANTIFIED, NO PAIN PRESENT: ICD-10-PCS | Mod: CPTII,S$GLB,, | Performed by: UROLOGY

## 2022-04-05 PROCEDURE — 3288F FALL RISK ASSESSMENT DOCD: CPT | Mod: CPTII,S$GLB,, | Performed by: UROLOGY

## 2022-04-05 PROCEDURE — 84153 ASSAY OF PSA TOTAL: CPT | Performed by: UROLOGY

## 2022-04-05 PROCEDURE — 1101F PR PT FALLS ASSESS DOC 0-1 FALLS W/OUT INJ PAST YR: ICD-10-PCS | Mod: CPTII,S$GLB,, | Performed by: UROLOGY

## 2022-04-05 PROCEDURE — 99213 OFFICE O/P EST LOW 20 MIN: CPT | Mod: S$GLB,,, | Performed by: UROLOGY

## 2022-04-05 PROCEDURE — 1159F PR MEDICATION LIST DOCUMENTED IN MEDICAL RECORD: ICD-10-PCS | Mod: CPTII,S$GLB,, | Performed by: UROLOGY

## 2022-04-05 PROCEDURE — 3288F PR FALLS RISK ASSESSMENT DOCUMENTED: ICD-10-PCS | Mod: CPTII,S$GLB,, | Performed by: UROLOGY

## 2022-04-05 PROCEDURE — 3078F PR MOST RECENT DIASTOLIC BLOOD PRESSURE < 80 MM HG: ICD-10-PCS | Mod: CPTII,S$GLB,, | Performed by: UROLOGY

## 2022-04-05 PROCEDURE — 1101F PT FALLS ASSESS-DOCD LE1/YR: CPT | Mod: CPTII,S$GLB,, | Performed by: UROLOGY

## 2022-04-05 NOTE — PROGRESS NOTES
CC: follow up elevated PSA    History of Present Illness:   Rahul FOSTER Parent is a 71 y.o. male here for follow up.     4/5/22-MRI read as PI RADS 2. 47cc gland. Good stream, no stranguria. No gross hematuria. No flank pain. Decided that he wanted to further watch the PSA before deciding on biopsy.   6/3/21-States that he is doing fine. No pain. No recent passage of stones. Hasn't had stone passage in at least 10-15 years. No gross hematuria. Stream is good. No stranguria/hesitancy/nocturia. Not sure if he was aware of gallstones in the past.   KUB 5/2021 personally reviewed, showing 1.3cm LLP renal stone, cholelithiasis  Renal US with stable L hdyro    Per Dukes:  5/25/20: PSA bouncing a bit but still at baseline.  Reviewed history in detail. US last year shows a 1.1 cm left lower pole stone.  11/26/18: Mag3 is normal.  40/60 L/R function with acceptable drainage curves.  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  Reviewed history in detail.  6/14/18: No complaints at all, no flank pain after stent removal.  PSA normal.  3/21/18: Cysto/stent pull today uneventful  3/12/18: Left URS shows no obvious UPJ obstruction, no masses, kidney relatively normal.  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    2/21/18: CT Urogram shows a high inserting ureter and obvious poor left function.  No way to say when this happened.  1/24/18: CT showed a left hydronephrosis with no obstructing UPJ stone, possibly congenital, possibly changes from stones.  No sig stones now to treat.  1/18/17: 67 yo pt of Dr. Valentino here for annual exam.  Feels well no complaints.  PSA has bumped at times, no biopsies.  Had a kidney stone 10 years ago he passed on his own.  Had other stones before that.    Review of Systems   Constitutional: Negative for chills and fever.   Respiratory: Negative for shortness of breath.    Cardiovascular: Negative for chest pain.   Gastrointestinal: Negative for abdominal pain.   Genitourinary:  Negative for flank pain and hematuria.   All other systems reviewed and are negative.      Current Outpatient Medications   Medication Sig Dispense Refill    aspirin (ECOTRIN) 81 MG EC tablet Take 81 mg by mouth once daily.      atorvastatin (LIPITOR) 40 MG tablet TAKE 1/2 TABLET BY MOUTH ONCE A DAY 45 tablet 3    diazePAM (VALIUM) 5 MG tablet Take 1 po 45 minutes before visit. (Patient not taking: Reported on 4/5/2022) 2 tablet 0    docusate sodium (COLACE) 100 MG capsule Take 1 capsule (100 mg total) by mouth 2 (two) times daily. 60 capsule 0    gemfibroziL (LOPID) 600 MG tablet TAKE 1 TABLET BY MOUTH TWICE DAILY BEFORE MEALS 180 tablet 3    lisinopriL 10 MG tablet Take 1 tablet by mouth ONCE A DAY 90 tablet 3     No current facility-administered medications for this visit.       Review of patient's allergies indicates:  No Known Allergies    Past medical, family, and social history reviewed as documented in chart with pertinent positive medical, family, and social history detailed in HPI.    Physical Exam  Vitals:    04/05/22 1037   BP: 110/70       General: Well-developed, well-nourished, in no acute distress  HEENT: Normocephalic, atraumatic, extraocular eye movements in tact  Neck: supple, trachea midline, no cervical or supraclavicular adenopathy  Respirations: Even and unlabored  Abdomen: soft, Non-tender, Non-distended, no palpable masses, no rebound or guarding  Rectal: 40gram prostate without nodules or tenderness. No gross blood.   Extremities: Moves all extremities equally, atraumatic, no clubbing, cyanosis, edema  Skin: warm and dry, no lesions  Psych: normal affect  Neuro: Alert and oriented x 3. Cranial nerves II-XII grossly intact    Urinalysis  Negative for blood, LE, nitrite    PSA    11/26/18: 4.1  11/26/19: 6.4  5/20/20: 4.3  6/3/21: 6.6  7/7/21: 5.2      Assessment:   1. Elevated PSA  POCT URINE DIPSTICK WITHOUT MICROSCOPE    Prostate Specific Antigen, Diagnostic   2. UPJ obstruction,  acquired           Plan:  Elevated PSA  -     POCT URINE DIPSTICK WITHOUT MICROSCOPE  -     Prostate Specific Antigen, Diagnostic; Future; Expected date: 04/05/2022    UPJ obstruction, acquired       Renal US next month    Follow up in about 6 months (around 10/5/2022).

## 2022-04-21 ENCOUNTER — TELEPHONE (OUTPATIENT)
Dept: UROLOGY | Facility: CLINIC | Age: 72
End: 2022-04-21
Payer: MEDICARE

## 2022-04-21 RX ORDER — LEVOFLOXACIN 500 MG/1
TABLET, FILM COATED ORAL
Qty: 1 TABLET | Refills: 0 | Status: SHIPPED | OUTPATIENT
Start: 2022-04-21 | End: 2022-06-07

## 2022-04-21 NOTE — TELEPHONE ENCOUNTER
Attempted to contact pt regarding Prostate Biopsy procedure. No answer. Unable to leave voicemail. Apptm mail to address In file and instructions

## 2022-04-26 ENCOUNTER — TELEPHONE (OUTPATIENT)
Dept: UROLOGY | Facility: CLINIC | Age: 72
End: 2022-04-26
Payer: MEDICARE

## 2022-04-26 NOTE — TELEPHONE ENCOUNTER
Attempted to contact pt regarding appt for biopsy with Dr. Joy on 5-26-22. Appt has to be moved to the following day, 5-27-22, due to Dr. Joy being at the [facility] location on 5-26-22 for procedures. No answer. Left voicemail with details. Appt moved and mailed to patient, AL, LPN.

## 2022-04-27 ENCOUNTER — TELEPHONE (OUTPATIENT)
Dept: UROLOGY | Facility: CLINIC | Age: 72
End: 2022-04-27
Payer: MEDICARE

## 2022-04-27 NOTE — TELEPHONE ENCOUNTER
----- Message from Sharon Taylor sent at 4/27/2022 10:33 AM CDT -----  Contact: Rahulkaran Kay would like a call back at [phone], Regards to getting his procedure reschedule for a sooner day than 5/27/22 due to his work schedule.    Thanks  Td

## 2022-04-27 NOTE — TELEPHONE ENCOUNTER
Called and spoke with pt, pt wanting to push his prostate biopsy up sooner due to him going back to work 3 days after the 27th. Pt states he wants to do it a few days earlier just incase something happens he will already be off. Pt rescheduled to 5/24/22 and pt is aware of 5/23/22 imaging and labs. Pt also educated to stop blood thinner 1 week ahead, antibiotic 1 hour prior to appointment time, and pt to have a enema or good bowel movement the morning of. Pt dante.

## 2022-05-23 ENCOUNTER — HOSPITAL ENCOUNTER (OUTPATIENT)
Dept: RADIOLOGY | Facility: HOSPITAL | Age: 72
Discharge: HOME | End: 2022-05-23
Attending: UROLOGY
Payer: MEDICARE

## 2022-05-23 DIAGNOSIS — N20.0 RENAL STONE: ICD-10-CM

## 2022-05-23 DIAGNOSIS — N13.30 HYDRONEPHROSIS, UNSPECIFIED HYDRONEPHROSIS TYPE: ICD-10-CM

## 2022-05-23 PROCEDURE — 74018 RADEX ABDOMEN 1 VIEW: CPT | Mod: TC,FY,PO

## 2022-05-23 PROCEDURE — 76770 US EXAM ABDO BACK WALL COMP: CPT | Mod: TC

## 2022-05-23 PROCEDURE — 76770 US EXAM ABDO BACK WALL COMP: CPT | Mod: 26,,, | Performed by: RADIOLOGY

## 2022-05-23 PROCEDURE — 76770 US RETROPERITONEAL COMPLETE: ICD-10-PCS | Mod: 26,,, | Performed by: RADIOLOGY

## 2022-05-23 PROCEDURE — 74018 XR ABDOMEN AP 1 VIEW: ICD-10-PCS | Mod: 26,,, | Performed by: RADIOLOGY

## 2022-05-23 PROCEDURE — 74018 RADEX ABDOMEN 1 VIEW: CPT | Mod: 26,,, | Performed by: RADIOLOGY

## 2022-05-24 ENCOUNTER — PROCEDURE VISIT (OUTPATIENT)
Dept: UROLOGY | Facility: CLINIC | Age: 72
End: 2022-05-24
Payer: MEDICARE

## 2022-05-24 VITALS — WEIGHT: 176.38 LBS | BODY MASS INDEX: 26.82 KG/M2

## 2022-05-24 DIAGNOSIS — N13.5 UPJ OBSTRUCTION, ACQUIRED: ICD-10-CM

## 2022-05-24 DIAGNOSIS — R97.20 ELEVATED PSA: Primary | ICD-10-CM

## 2022-05-24 DIAGNOSIS — N28.9 NON-FUNCTIONING KIDNEY: ICD-10-CM

## 2022-05-24 DIAGNOSIS — K80.20 CALCULUS OF GALLBLADDER WITHOUT CHOLECYSTITIS WITHOUT OBSTRUCTION: ICD-10-CM

## 2022-05-24 PROCEDURE — 76872 PR US TRANSRECTAL: ICD-10-PCS | Mod: 26,S$GLB,, | Performed by: UROLOGY

## 2022-05-24 PROCEDURE — 96372 THER/PROPH/DIAG INJ SC/IM: CPT | Mod: 59,S$GLB,, | Performed by: UROLOGY

## 2022-05-24 PROCEDURE — 96372 PR INJECTION,THERAP/PROPH/DIAG2ST, IM OR SUBCUT: ICD-10-PCS | Mod: 59,S$GLB,, | Performed by: UROLOGY

## 2022-05-24 PROCEDURE — 55700 PR BIOPSY OF PROSTATE,NEEDLE/PUNCH: ICD-10-PCS | Mod: S$GLB,,, | Performed by: UROLOGY

## 2022-05-24 PROCEDURE — 88305 TISSUE EXAM BY PATHOLOGIST: ICD-10-PCS | Mod: 26,,, | Performed by: PATHOLOGY

## 2022-05-24 PROCEDURE — 55700 PR BIOPSY OF PROSTATE,NEEDLE/PUNCH: CPT | Mod: S$GLB,,, | Performed by: UROLOGY

## 2022-05-24 PROCEDURE — 88305 TISSUE EXAM BY PATHOLOGIST: CPT | Performed by: PATHOLOGY

## 2022-05-24 PROCEDURE — 88305 TISSUE EXAM BY PATHOLOGIST: CPT | Mod: 26,,, | Performed by: PATHOLOGY

## 2022-05-24 PROCEDURE — 76872 US TRANSRECTAL: CPT | Mod: 26,S$GLB,, | Performed by: UROLOGY

## 2022-05-24 RX ORDER — CEFTRIAXONE 1 G/1
1 INJECTION, POWDER, FOR SOLUTION INTRAMUSCULAR; INTRAVENOUS
Status: COMPLETED | OUTPATIENT
Start: 2022-05-24 | End: 2022-05-24

## 2022-05-24 RX ORDER — LIDOCAINE HYDROCHLORIDE 20 MG/ML
JELLY TOPICAL
Status: COMPLETED | OUTPATIENT
Start: 2022-05-24 | End: 2022-05-24

## 2022-05-24 RX ADMIN — CEFTRIAXONE 1 G: 1 INJECTION, POWDER, FOR SOLUTION INTRAMUSCULAR; INTRAVENOUS at 02:05

## 2022-05-24 RX ADMIN — LIDOCAINE HYDROCHLORIDE 10 ML: 20 JELLY TOPICAL at 02:05

## 2022-05-24 NOTE — PROGRESS NOTES
Per Dr. Joy IM rocephin given to pt via right ventrogluteal using aseptic technique. Pt tolerated well. Pt instructed to remain in clinic for 15 minutes after injection to monitor for signs & symptoms of adverse reaction. Pt verbalized understanding.

## 2022-05-24 NOTE — PROCEDURES
CC: follow up elevated PSA    History of Present Illness:   Rahul FOSTER Parent is a 71 y.o. male here for follow up.     5/24/22- Here for TRUS biopsy, 45g prostate. Renal US personally reviewed, stable. Cholelithiasis. Pt denies RUQ pain.   4/5/22-MRI read as PI RADS 2. 47cc gland. Good stream, no stranguria. No gross hematuria. No flank pain. Decided that he wanted to further watch the PSA before deciding on biopsy.   6/3/21-States that he is doing fine. No pain. No recent passage of stones. Hasn't had stone passage in at least 10-15 years. No gross hematuria. Stream is good. No stranguria/hesitancy/nocturia. Not sure if he was aware of gallstones in the past.   KUB 5/2021 personally reviewed, showing 1.3cm LLP renal stone, cholelithiasis  Renal US with stable L hdyro    Per Ernst:  5/25/20: PSA bouncing a bit but still at baseline.  Reviewed history in detail. US last year shows a 1.1 cm left lower pole stone.  11/26/18: Mag3 is normal.  40/60 L/R function with acceptable drainage curves.  Has been at [employer] as [facility] for 10 years.  Reviewed history in detail.  6/14/18: No complaints at all, no flank pain after stent removal.  PSA normal.  3/21/18: Cysto/stent pull today uneventful  3/12/18: Left URS shows no obvious UPJ obstruction, no masses, kidney relatively normal.  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    2/21/18: CT Urogram shows a high inserting ureter and obvious poor left function.  No way to say when this happened.  1/24/18: CT showed a left hydronephrosis with no obstructing UPJ stone, possibly congenital, possibly changes from stones.  No sig stones now to treat.  1/18/17: 65 yo pt of Dr. Valentino here for annual exam.  Feels well no complaints.  PSA has bumped at times, no biopsies.  Had a kidney stone 10 years ago he passed on his own.  Had other stones before that.    Review of Systems   Constitutional: Negative for chills and fever.   Respiratory: Negative for shortness of  breath.    Cardiovascular: Negative for chest pain.   Gastrointestinal: Negative for abdominal pain.   Genitourinary: Negative for flank pain and hematuria.   All other systems reviewed and are negative.      Current Outpatient Medications   Medication Sig Dispense Refill    aspirin (ECOTRIN) 81 MG EC tablet Take 81 mg by mouth once daily.      atorvastatin (LIPITOR) 40 MG tablet TAKE 1/2 TABLET BY MOUTH ONCE A DAY 45 tablet 3    diazePAM (VALIUM) 5 MG tablet Take 1 po 45 minutes before visit. (Patient not taking: Reported on 4/5/2022) 2 tablet 0    docusate sodium (COLACE) 100 MG capsule Take 1 capsule (100 mg total) by mouth 2 (two) times daily. 60 capsule 0    gemfibroziL (LOPID) 600 MG tablet TAKE 1 TABLET BY MOUTH TWICE DAILY BEFORE MEALS 180 tablet 3    levoFLOXacin (LEVAQUIN) 500 MG tablet Take 1 po 1 hour before biopsy 1 tablet 0    lisinopriL 10 MG tablet Take 1 tablet by mouth ONCE A DAY 90 tablet 3     No current facility-administered medications for this visit.       Review of patient's allergies indicates:  No Known Allergies    Past medical, family, and social history reviewed as documented in chart with pertinent positive medical, family, and social history detailed in HPI.    Physical Exam  There were no vitals filed for this visit.    General: Well-developed, well-nourished, in no acute distress  HEENT: Normocephalic, atraumatic, extraocular eye movements in tact  Neck: supple, trachea midline, no cervical or supraclavicular adenopathy  Respirations: Even and unlabored  Abdomen: soft, Non-tender, Non-distended, no palpable masses, no rebound or guarding  Rectal: 40gram prostate without nodules or tenderness. No gross blood.   Extremities: Moves all extremities equally, atraumatic, no clubbing, cyanosis, edema  Skin: warm and dry, no lesions  Psych: normal affect  Neuro: Alert and oriented x 3. Cranial nerves II-XII grossly intact    Urinalysis  Negative for blood, LE,  nitrite    PSA    11/26/18: 4.1  11/26/19: 6.4  5/20/20: 4.3  6/3/21: 6.6  7/7/21: 5.2  4/5/22: 6.6  5/23/22: 7.4      Procedure: (1) Transrectal Prostate Biopsy                      (2) Transrectal ultrasound of prostate                     (3) Ultrasound Guidance of Prostate Biopsy needles    Detail: Antibiotic prophylaxis was provided using levaquin and rocephin. After proper consents were obtained, the patient was prepped and draped in normal fashion in the left lateral decubitus position for TRUS/Bx.  5 ml of lidocaine jelly was instilled in the rectum.  Rectal exam noted a smooth prostate. The U/S with rectal probe was into the patient's rectum.  A spinal needle was used and 10ml of 1% lidocaine was instilled on the side of the prostate at the base of the seminal vesicles. Systematic review was performed of the prostate, noting no ultrasonic lesions. The prostate measured to be 45g. Biopsy was then performed using an 18Ga biopsy needle directed at the base, mid and apex bilaterally for a total of 12 cores. The patient tolerated the procedure well. Estimated blood loss was 2cc.       Assessment:   1. Elevated PSA  Specimen to Pathology Urology (prostate biopsy)   2. UPJ obstruction, acquired     3. Non-functioning kidney     4. Calculus of gallbladder without cholecystitis without obstruction           Plan:  Elevated PSA  -     Specimen to Pathology Urology (prostate biopsy)    UPJ obstruction, acquired    Non-functioning kidney    Calculus of gallbladder without cholecystitis without obstruction    Other orders  -     cefTRIAXone injection 1 g  -     LIDOcaine HCl 2% urojet         I had a detailed discussion with the patient regarding post-TRUS biopsy fever and need to go to the ED for admission for IV antibiotics for any temperature above 100.4 degrees.   Discussed cholelithiasis. Pt not symptomatic. Will hold off on referral at this point.   Follow up in about 2 weeks (around 6/7/2022).

## 2022-06-02 LAB
FINAL PATHOLOGIC DIAGNOSIS: NORMAL
Lab: NORMAL

## 2022-06-07 ENCOUNTER — OFFICE VISIT (OUTPATIENT)
Dept: UROLOGY | Facility: CLINIC | Age: 72
End: 2022-06-07
Payer: MEDICARE

## 2022-06-07 VITALS
WEIGHT: 178.38 LBS | BODY MASS INDEX: 27.12 KG/M2 | DIASTOLIC BLOOD PRESSURE: 80 MMHG | SYSTOLIC BLOOD PRESSURE: 117 MMHG

## 2022-06-07 DIAGNOSIS — R97.20 ELEVATED PSA: Primary | ICD-10-CM

## 2022-06-07 DIAGNOSIS — N20.0 RENAL STONES: ICD-10-CM

## 2022-06-07 DIAGNOSIS — K80.20 CALCULUS OF GALLBLADDER WITHOUT CHOLECYSTITIS WITHOUT OBSTRUCTION: ICD-10-CM

## 2022-06-07 PROCEDURE — 99213 OFFICE O/P EST LOW 20 MIN: CPT | Mod: S$GLB,,, | Performed by: UROLOGY

## 2022-06-07 PROCEDURE — 3074F SYST BP LT 130 MM HG: CPT | Mod: CPTII,S$GLB,, | Performed by: UROLOGY

## 2022-06-07 PROCEDURE — 3008F PR BODY MASS INDEX (BMI) DOCUMENTED: ICD-10-PCS | Mod: CPTII,S$GLB,, | Performed by: UROLOGY

## 2022-06-07 PROCEDURE — 3074F PR MOST RECENT SYSTOLIC BLOOD PRESSURE < 130 MM HG: ICD-10-PCS | Mod: CPTII,S$GLB,, | Performed by: UROLOGY

## 2022-06-07 PROCEDURE — 3079F PR MOST RECENT DIASTOLIC BLOOD PRESSURE 80-89 MM HG: ICD-10-PCS | Mod: CPTII,S$GLB,, | Performed by: UROLOGY

## 2022-06-07 PROCEDURE — 4010F ACE/ARB THERAPY RXD/TAKEN: CPT | Mod: CPTII,S$GLB,, | Performed by: UROLOGY

## 2022-06-07 PROCEDURE — 3008F BODY MASS INDEX DOCD: CPT | Mod: CPTII,S$GLB,, | Performed by: UROLOGY

## 2022-06-07 PROCEDURE — 3079F DIAST BP 80-89 MM HG: CPT | Mod: CPTII,S$GLB,, | Performed by: UROLOGY

## 2022-06-07 PROCEDURE — 1126F PR PAIN SEVERITY QUANTIFIED, NO PAIN PRESENT: ICD-10-PCS | Mod: CPTII,S$GLB,, | Performed by: UROLOGY

## 2022-06-07 PROCEDURE — 99999 PR PBB SHADOW E&M-EST. PATIENT-LVL II: ICD-10-PCS | Mod: PBBFAC,,, | Performed by: UROLOGY

## 2022-06-07 PROCEDURE — 99999 PR PBB SHADOW E&M-EST. PATIENT-LVL II: CPT | Mod: PBBFAC,,, | Performed by: UROLOGY

## 2022-06-07 PROCEDURE — 99213 PR OFFICE/OUTPT VISIT, EST, LEVL III, 20-29 MIN: ICD-10-PCS | Mod: S$GLB,,, | Performed by: UROLOGY

## 2022-06-07 PROCEDURE — 4010F PR ACE/ARB THEARPY RXD/TAKEN: ICD-10-PCS | Mod: CPTII,S$GLB,, | Performed by: UROLOGY

## 2022-06-07 PROCEDURE — 1126F AMNT PAIN NOTED NONE PRSNT: CPT | Mod: CPTII,S$GLB,, | Performed by: UROLOGY

## 2022-06-07 NOTE — PROGRESS NOTES
CC: follow up elevated PSA    History of Present Illness:   Rahul FOSTER Parent is a 71 y.o. male here for follow up.     6/7/22- TRUS biopsy benign. Not much gross hematuria, now resolved. No fever or dysuria. No perineal pain. Minimal rectal bleeding. Denies flank pain or RUQ pain/bloating  5/24/22- Here for TRUS biopsy, 45g prostate. Renal US personally reviewed, stable. Cholelithiasis. Pt denies RUQ pain.   4/5/22-MRI read as PI RADS 2. 47cc gland. Good stream, no stranguria. No gross hematuria. No flank pain. Decided that he wanted to further watch the PSA before deciding on biopsy.   6/3/21-States that he is doing fine. No pain. No recent passage of stones. Hasn't had stone passage in at least 10-15 years. No gross hematuria. Stream is good. No stranguria/hesitancy/nocturia. Not sure if he was aware of gallstones in the past.   KUB 5/2021 personally reviewed, showing 1.3cm LLP renal stone, cholelithiasis  Renal US with stable L hdyro    Per Dukes:  5/25/20: PSA bouncing a bit but still at baseline.  Reviewed history in detail. US last year shows a 1.1 cm left lower pole stone.  11/26/18: Mag3 is normal.  40/60 L/R function with acceptable drainage curves.  Has been at [facility] as [facility] for 10 years.  Reviewed history in detail.  6/14/18: No complaints at all, no flank pain after stent removal.  PSA normal.  3/21/18: Cysto/stent pull today uneventful  3/12/18: Left URS shows no obvious UPJ obstruction, no masses, kidney relatively normal.  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    2/21/18: CT Urogram shows a high inserting ureter and obvious poor left function.  No way to say when this happened.  1/24/18: CT showed a left hydronephrosis with no obstructing UPJ stone, possibly congenital, possibly changes from stones.  No sig stones now to treat.  1/18/17: 65 yo pt of Dr. Valentino here for annual exam.  Feels well no complaints.  PSA has bumped at times, no biopsies.  Had a kidney stone 10  years ago he passed on his own.  Had other stones before that.    Review of Systems   Constitutional: Negative for chills and fever.   Respiratory: Negative for shortness of breath.    Cardiovascular: Negative for chest pain.   Gastrointestinal: Negative for abdominal pain.   Genitourinary: Negative for flank pain and hematuria.   All other systems reviewed and are negative.      Current Outpatient Medications   Medication Sig Dispense Refill    aspirin (ECOTRIN) 81 MG EC tablet Take 81 mg by mouth once daily.      atorvastatin (LIPITOR) 40 MG tablet TAKE 1/2 TABLET BY MOUTH ONCE A DAY 45 tablet 3    docusate sodium (COLACE) 100 MG capsule Take 1 capsule (100 mg total) by mouth 2 (two) times daily. 60 capsule 0    gemfibroziL (LOPID) 600 MG tablet TAKE 1 TABLET BY MOUTH TWICE DAILY BEFORE MEALS 180 tablet 3    lisinopriL 10 MG tablet Take 1 tablet by mouth ONCE A DAY 90 tablet 3     No current facility-administered medications for this visit.       Review of patient's allergies indicates:  No Known Allergies    Past medical, family, and social history reviewed as documented in chart with pertinent positive medical, family, and social history detailed in HPI.    Physical Exam  Vitals:    06/07/22 1137   BP: 117/80       General: Well-developed, well-nourished, in no acute distress  HEENT: Normocephalic, atraumatic, extraocular eye movements in tact  Neck: supple, trachea midline, no cervical or supraclavicular adenopathy  Respirations: Even and unlabored  Abdomen: soft, Non-tender, Non-distended, no palpable masses, no rebound or guarding  Rectal: 40gram prostate without nodules or tenderness. No gross blood.   Extremities: Moves all extremities equally, atraumatic, no clubbing, cyanosis, edema  Skin: warm and dry, no lesions  Psych: normal affect  Neuro: Alert and oriented x 3. Cranial nerves II-XII grossly intact    Urinalysis  Negative for blood, LE, nitrite    PSA    11/26/18: 4.1  11/26/19: 6.4  5/20/20:  4.3  6/3/21: 6.6  7/7/21: 5.2  4/5/22: 6.6  5/23/22: 7.4        Assessment:   1. Elevated PSA  Prostate Specific Antigen, Diagnostic   2. Renal stones  X-Ray Abdomen AP 1 View    US Retroperitoneal Complete (Kidney and   3. Calculus of gallbladder without cholecystitis without obstruction           Plan:  Elevated PSA  -     Prostate Specific Antigen, Diagnostic; Future; Expected date: 12/07/2022    Renal stones  -     X-Ray Abdomen AP 1 View; Future; Expected date: 12/16/2022  -     US Retroperitoneal Complete (Kidney and; Future; Expected date: 12/16/2022    Calculus of gallbladder without cholecystitis without obstruction     Path discussed. Discussed need for continued surveillance. Pt not symptomatic from cholelithiasis, but discussed symptoms to be aware of.       Follow up in about 6 months (around 12/7/2022) for labs before visit, KUB before visit.

## 2022-11-14 ENCOUNTER — LAB VISIT (OUTPATIENT)
Dept: LAB | Facility: HOSPITAL | Age: 72
End: 2022-11-14
Attending: INTERNAL MEDICINE
Payer: MEDICARE

## 2022-11-14 ENCOUNTER — OFFICE VISIT (OUTPATIENT)
Dept: INTERNAL MEDICINE | Facility: CLINIC | Age: 72
End: 2022-11-14
Payer: MEDICARE

## 2022-11-14 VITALS
WEIGHT: 180.75 LBS | HEART RATE: 72 BPM | DIASTOLIC BLOOD PRESSURE: 84 MMHG | SYSTOLIC BLOOD PRESSURE: 132 MMHG | TEMPERATURE: 98 F | HEIGHT: 68 IN | OXYGEN SATURATION: 98 % | BODY MASS INDEX: 27.39 KG/M2

## 2022-11-14 DIAGNOSIS — H61.23 BILATERAL IMPACTED CERUMEN: ICD-10-CM

## 2022-11-14 DIAGNOSIS — Z00.00 ROUTINE GENERAL MEDICAL EXAMINATION AT HEALTH CARE FACILITY: ICD-10-CM

## 2022-11-14 DIAGNOSIS — E78.2 MIXED HYPERLIPIDEMIA: ICD-10-CM

## 2022-11-14 DIAGNOSIS — Z00.00 ROUTINE GENERAL MEDICAL EXAMINATION AT HEALTH CARE FACILITY: Primary | ICD-10-CM

## 2022-11-14 DIAGNOSIS — I10 ESSENTIAL HYPERTENSION: ICD-10-CM

## 2022-11-14 LAB
ALBUMIN SERPL BCP-MCNC: 3.7 G/DL (ref 3.5–5.2)
ALP SERPL-CCNC: 95 U/L (ref 55–135)
ALT SERPL W/O P-5'-P-CCNC: 22 U/L (ref 10–44)
ANION GAP SERPL CALC-SCNC: 7 MMOL/L (ref 8–16)
AST SERPL-CCNC: 23 U/L (ref 10–40)
BILIRUB SERPL-MCNC: 0.5 MG/DL (ref 0.1–1)
BUN SERPL-MCNC: 18 MG/DL (ref 8–23)
CALCIUM SERPL-MCNC: 9.2 MG/DL (ref 8.7–10.5)
CHLORIDE SERPL-SCNC: 107 MMOL/L (ref 95–110)
CHOLEST SERPL-MCNC: 137 MG/DL (ref 120–199)
CHOLEST/HDLC SERPL: 2.4 {RATIO} (ref 2–5)
CO2 SERPL-SCNC: 27 MMOL/L (ref 23–29)
CREAT SERPL-MCNC: 0.9 MG/DL (ref 0.5–1.4)
EST. GFR  (NO RACE VARIABLE): >60 ML/MIN/1.73 M^2
GLUCOSE SERPL-MCNC: 74 MG/DL (ref 70–110)
HDLC SERPL-MCNC: 56 MG/DL (ref 40–75)
HDLC SERPL: 40.9 % (ref 20–50)
LDLC SERPL CALC-MCNC: 70.4 MG/DL (ref 63–159)
NONHDLC SERPL-MCNC: 81 MG/DL
POTASSIUM SERPL-SCNC: 4.6 MMOL/L (ref 3.5–5.1)
PROT SERPL-MCNC: 7.1 G/DL (ref 6–8.4)
SODIUM SERPL-SCNC: 141 MMOL/L (ref 136–145)
TRIGL SERPL-MCNC: 53 MG/DL (ref 30–150)

## 2022-11-14 PROCEDURE — 90694 FLU VACCINE - QUADRIVALENT - ADJUVANTED: ICD-10-PCS | Mod: S$GLB,,, | Performed by: INTERNAL MEDICINE

## 2022-11-14 PROCEDURE — 85025 COMPLETE CBC W/AUTO DIFF WBC: CPT | Performed by: INTERNAL MEDICINE

## 2022-11-14 PROCEDURE — 3075F PR MOST RECENT SYSTOLIC BLOOD PRESS GE 130-139MM HG: ICD-10-PCS | Mod: CPTII,S$GLB,, | Performed by: INTERNAL MEDICINE

## 2022-11-14 PROCEDURE — 3079F PR MOST RECENT DIASTOLIC BLOOD PRESSURE 80-89 MM HG: ICD-10-PCS | Mod: CPTII,S$GLB,, | Performed by: INTERNAL MEDICINE

## 2022-11-14 PROCEDURE — 3288F PR FALLS RISK ASSESSMENT DOCUMENTED: ICD-10-PCS | Mod: CPTII,S$GLB,, | Performed by: INTERNAL MEDICINE

## 2022-11-14 PROCEDURE — 4010F ACE/ARB THERAPY RXD/TAKEN: CPT | Mod: CPTII,S$GLB,, | Performed by: INTERNAL MEDICINE

## 2022-11-14 PROCEDURE — 1101F PR PT FALLS ASSESS DOC 0-1 FALLS W/OUT INJ PAST YR: ICD-10-PCS | Mod: CPTII,S$GLB,, | Performed by: INTERNAL MEDICINE

## 2022-11-14 PROCEDURE — 3079F DIAST BP 80-89 MM HG: CPT | Mod: CPTII,S$GLB,, | Performed by: INTERNAL MEDICINE

## 2022-11-14 PROCEDURE — 4010F PR ACE/ARB THEARPY RXD/TAKEN: ICD-10-PCS | Mod: CPTII,S$GLB,, | Performed by: INTERNAL MEDICINE

## 2022-11-14 PROCEDURE — G0008 FLU VACCINE - QUADRIVALENT - ADJUVANTED: ICD-10-PCS | Mod: S$GLB,,, | Performed by: INTERNAL MEDICINE

## 2022-11-14 PROCEDURE — 80061 LIPID PANEL: CPT | Performed by: INTERNAL MEDICINE

## 2022-11-14 PROCEDURE — 1160F PR REVIEW ALL MEDS BY PRESCRIBER/CLIN PHARMACIST DOCUMENTED: ICD-10-PCS | Mod: CPTII,S$GLB,, | Performed by: INTERNAL MEDICINE

## 2022-11-14 PROCEDURE — 99999 PR PBB SHADOW E&M-EST. PATIENT-LVL IV: ICD-10-PCS | Mod: PBBFAC,,, | Performed by: INTERNAL MEDICINE

## 2022-11-14 PROCEDURE — 99999 PR PBB SHADOW E&M-EST. PATIENT-LVL IV: CPT | Mod: PBBFAC,,, | Performed by: INTERNAL MEDICINE

## 2022-11-14 PROCEDURE — 1160F RVW MEDS BY RX/DR IN RCRD: CPT | Mod: CPTII,S$GLB,, | Performed by: INTERNAL MEDICINE

## 2022-11-14 PROCEDURE — 1101F PT FALLS ASSESS-DOCD LE1/YR: CPT | Mod: CPTII,S$GLB,, | Performed by: INTERNAL MEDICINE

## 2022-11-14 PROCEDURE — 36415 COLL VENOUS BLD VENIPUNCTURE: CPT | Mod: PO | Performed by: INTERNAL MEDICINE

## 2022-11-14 PROCEDURE — 3008F PR BODY MASS INDEX (BMI) DOCUMENTED: ICD-10-PCS | Mod: CPTII,S$GLB,, | Performed by: INTERNAL MEDICINE

## 2022-11-14 PROCEDURE — G0008 ADMIN INFLUENZA VIRUS VAC: HCPCS | Mod: S$GLB,,, | Performed by: INTERNAL MEDICINE

## 2022-11-14 PROCEDURE — 3288F FALL RISK ASSESSMENT DOCD: CPT | Mod: CPTII,S$GLB,, | Performed by: INTERNAL MEDICINE

## 2022-11-14 PROCEDURE — 90694 VACC AIIV4 NO PRSRV 0.5ML IM: CPT | Mod: S$GLB,,, | Performed by: INTERNAL MEDICINE

## 2022-11-14 PROCEDURE — 1159F MED LIST DOCD IN RCRD: CPT | Mod: CPTII,S$GLB,, | Performed by: INTERNAL MEDICINE

## 2022-11-14 PROCEDURE — 3075F SYST BP GE 130 - 139MM HG: CPT | Mod: CPTII,S$GLB,, | Performed by: INTERNAL MEDICINE

## 2022-11-14 PROCEDURE — 3008F BODY MASS INDEX DOCD: CPT | Mod: CPTII,S$GLB,, | Performed by: INTERNAL MEDICINE

## 2022-11-14 PROCEDURE — 80053 COMPREHEN METABOLIC PANEL: CPT | Performed by: INTERNAL MEDICINE

## 2022-11-14 PROCEDURE — 99214 OFFICE O/P EST MOD 30 MIN: CPT | Mod: 25,S$GLB,, | Performed by: INTERNAL MEDICINE

## 2022-11-14 PROCEDURE — 1159F PR MEDICATION LIST DOCUMENTED IN MEDICAL RECORD: ICD-10-PCS | Mod: CPTII,S$GLB,, | Performed by: INTERNAL MEDICINE

## 2022-11-14 PROCEDURE — 99214 PR OFFICE/OUTPT VISIT, EST, LEVL IV, 30-39 MIN: ICD-10-PCS | Mod: 25,S$GLB,, | Performed by: INTERNAL MEDICINE

## 2022-11-14 NOTE — PROGRESS NOTES
"Subjective:       Patient ID: Rahul FOSTER Parent is a 72 y.o. male.    Chief Complaint: Annual Exam    HPI Patient is a 72-year-old male presented for updated physical exam review of chronic health issues.  Patient has history of hypertension, hyperlipidemia.  He indicates he has been doing well.  Taking his medications as prescribed.  He is concerned he may have some earwax buildup as he has been having some decreased hearing and he has had Urex issues in the past.      He has recently seen Neurology and undergone MRI of the prostate and prostate biopsies.  Everything came back looking good at this time there continue to monitor with him.    Review of Systems   Constitutional:  Negative for fever and unexpected weight change.   HENT:  Negative for hearing loss, postnasal drip and rhinorrhea.    Eyes:  Negative for pain and visual disturbance.   Respiratory:  Negative for cough, shortness of breath and wheezing.    Cardiovascular:  Negative for chest pain and palpitations.   Gastrointestinal:  Negative for constipation, diarrhea, nausea and vomiting.   Genitourinary:  Negative for dysuria and hematuria.   Musculoskeletal:  Negative for arthralgias, back pain, myalgias and neck stiffness.   Skin:  Negative for pallor and rash.   Neurological:  Negative for seizures, syncope and headaches.   Hematological:  Negative for adenopathy.   Psychiatric/Behavioral:  Negative for dysphoric mood. The patient is not nervous/anxious.      Objective:   /84   Pulse 72   Temp 97.6 °F (36.4 °C)   Ht 5' 8" (1.727 m)   Wt 82 kg (180 lb 12.4 oz)   SpO2 98%   BMI 27.49 kg/m²      Physical Exam  Vitals reviewed.   Constitutional:       General: He is not in acute distress.     Appearance: He is well-developed.   HENT:      Head: Normocephalic and atraumatic.      Right Ear: Tympanic membrane and ear canal normal.      Left Ear: Tympanic membrane and ear canal normal.   Eyes:      Pupils: Pupils are equal, round, and reactive to " "light.   Neck:      Thyroid: No thyromegaly.      Vascular: No JVD.   Cardiovascular:      Rate and Rhythm: Normal rate and regular rhythm.      Heart sounds: Normal heart sounds. No murmur heard.    No friction rub. No gallop.   Pulmonary:      Effort: Pulmonary effort is normal.      Breath sounds: Normal breath sounds. No wheezing or rales.   Abdominal:      General: Bowel sounds are normal. There is no distension.      Palpations: Abdomen is soft.      Tenderness: There is no abdominal tenderness. There is no guarding or rebound.   Musculoskeletal:         General: Normal range of motion.      Cervical back: Normal range of motion and neck supple.   Lymphadenopathy:      Cervical: No cervical adenopathy.   Skin:     General: Skin is warm and dry.      Findings: No rash.   Neurological:      General: No focal deficit present.      Mental Status: He is alert and oriented to person, place, and time.      Cranial Nerves: No cranial nerve deficit.      Deep Tendon Reflexes: Reflexes are normal and symmetric.   Psychiatric:         Mood and Affect: Mood normal.         Judgment: Judgment normal.       No visits with results within 2 Week(s) from this visit.   Latest known visit with results is:   Procedure visit on 05/24/2022   Component Date Value    Final Pathologic Diagnos* 05/24/2022                      Value:[facility] DIAGNOSIS:  A.   PROSTATE, LEFT BASE, NEEDLE BIOPSY:         Benign prostatic tissue.  B.    PROSTATE, LEFT MID, NEEDLE BIOPSY:          Benign prostatic tissue.  C.    PROSTATE, LEFT APEX, NEEDLE BIOPSY:          Benign prostatic tissue.  D.    PROSTATE, RIGHT BASE, NEEDLE BIOPSY:          Benign prostatic tissue.  E.    PROSTATE, RIGHT MID, NEEDLE BIOPSY:         Benign prostatic tissue.  F.    PROSTATE, RIGHT APEX, NEEDLE BIOPSY:         Benign prostatic tissue.    Orquidea Mullen M.D.  Report attached.  Performing site:  [facility]  [address] [place] [address]  "Disclaimer:  This " "case diagnosis was rendered completely by the outside  consultation pathologist and the case is electronically signed by an [facility]  pathologist listed below solely to release the report into the medical  record."      Disclaimer 05/24/2022                      Value:Unless the case is a 'gross only' or additional testing only, the final  diagnosis for each specimen is based on a microscopic examination of  appropriate tissue sections.         Assessment:       1. Routine general medical examination at health care facility    2. Essential hypertension    3. Mixed hyperlipidemia    4. Bilateral impacted cerumen          Plan:   Essential hypertension  Blood pressure is under good control.  We will continue the current regimen.  Will work on regular aerobic exercise and a low salt diet.          Hyperlipidemia  Cholesterol numbers look good.  We will continue the current regimen at this time.  Remain focused on low fat diet and high dietary fiber intake.       Update labs  Routine general medical examination at health care facility  Comments:  focus on good health habits, low fat diet, regular exercise, seatbelt use, sunscreen use  Orders:  -     CBC Auto Differential; Future; Expected date: 11/14/2022  -     Comprehensive Metabolic Panel; Future; Expected date: 11/14/2022  -     Lipid Panel; Future; Expected date: 11/14/2022    Essential hypertension  -     CBC Auto Differential; Future; Expected date: 11/14/2022  -     Comprehensive Metabolic Panel; Future; Expected date: 11/14/2022    Mixed hyperlipidemia  -     Lipid Panel; Future; Expected date: 11/14/2022    Bilateral impacted cerumen  -     Ambulatory referral/consult to ENT; Future; Expected date: 11/21/2022    Other orders  -     Influenza - Quadrivalent (Adjuvanted)          Follow up in about 1 year (around 11/14/2023).    "

## 2022-11-14 NOTE — ASSESSMENT & PLAN NOTE
Blood pressure is under good control.  We will continue the current regimen.  Will work on regular aerobic exercise and a low salt diet.

## 2022-11-14 NOTE — ASSESSMENT & PLAN NOTE
Cholesterol numbers look good.  We will continue the current regimen at this time.  Remain focused on low fat diet and high dietary fiber intake.       Update labs

## 2022-11-15 ENCOUNTER — TELEPHONE (OUTPATIENT)
Dept: INTERNAL MEDICINE | Facility: CLINIC | Age: 72
End: 2022-11-15
Payer: MEDICARE

## 2022-11-15 ENCOUNTER — OFFICE VISIT (OUTPATIENT)
Dept: OTOLARYNGOLOGY | Facility: CLINIC | Age: 72
End: 2022-11-15
Payer: MEDICARE

## 2022-11-15 VITALS — TEMPERATURE: 98 F | WEIGHT: 183.19 LBS | BODY MASS INDEX: 27.86 KG/M2

## 2022-11-15 DIAGNOSIS — H61.23 BILATERAL IMPACTED CERUMEN: ICD-10-CM

## 2022-11-15 LAB
BASOPHILS # BLD AUTO: 0.04 K/UL (ref 0–0.2)
BASOPHILS NFR BLD: 0.6 % (ref 0–1.9)
DIFFERENTIAL METHOD: ABNORMAL
EOSINOPHIL # BLD AUTO: 0.3 K/UL (ref 0–0.5)
EOSINOPHIL NFR BLD: 4.2 % (ref 0–8)
ERYTHROCYTE [DISTWIDTH] IN BLOOD BY AUTOMATED COUNT: 13.2 % (ref 11.5–14.5)
HCT VFR BLD AUTO: 45.3 % (ref 40–54)
HGB BLD-MCNC: 14.4 G/DL (ref 14–18)
IMM GRANULOCYTES # BLD AUTO: 0.06 K/UL (ref 0–0.04)
IMM GRANULOCYTES NFR BLD AUTO: 0.9 % (ref 0–0.5)
LYMPHOCYTES # BLD AUTO: 1.5 K/UL (ref 1–4.8)
LYMPHOCYTES NFR BLD: 22.6 % (ref 18–48)
MCH RBC QN AUTO: 30.7 PG (ref 27–31)
MCHC RBC AUTO-ENTMCNC: 31.8 G/DL (ref 32–36)
MCV RBC AUTO: 97 FL (ref 82–98)
MONOCYTES # BLD AUTO: 0.7 K/UL (ref 0.3–1)
MONOCYTES NFR BLD: 10.6 % (ref 4–15)
NEUTROPHILS # BLD AUTO: 4.1 K/UL (ref 1.8–7.7)
NEUTROPHILS NFR BLD: 61.1 % (ref 38–73)
NRBC BLD-RTO: 0 /100 WBC
PLATELET # BLD AUTO: 258 K/UL (ref 150–450)
PMV BLD AUTO: 10.1 FL (ref 9.2–12.9)
RBC # BLD AUTO: 4.69 M/UL (ref 4.6–6.2)
WBC # BLD AUTO: 6.69 K/UL (ref 3.9–12.7)

## 2022-11-15 PROCEDURE — 99499 NO LOS: ICD-10-PCS | Mod: S$GLB,,, | Performed by: OTOLARYNGOLOGY

## 2022-11-15 PROCEDURE — 3288F FALL RISK ASSESSMENT DOCD: CPT | Mod: CPTII,S$GLB,, | Performed by: OTOLARYNGOLOGY

## 2022-11-15 PROCEDURE — 4010F PR ACE/ARB THEARPY RXD/TAKEN: ICD-10-PCS | Mod: CPTII,S$GLB,, | Performed by: OTOLARYNGOLOGY

## 2022-11-15 PROCEDURE — 1101F PT FALLS ASSESS-DOCD LE1/YR: CPT | Mod: CPTII,S$GLB,, | Performed by: OTOLARYNGOLOGY

## 2022-11-15 PROCEDURE — 3008F BODY MASS INDEX DOCD: CPT | Mod: CPTII,S$GLB,, | Performed by: OTOLARYNGOLOGY

## 2022-11-15 PROCEDURE — 3288F PR FALLS RISK ASSESSMENT DOCUMENTED: ICD-10-PCS | Mod: CPTII,S$GLB,, | Performed by: OTOLARYNGOLOGY

## 2022-11-15 PROCEDURE — 99999 PR PBB SHADOW E&M-EST. PATIENT-LVL III: CPT | Mod: PBBFAC,,, | Performed by: OTOLARYNGOLOGY

## 2022-11-15 PROCEDURE — 1101F PR PT FALLS ASSESS DOC 0-1 FALLS W/OUT INJ PAST YR: ICD-10-PCS | Mod: CPTII,S$GLB,, | Performed by: OTOLARYNGOLOGY

## 2022-11-15 PROCEDURE — 1126F AMNT PAIN NOTED NONE PRSNT: CPT | Mod: CPTII,S$GLB,, | Performed by: OTOLARYNGOLOGY

## 2022-11-15 PROCEDURE — 4010F ACE/ARB THERAPY RXD/TAKEN: CPT | Mod: CPTII,S$GLB,, | Performed by: OTOLARYNGOLOGY

## 2022-11-15 PROCEDURE — 1159F MED LIST DOCD IN RCRD: CPT | Mod: CPTII,S$GLB,, | Performed by: OTOLARYNGOLOGY

## 2022-11-15 PROCEDURE — 1126F PR PAIN SEVERITY QUANTIFIED, NO PAIN PRESENT: ICD-10-PCS | Mod: CPTII,S$GLB,, | Performed by: OTOLARYNGOLOGY

## 2022-11-15 PROCEDURE — 1159F PR MEDICATION LIST DOCUMENTED IN MEDICAL RECORD: ICD-10-PCS | Mod: CPTII,S$GLB,, | Performed by: OTOLARYNGOLOGY

## 2022-11-15 PROCEDURE — 99499 UNLISTED E&M SERVICE: CPT | Mod: S$GLB,,, | Performed by: OTOLARYNGOLOGY

## 2022-11-15 PROCEDURE — 3008F PR BODY MASS INDEX (BMI) DOCUMENTED: ICD-10-PCS | Mod: CPTII,S$GLB,, | Performed by: OTOLARYNGOLOGY

## 2022-11-15 PROCEDURE — 99999 PR PBB SHADOW E&M-EST. PATIENT-LVL III: ICD-10-PCS | Mod: PBBFAC,,, | Performed by: OTOLARYNGOLOGY

## 2022-11-15 PROCEDURE — 69210 REMOVE IMPACTED EAR WAX UNI: CPT | Mod: S$GLB,,, | Performed by: OTOLARYNGOLOGY

## 2022-11-15 PROCEDURE — 69210 PR REMOVAL IMPACTED CERUMEN REQUIRING INSTRUMENTATION, UNILATERAL: ICD-10-PCS | Mod: S$GLB,,, | Performed by: OTOLARYNGOLOGY

## 2022-11-15 NOTE — TELEPHONE ENCOUNTER
----- Message from Whitley Harvey sent at 11/15/2022 12:33 PM CST -----  Contact: Rahul  .Type:  Patient Returning Call    Who Called:Rahul  Who Left Message for Patient:yes  Does the patient know what this is regarding?:no  Would the patient rather a call back or a response via [facility]? Call back  Best Call Back Number:[phone]  Additional Information: na        Thanks  EMILY

## 2022-11-15 NOTE — PROGRESS NOTES
REFERRING PROVIDER  Tyler Glez Md  [address] [place] [address]  Subjective:   Patient: Rahul Yip [id number], :1950   Visit date:11/15/2022 2:26 PM    Chief Complaint:  Cerumen Impaction (Does not get them cleaned on a regular basis, no pain or pressure)        HPI:     Rahul FOSTER Parent is a 72 y.o. male whom I am asked to see for evaluation of otalgia or hearing loss in both ears for the past 1-4 weeks.   Rahul rates the severity as moderate.  No exacerbating or relieving factors.  There is no drainage from the ears.      His meds, allergies, medical, surgical, social & family histories were reviewed & updated:  -     He has a current medication list which includes the following prescription(s): aspirin, atorvastatin, docusate sodium, gemfibrozil, and lisinopril.  -     He  has a past medical history of Anticoagulant long-term use, Basal cell carcinoma (2020), Colon polyp, Coronary artery disease, Elevated PSA, HLD (hyperlipidemia), HTN (hypertension), and Kidney stone ().   -     He does not have any pertinent problems on file.   -     He  has a past surgical history that includes Colonoscopy (N/A, 3/28/2016) and Colonoscopy (N/A, 2021).  -     He  reports that he has never smoked. He has never used smokeless tobacco. He reports current alcohol use. He reports that he does not use drugs.  -     His family history is not on file.  -     He has No Known Allergies.      Review of Systems:  -     Allergic/Immunologic: has No Known Allergies..  -     Constitutional: Current temp: 97.5 °F (36.4 °C) (Temporal)        Objective:     Physical Exam:  Vitals:  Temp 97.5 °F (36.4 °C) (Temporal)   Wt 83.1 kg (183 lb 3.2 oz)   BMI 27.86 kg/m²   Communication:  Able to communicate, no hoarseness.  Head & Face:  Normocephalic, atraumatic, no sinus tenderness.  Eyes:  Extraocular motions intact.  Ears:  Otoscopy of external auditory canals reveals impaction of bilateral  ear canals.  With the patient in the supine position, we used the operating microscope to examine both ears with the appropriate sized ear speculum.  A variety of sterile, micro-instruments were utilized to remove the cerumen atraumatically from the impacted ear(s).   After removal, the ears were reexamined-  Right Ear:  No mass/lesion of auricle. The external auditory canals is without erythema or discharge. Pneumatic otoscopy of the tympanic membrane revealed no perforation and good mobility, with no fluid in middle ear. Clinical speech reception thresholds grossly normal.  Left Ear:  No mass/lesion of auricle. The external auditory canals is without erythema or discharge. Pneumatic otoscopy of the tympanic membrane revealed no perforation and good mobility, with no fluid in middle ear. Clinical speech reception thresholds grossly normal  Nose:  No masses/lesions of external nose, nasal mucosa, septum, and turbinates were within normal limits.  Mouth:  No mass/lesion of lips, teeth, gums, hard/soft palate, tongue, tonsils, or oropharynx.  Neck & Lymphatics:  No cervical lymphadenopathy, no neck mass/crepitus/ asymmetry, trachea is midline, no thyroid enlargement/tenderness/mass.  Neuro/Psych: Alert with normal mood and affect.   Respiration/Chest:  Symmetric expansion during respiration, normal respiratory effort.  Skin:  Warm and intact.    Assessment & Plan:       -     Cerumen Impaction - Rahul has cerumen impaction.  We discussed preventative measures and treatment options.  Q-tips must be avoided, instead the ears can be cleaned with OTC ear rinses (or a mixture of alcohol & vinegar in equal parts).   For hard wax, Rahul may place mineral oil/baby oil in the ear with a cotton ball at night and remove in the shower.  This will assist in softening the wax and allow it to drain out on its own. If the cerumen impacts the ear canal and causes hearing loss or infection he needs to follow-up in the clinic for  treatment and cleaning.

## 2022-12-05 ENCOUNTER — HOSPITAL ENCOUNTER (OUTPATIENT)
Dept: RADIOLOGY | Facility: HOSPITAL | Age: 72
Discharge: HOME | End: 2022-12-05
Attending: UROLOGY
Payer: MEDICARE

## 2022-12-05 DIAGNOSIS — N20.0 RENAL STONES: ICD-10-CM

## 2022-12-05 PROCEDURE — 76770 US RETROPERITONEAL COMPLETE: ICD-10-PCS | Mod: 26,,, | Performed by: RADIOLOGY

## 2022-12-05 PROCEDURE — 76770 US EXAM ABDO BACK WALL COMP: CPT | Mod: TC,PO

## 2022-12-05 PROCEDURE — 76770 US EXAM ABDO BACK WALL COMP: CPT | Mod: 26,,, | Performed by: RADIOLOGY

## 2022-12-07 ENCOUNTER — HOSPITAL ENCOUNTER (OUTPATIENT)
Dept: RADIOLOGY | Facility: HOSPITAL | Age: 72
Discharge: HOME | End: 2022-12-07
Attending: UROLOGY
Payer: MEDICARE

## 2022-12-07 ENCOUNTER — OFFICE VISIT (OUTPATIENT)
Dept: UROLOGY | Facility: CLINIC | Age: 72
End: 2022-12-07
Payer: MEDICARE

## 2022-12-07 VITALS
DIASTOLIC BLOOD PRESSURE: 72 MMHG | BODY MASS INDEX: 27.25 KG/M2 | WEIGHT: 179.25 LBS | SYSTOLIC BLOOD PRESSURE: 120 MMHG | HEART RATE: 64 BPM

## 2022-12-07 DIAGNOSIS — R97.20 ELEVATED PSA: ICD-10-CM

## 2022-12-07 DIAGNOSIS — N20.0 RENAL STONES: ICD-10-CM

## 2022-12-07 DIAGNOSIS — N13.5 UPJ OBSTRUCTION, ACQUIRED: ICD-10-CM

## 2022-12-07 DIAGNOSIS — K80.20 CALCULUS OF GALLBLADDER WITHOUT CHOLECYSTITIS WITHOUT OBSTRUCTION: ICD-10-CM

## 2022-12-07 DIAGNOSIS — N20.0 RENAL STONE: Primary | ICD-10-CM

## 2022-12-07 PROCEDURE — 4010F ACE/ARB THERAPY RXD/TAKEN: CPT | Mod: CPTII,S$GLB,, | Performed by: UROLOGY

## 2022-12-07 PROCEDURE — 1159F MED LIST DOCD IN RCRD: CPT | Mod: CPTII,S$GLB,, | Performed by: UROLOGY

## 2022-12-07 PROCEDURE — 99214 OFFICE O/P EST MOD 30 MIN: CPT | Mod: S$GLB,,, | Performed by: UROLOGY

## 2022-12-07 PROCEDURE — 3074F PR MOST RECENT SYSTOLIC BLOOD PRESSURE < 130 MM HG: ICD-10-PCS | Mod: CPTII,S$GLB,, | Performed by: UROLOGY

## 2022-12-07 PROCEDURE — 3008F PR BODY MASS INDEX (BMI) DOCUMENTED: ICD-10-PCS | Mod: CPTII,S$GLB,, | Performed by: UROLOGY

## 2022-12-07 PROCEDURE — 1126F AMNT PAIN NOTED NONE PRSNT: CPT | Mod: CPTII,S$GLB,, | Performed by: UROLOGY

## 2022-12-07 PROCEDURE — 3078F PR MOST RECENT DIASTOLIC BLOOD PRESSURE < 80 MM HG: ICD-10-PCS | Mod: CPTII,S$GLB,, | Performed by: UROLOGY

## 2022-12-07 PROCEDURE — 3008F BODY MASS INDEX DOCD: CPT | Mod: CPTII,S$GLB,, | Performed by: UROLOGY

## 2022-12-07 PROCEDURE — 1159F PR MEDICATION LIST DOCUMENTED IN MEDICAL RECORD: ICD-10-PCS | Mod: CPTII,S$GLB,, | Performed by: UROLOGY

## 2022-12-07 PROCEDURE — 3078F DIAST BP <80 MM HG: CPT | Mod: CPTII,S$GLB,, | Performed by: UROLOGY

## 2022-12-07 PROCEDURE — 99999 PR PBB SHADOW E&M-EST. PATIENT-LVL III: CPT | Mod: PBBFAC,,, | Performed by: UROLOGY

## 2022-12-07 PROCEDURE — 74018 RADEX ABDOMEN 1 VIEW: CPT | Mod: TC,PN

## 2022-12-07 PROCEDURE — 1126F PR PAIN SEVERITY QUANTIFIED, NO PAIN PRESENT: ICD-10-PCS | Mod: CPTII,S$GLB,, | Performed by: UROLOGY

## 2022-12-07 PROCEDURE — 99214 PR OFFICE/OUTPT VISIT, EST, LEVL IV, 30-39 MIN: ICD-10-PCS | Mod: S$GLB,,, | Performed by: UROLOGY

## 2022-12-07 PROCEDURE — 99999 PR PBB SHADOW E&M-EST. PATIENT-LVL III: ICD-10-PCS | Mod: PBBFAC,,, | Performed by: UROLOGY

## 2022-12-07 PROCEDURE — 4010F PR ACE/ARB THEARPY RXD/TAKEN: ICD-10-PCS | Mod: CPTII,S$GLB,, | Performed by: UROLOGY

## 2022-12-07 PROCEDURE — 3074F SYST BP LT 130 MM HG: CPT | Mod: CPTII,S$GLB,, | Performed by: UROLOGY

## 2022-12-07 NOTE — PROGRESS NOTES
CC: follow up elevated PSA    History of Present Illness:   Rahul FOSTER Parent is a 72 y.o. male here for follow up.     12/7/22- Renal US personally reviewed, showing stable L hydro. Non-obstructing L renal stones also seen, measuring up to 11mm. KUB personally reviewed, showing numerous gallstones. No recent stone passage. No flank pain. No RUQ pain. Good stream. No gross hematuria.   6/7/22- TRUS biopsy benign. Not much gross hematuria, now resolved. No fever or dysuria. No perineal pain. Minimal rectal bleeding. Denies flank pain or RUQ pain/bloating  5/24/22- Here for TRUS biopsy, 45g prostate. Renal US personally reviewed, stable. Cholelithiasis. Pt denies RUQ pain.   4/5/22-MRI read as PI RADS 2. 47cc gland. Good stream, no stranguria. No gross hematuria. No flank pain. Decided that he wanted to further watch the PSA before deciding on biopsy.   6/3/21-States that he is doing fine. No pain. No recent passage of stones. Hasn't had stone passage in at least 10-15 years. No gross hematuria. Stream is good. No stranguria/hesitancy/nocturia. Not sure if he was aware of gallstones in the past.   KUB 5/2021 personally reviewed, showing 1.3cm LLP renal stone, cholelithiasis  Renal US with stable L hdyro    Per [facility]:  5/25/20: PSA bouncing a bit but still at baseline.  Reviewed history in detail. US last year shows a 1.1 cm left lower pole stone.  11/26/18: Mag3 is normal.  40/60 L/R function with acceptable drainage curves.  Has been at [facility] as [facility] for 10 years.  Reviewed history in detail.  6/14/18: No complaints at all, no flank pain after stent removal.  PSA normal.  3/21/18: Cysto/stent pull today uneventful  3/12/18: Left URS shows no obvious UPJ obstruction, no masses, kidney relatively normal.  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    2/21/18: CT Urogram shows a high inserting ureter and obvious poor left function.  No way to say when this happened.  1/24/18: CT showed a left  hydronephrosis with no obstructing UPJ stone, possibly congenital, possibly changes from stones.  No sig stones now to treat.  1/18/17: 67 yo pt of Dr. Valentino here for annual exam.  Feels well no complaints.  PSA has bumped at times, no biopsies.  Had a kidney stone 10 years ago he passed on his own.  Had other stones before that.    Review of Systems   Constitutional:  Negative for chills and fever.   Respiratory:  Negative for shortness of breath.    Cardiovascular:  Negative for chest pain.   Gastrointestinal:  Negative for abdominal pain.   Genitourinary:  Negative for flank pain and hematuria.   All other systems reviewed and are negative.    Current Outpatient Medications   Medication Sig Dispense Refill    aspirin (ECOTRIN) 81 MG EC tablet Take 81 mg by mouth once daily.      atorvastatin (LIPITOR) 40 MG tablet TAKE 1/2 TABLET BY MOUTH ONCE A DAY 45 tablet 0    docusate sodium (COLACE) 100 MG capsule Take 1 capsule (100 mg total) by mouth 2 (two) times daily. 60 capsule 0    gemfibroziL (LOPID) 600 MG tablet TAKE 1 TABLET BY MOUTH TWICE DAILY BEFORE MEALS 180 tablet 3    lisinopriL 10 MG tablet Take 1 tablet by mouth ONCE A DAY 90 tablet 3     No current facility-administered medications for this visit.       Review of patient's allergies indicates:  No Known Allergies    Past medical, family, and social history reviewed as documented in chart with pertinent positive medical, family, and social history detailed in HPI.    Physical Exam  Vitals:    12/07/22 0935   BP: 120/72   Pulse: 64         General: Well-developed, well-nourished, in no acute distress  HEENT: Normocephalic, atraumatic, extraocular eye movements in tact  Neck: supple, trachea midline, no cervical or supraclavicular adenopathy  Respirations: Even and unlabored  Abdomen: soft, Non-tender, Non-distended, no palpable masses, no rebound or guarding  Rectal: 6/7/22-40gram prostate without nodules or tenderness. No gross blood.   Extremities: Moves  all extremities equally, atraumatic, no clubbing, cyanosis, edema  Skin: warm and dry, no lesions  Psych: normal affect  Neuro: Alert and oriented x 3. Cranial nerves II-XII grossly intact    Urinalysis  Negative for blood, LE, nitrite    PSA    11/26/18: 4.1  11/26/19: 6.4  5/20/20: 4.3  6/3/21: 6.6  7/7/21: 5.2  4/5/22: 6.6  5/23/22: 7.4  12/7/22: 10.7      Assessment:   1. Renal stone  US Retroperitoneal Complete    X-Ray Abdomen AP 1 View      2. UPJ obstruction, acquired        3. Elevated PSA  Prostate Specific Antigen, Diagnostic    Prostate Specific Antigen, Diagnostic      4. Calculus of gallbladder without cholecystitis without obstruction                Plan:  Renal stone  -     US Retroperitoneal Complete; Future; Expected date: 06/07/2023  -     X-Ray Abdomen AP 1 View; Future; Expected date: 06/07/2023    UPJ obstruction, acquired    Elevated PSA  -     Prostate Specific Antigen, Diagnostic; Future; Expected date: 12/07/2022  -     Prostate Specific Antigen, Diagnostic; Future; Expected date: 06/07/2023    Calculus of gallbladder without cholecystitis without obstruction    Pt not symptomatic from cholelithiasis, but discussed symptoms to be aware of. Pt would like to observe.   Pt would like to continue to observe renal stones. Discussed treatment options.       Follow up in about 6 months (around 6/7/2023) for labs before visit, KUB before visit.    12/13/22- called pt to discuss his PSA results. No answer. Message left.

## 2022-12-14 ENCOUNTER — TELEPHONE (OUTPATIENT)
Dept: UROLOGY | Facility: CLINIC | Age: 72
End: 2022-12-14
Payer: MEDICARE

## 2022-12-14 DIAGNOSIS — R97.20 ELEVATED PSA: Primary | ICD-10-CM

## 2022-12-14 NOTE — TELEPHONE ENCOUNTER
Called pt again regarding his PSA. Discussed rise in PSA. Will check another PSA in 3 months and if remains elevated to this level, will recheck MRI.

## 2022-12-14 NOTE — TELEPHONE ENCOUNTER
----- Message from Peterson Weinberg LPN sent at 12/14/2022 11:09 AM CST -----  Contact: Rahul    ----- Message -----  From: Maribel Ridley  Sent: 12/13/2022  11:19 AM CST  To: Rufino COLLINS Staff    Type:  Patient Returning Call    Who Called:Rahul  Who Left Message for Patient: Nurse  Does the patient know what this is regarding?: Test results from 12/7/22  Would the patient rather a call back or a response via [facility]? Call  Best Call Back Number: [phone]  Additional Information:

## 2023-01-04 ENCOUNTER — TELEPHONE (OUTPATIENT)
Dept: INTERNAL MEDICINE | Facility: CLINIC | Age: 73
End: 2023-01-04

## 2023-01-04 ENCOUNTER — OFFICE VISIT (OUTPATIENT)
Dept: INTERNAL MEDICINE | Facility: CLINIC | Age: 73
End: 2023-01-04
Payer: MEDICARE

## 2023-01-04 VITALS
SYSTOLIC BLOOD PRESSURE: 118 MMHG | HEART RATE: 64 BPM | TEMPERATURE: 98 F | WEIGHT: 177.25 LBS | OXYGEN SATURATION: 98 % | BODY MASS INDEX: 26.95 KG/M2 | DIASTOLIC BLOOD PRESSURE: 80 MMHG

## 2023-01-04 DIAGNOSIS — I10 ESSENTIAL HYPERTENSION: ICD-10-CM

## 2023-01-04 DIAGNOSIS — Z01.818 PRE-OP EXAM: Primary | ICD-10-CM

## 2023-01-04 PROCEDURE — 1160F RVW MEDS BY RX/DR IN RCRD: CPT | Mod: CPTII,S$GLB,, | Performed by: NURSE PRACTITIONER

## 2023-01-04 PROCEDURE — 4010F PR ACE/ARB THEARPY RXD/TAKEN: ICD-10-PCS | Mod: CPTII,S$GLB,, | Performed by: NURSE PRACTITIONER

## 2023-01-04 PROCEDURE — 4010F ACE/ARB THERAPY RXD/TAKEN: CPT | Mod: CPTII,S$GLB,, | Performed by: NURSE PRACTITIONER

## 2023-01-04 PROCEDURE — 3288F PR FALLS RISK ASSESSMENT DOCUMENTED: ICD-10-PCS | Mod: CPTII,S$GLB,, | Performed by: NURSE PRACTITIONER

## 2023-01-04 PROCEDURE — 1160F PR REVIEW ALL MEDS BY PRESCRIBER/CLIN PHARMACIST DOCUMENTED: ICD-10-PCS | Mod: CPTII,S$GLB,, | Performed by: NURSE PRACTITIONER

## 2023-01-04 PROCEDURE — 3079F DIAST BP 80-89 MM HG: CPT | Mod: CPTII,S$GLB,, | Performed by: NURSE PRACTITIONER

## 2023-01-04 PROCEDURE — 1100F PR PT FALLS ASSESS DOC 2+ FALLS/FALL W/INJURY/YR: ICD-10-PCS | Mod: CPTII,S$GLB,, | Performed by: NURSE PRACTITIONER

## 2023-01-04 PROCEDURE — 99999 PR PBB SHADOW E&M-EST. PATIENT-LVL III: CPT | Mod: PBBFAC,,, | Performed by: NURSE PRACTITIONER

## 2023-01-04 PROCEDURE — 3074F PR MOST RECENT SYSTOLIC BLOOD PRESSURE < 130 MM HG: ICD-10-PCS | Mod: CPTII,S$GLB,, | Performed by: NURSE PRACTITIONER

## 2023-01-04 PROCEDURE — 99214 PR OFFICE/OUTPT VISIT, EST, LEVL IV, 30-39 MIN: ICD-10-PCS | Mod: S$GLB,,, | Performed by: NURSE PRACTITIONER

## 2023-01-04 PROCEDURE — 3288F FALL RISK ASSESSMENT DOCD: CPT | Mod: CPTII,S$GLB,, | Performed by: NURSE PRACTITIONER

## 2023-01-04 PROCEDURE — 3079F PR MOST RECENT DIASTOLIC BLOOD PRESSURE 80-89 MM HG: ICD-10-PCS | Mod: CPTII,S$GLB,, | Performed by: NURSE PRACTITIONER

## 2023-01-04 PROCEDURE — 99214 OFFICE O/P EST MOD 30 MIN: CPT | Mod: S$GLB,,, | Performed by: NURSE PRACTITIONER

## 2023-01-04 PROCEDURE — 1100F PTFALLS ASSESS-DOCD GE2>/YR: CPT | Mod: CPTII,S$GLB,, | Performed by: NURSE PRACTITIONER

## 2023-01-04 PROCEDURE — 1159F PR MEDICATION LIST DOCUMENTED IN MEDICAL RECORD: ICD-10-PCS | Mod: CPTII,S$GLB,, | Performed by: NURSE PRACTITIONER

## 2023-01-04 PROCEDURE — 99999 PR PBB SHADOW E&M-EST. PATIENT-LVL III: ICD-10-PCS | Mod: PBBFAC,,, | Performed by: NURSE PRACTITIONER

## 2023-01-04 PROCEDURE — 3008F PR BODY MASS INDEX (BMI) DOCUMENTED: ICD-10-PCS | Mod: CPTII,S$GLB,, | Performed by: NURSE PRACTITIONER

## 2023-01-04 PROCEDURE — 1159F MED LIST DOCD IN RCRD: CPT | Mod: CPTII,S$GLB,, | Performed by: NURSE PRACTITIONER

## 2023-01-04 PROCEDURE — 3008F BODY MASS INDEX DOCD: CPT | Mod: CPTII,S$GLB,, | Performed by: NURSE PRACTITIONER

## 2023-01-04 PROCEDURE — 3074F SYST BP LT 130 MM HG: CPT | Mod: CPTII,S$GLB,, | Performed by: NURSE PRACTITIONER

## 2023-01-04 NOTE — TELEPHONE ENCOUNTER
----- Message from Erika Pinto sent at 1/4/2023  3:12 PM CST -----  Contact: Angelina Alfaro from Dr. Cecil Pearce office is requesting a callback or fax of the patient surgery clearance before 5:00 pm today please.    The patient surgery is scheduled for 01.05.2023.      Please call at Angelina at [phone]. Fax [phone]    Thanks

## 2023-01-04 NOTE — PROGRESS NOTES
Subjective:     Rahul Yip is a 72 y.o. male who presents to the office today for a preoperative consultation at the request of surgeon Dr. Pearce who plans on performing right distal radius fx ORIF on January 5. This consultation is requested for the specific conditions prompting preoperative evaluation (i.e. because of potential affect on operative risk). Planned anesthesia: general. The patient has the following known anesthesia issues: past general anesthesia with complications (none). Patients bleeding risk: no recent abnormal bleeding.     The following portions of the patient's history were reviewed and updated as appropriate:  Past Medical History:   Diagnosis Date    Anticoagulant long-term use     Basal cell carcinoma 06/17/2020    left upper back    Colon polyp     Coronary artery disease     Elevated PSA     HLD (hyperlipidemia)     HTN (hypertension)     Kidney stone 2006    passed spontaneously     Past Medical History:   Diagnosis Date    Anticoagulant long-term use     Basal cell carcinoma 06/17/2020    left upper back    Colon polyp     Coronary artery disease     Elevated PSA     HLD (hyperlipidemia)     HTN (hypertension)     Kidney stone 2006    passed spontaneously     Family History   Problem Relation Age of Onset    Cancer Neg Hx     Diabetes Neg Hx     Heart disease Neg Hx     Prostate cancer Neg Hx      Past Surgical History:   Procedure Laterality Date    COLONOSCOPY N/A 3/28/2016    Procedure: COLONOSCOPY;  Surgeon: Rina Pathak MD;  Location: [facility] ENDO;  Service: Endoscopy;  Laterality: N/A;    COLONOSCOPY N/A 12/8/2021    Procedure: COLONOSCOPY;  Surgeon: Dewey Galan MD;  Location: [facility] ENDO;  Service: Endoscopy;  Laterality: N/A;     Social History     Socioeconomic History    Marital status:     Number of children: 3   Tobacco Use    Smoking status: Never    Smokeless tobacco: Never   Substance and Sexual Activity    Alcohol use: Yes     Comment: none 72  hours prior to procedure    Drug use: No    Sexual activity: Yes     Review of patient's allergies indicates:  No Known Allergies  Medication List with Changes/Refills   Current Medications    ASPIRIN (ECOTRIN) 81 MG EC TABLET    Take 81 mg by mouth once daily.    ATORVASTATIN (LIPITOR) 40 MG TABLET    TAKE 1/2 TABLET BY MOUTH ONCE A DAY    DOCUSATE SODIUM (COLACE) 100 MG CAPSULE    Take 1 capsule (100 mg total) by mouth 2 (two) times daily.    GEMFIBROZIL (LOPID) 600 MG TABLET    TAKE 1 TABLET BY MOUTH TWICE DAILY BEFORE MEALS    LISINOPRIL 10 MG TABLET    Take 1 tablet by mouth ONCE A DAY     Patient Active Problem List   Diagnosis    Essential hypertension    Hyperlipidemia    Colon polyps    UPJ obstruction, acquired    Non-functioning kidney    Calculus of gallbladder without cholecystitis without obstruction       Review of Systems  Review of Systems   Constitutional:  Negative for activity change, appetite change and fever.   HENT:  Negative for congestion, ear pain, rhinorrhea and sinus pain.    Respiratory:  Negative for cough and shortness of breath.    Cardiovascular:  Negative for chest pain and leg swelling.   Gastrointestinal:  Negative for abdominal distention, abdominal pain, constipation and diarrhea.   Genitourinary:  Negative for difficulty urinating.   Musculoskeletal:  Positive for joint swelling. Negative for arthralgias and back pain.   Skin:  Negative for rash and wound.   Neurological:  Negative for dizziness, weakness and headaches.   Hematological:  Does not bruise/bleed easily.   Psychiatric/Behavioral:  Negative for agitation, behavioral problems and dysphoric mood.       Objective:     Vitals:    01/04/23 1142   BP: 118/80   Pulse: 64   Temp: 98.2 °F (36.8 °C)       Physical Exam  Vitals reviewed.   Constitutional:       General: He is not in acute distress.     Appearance: Normal appearance. He is normal weight.   HENT:      Head: Normocephalic.      Right Ear: Tympanic membrane  normal.      Left Ear: Tympanic membrane normal.      Nose: Nose normal.   Eyes:      Conjunctiva/sclera: Conjunctivae normal.      Pupils: Pupils are equal, round, and reactive to light.   Cardiovascular:      Rate and Rhythm: Normal rate.      Pulses: Normal pulses.   Pulmonary:      Effort: Pulmonary effort is normal.   Abdominal:      General: Abdomen is flat.      Palpations: Abdomen is soft.   Musculoskeletal:      Right wrist: Swelling present. Decreased range of motion.      Cervical back: Normal range of motion.   Skin:     General: Skin is warm.      Capillary Refill: Capillary refill takes less than 2 seconds.   Neurological:      Mental Status: He is alert and oriented to person, place, and time. Mental status is at baseline.   Psychiatric:         Mood and Affect: Mood normal.        Assessment:       Encounter Diagnoses   Name Primary?    Pre-op exam Yes    Essential hypertension         Plan:     Pre-op exam    Essential hypertension        - Chronic, stable on current meds. Continue       I reviewed the patient's past medical, surgical, social and family history and with  physical exam findings and the proposed surgery and I make the following recommendations:     From a cardiac standpoint the patient is low risk for surgery with a low risk surgery. Patient has no evidence of cardiac symptomatology or cardiac diagnoses. The patient may proceed with surgery without further cardiac workup.     From a pulmonary standpoint the patient presents as a good candidate as well. The patient has no history of lung disease or pulmonary symptoms. Good pulmonary toilet, incentive spirometry, early ambulation are all recommended to improve the pulmonary outcome. No further pulmonary workup as noted prior to surgery.     DVT prophylaxis should be per standard. Venous compression devices are recommended. Early ambulation. Patient has been educated on signs and symptoms of both DVT and pulmonary embolus and instructed  on what to do if there are symptoms postop.     The patient has been instructed to take blood pressure medication the morning of surgery with a sip of water. Avoid any aspirin or anti-inflammatories between now and surgery.     If there is any further I can do to assist in the care of this patient please not hesitate to contact me. I will forward the lab results upon my receipt.    Bridger Cortez NP

## 2023-01-04 NOTE — PROGRESS NOTES
Subjective:       Patient ID: Rahul FOSTER Parent is a 72 y.o. male.    Chief Complaint: Pre-op Exam    HPI    /80   Pulse 64   Temp 98.2 °F (36.8 °C)   Wt 80.4 kg (177 lb 4 oz)   SpO2 98%   BMI 26.95 kg/m²     Review of Systems    Objective:      Physical Exam    Assessment:       No diagnosis found.    Plan:       There are no diagnoses linked to this encounter.  There are no Patient Instructions on file for this visit.

## 2023-03-07 ENCOUNTER — LAB VISIT (OUTPATIENT)
Dept: LAB | Facility: HOSPITAL | Age: 73
End: 2023-03-07
Attending: UROLOGY
Payer: MEDICARE

## 2023-03-07 DIAGNOSIS — R97.20 ELEVATED PSA: ICD-10-CM

## 2023-03-07 LAB — COMPLEXED PSA SERPL-MCNC: 9.6 NG/ML (ref 0–4)

## 2023-03-07 PROCEDURE — 36415 COLL VENOUS BLD VENIPUNCTURE: CPT | Mod: PN | Performed by: UROLOGY

## 2023-03-07 PROCEDURE — 84153 ASSAY OF PSA TOTAL: CPT | Performed by: UROLOGY

## 2023-03-14 ENCOUNTER — LAB VISIT (OUTPATIENT)
Dept: LAB | Facility: HOSPITAL | Age: 73
End: 2023-03-14
Attending: UROLOGY
Payer: MEDICARE

## 2023-03-14 ENCOUNTER — OFFICE VISIT (OUTPATIENT)
Dept: UROLOGY | Facility: CLINIC | Age: 73
End: 2023-03-14
Payer: MEDICARE

## 2023-03-14 VITALS
RESPIRATION RATE: 18 BRPM | HEIGHT: 68 IN | HEART RATE: 91 BPM | BODY MASS INDEX: 27.54 KG/M2 | WEIGHT: 181.69 LBS | DIASTOLIC BLOOD PRESSURE: 75 MMHG | SYSTOLIC BLOOD PRESSURE: 109 MMHG | TEMPERATURE: 98 F

## 2023-03-14 DIAGNOSIS — N13.8 BPH WITH OBSTRUCTION/LOWER URINARY TRACT SYMPTOMS: ICD-10-CM

## 2023-03-14 DIAGNOSIS — R97.20 ELEVATED PSA: Primary | ICD-10-CM

## 2023-03-14 DIAGNOSIS — R97.20 ELEVATED PSA: ICD-10-CM

## 2023-03-14 DIAGNOSIS — N40.1 BPH WITH OBSTRUCTION/LOWER URINARY TRACT SYMPTOMS: ICD-10-CM

## 2023-03-14 LAB
CREAT SERPL-MCNC: 1.1 MG/DL (ref 0.5–1.4)
EST. GFR  (NO RACE VARIABLE): >60 ML/MIN/1.73 M^2

## 2023-03-14 PROCEDURE — 1159F PR MEDICATION LIST DOCUMENTED IN MEDICAL RECORD: ICD-10-PCS | Mod: CPTII,S$GLB,, | Performed by: UROLOGY

## 2023-03-14 PROCEDURE — 1100F PTFALLS ASSESS-DOCD GE2>/YR: CPT | Mod: CPTII,S$GLB,, | Performed by: UROLOGY

## 2023-03-14 PROCEDURE — 99213 OFFICE O/P EST LOW 20 MIN: CPT | Mod: S$GLB,,, | Performed by: UROLOGY

## 2023-03-14 PROCEDURE — 3288F FALL RISK ASSESSMENT DOCD: CPT | Mod: CPTII,S$GLB,, | Performed by: UROLOGY

## 2023-03-14 PROCEDURE — 4010F PR ACE/ARB THEARPY RXD/TAKEN: ICD-10-PCS | Mod: CPTII,S$GLB,, | Performed by: UROLOGY

## 2023-03-14 PROCEDURE — 82565 ASSAY OF CREATININE: CPT | Performed by: UROLOGY

## 2023-03-14 PROCEDURE — 3008F PR BODY MASS INDEX (BMI) DOCUMENTED: ICD-10-PCS | Mod: CPTII,S$GLB,, | Performed by: UROLOGY

## 2023-03-14 PROCEDURE — 3074F PR MOST RECENT SYSTOLIC BLOOD PRESSURE < 130 MM HG: ICD-10-PCS | Mod: CPTII,S$GLB,, | Performed by: UROLOGY

## 2023-03-14 PROCEDURE — 3078F DIAST BP <80 MM HG: CPT | Mod: CPTII,S$GLB,, | Performed by: UROLOGY

## 2023-03-14 PROCEDURE — 3288F PR FALLS RISK ASSESSMENT DOCUMENTED: ICD-10-PCS | Mod: CPTII,S$GLB,, | Performed by: UROLOGY

## 2023-03-14 PROCEDURE — 99213 PR OFFICE/OUTPT VISIT, EST, LEVL III, 20-29 MIN: ICD-10-PCS | Mod: S$GLB,,, | Performed by: UROLOGY

## 2023-03-14 PROCEDURE — 99999 PR PBB SHADOW E&M-EST. PATIENT-LVL III: ICD-10-PCS | Mod: PBBFAC,,, | Performed by: UROLOGY

## 2023-03-14 PROCEDURE — 1160F RVW MEDS BY RX/DR IN RCRD: CPT | Mod: CPTII,S$GLB,, | Performed by: UROLOGY

## 2023-03-14 PROCEDURE — 36415 COLL VENOUS BLD VENIPUNCTURE: CPT | Mod: PN | Performed by: UROLOGY

## 2023-03-14 PROCEDURE — 1159F MED LIST DOCD IN RCRD: CPT | Mod: CPTII,S$GLB,, | Performed by: UROLOGY

## 2023-03-14 PROCEDURE — 1126F PR PAIN SEVERITY QUANTIFIED, NO PAIN PRESENT: ICD-10-PCS | Mod: CPTII,S$GLB,, | Performed by: UROLOGY

## 2023-03-14 PROCEDURE — 3074F SYST BP LT 130 MM HG: CPT | Mod: CPTII,S$GLB,, | Performed by: UROLOGY

## 2023-03-14 PROCEDURE — 4010F ACE/ARB THERAPY RXD/TAKEN: CPT | Mod: CPTII,S$GLB,, | Performed by: UROLOGY

## 2023-03-14 PROCEDURE — 3008F BODY MASS INDEX DOCD: CPT | Mod: CPTII,S$GLB,, | Performed by: UROLOGY

## 2023-03-14 PROCEDURE — 1100F PR PT FALLS ASSESS DOC 2+ FALLS/FALL W/INJURY/YR: ICD-10-PCS | Mod: CPTII,S$GLB,, | Performed by: UROLOGY

## 2023-03-14 PROCEDURE — 3078F PR MOST RECENT DIASTOLIC BLOOD PRESSURE < 80 MM HG: ICD-10-PCS | Mod: CPTII,S$GLB,, | Performed by: UROLOGY

## 2023-03-14 PROCEDURE — 1126F AMNT PAIN NOTED NONE PRSNT: CPT | Mod: CPTII,S$GLB,, | Performed by: UROLOGY

## 2023-03-14 PROCEDURE — 1160F PR REVIEW ALL MEDS BY PRESCRIBER/CLIN PHARMACIST DOCUMENTED: ICD-10-PCS | Mod: CPTII,S$GLB,, | Performed by: UROLOGY

## 2023-03-14 PROCEDURE — 99999 PR PBB SHADOW E&M-EST. PATIENT-LVL III: CPT | Mod: PBBFAC,,, | Performed by: UROLOGY

## 2023-03-14 NOTE — PROGRESS NOTES
CC: follow up elevated PSA    History of Present Illness:   Rahul FOSTER Parent is a 72 y.o. male here for follow up.   3/14/23-Stream is good. No gross hematuria. No nocturia. No gross hematuria. No urgency.   12/7/22- Renal US personally reviewed, showing stable L hydro. Non-obstructing L renal stones also seen, measuring up to 11mm. KUB personally reviewed, showing numerous gallstones. No recent stone passage. No flank pain. No RUQ pain. Good stream. No gross hematuria.   6/7/22- TRUS biopsy benign. Not much gross hematuria, now resolved. No fever or dysuria. No perineal pain. Minimal rectal bleeding. Denies flank pain or RUQ pain/bloating  5/24/22- Here for TRUS biopsy, 45g prostate. Renal US personally reviewed, stable. Cholelithiasis. Pt denies RUQ pain.   4/5/22-MRI read as PI RADS 2. 47cc gland. Good stream, no stranguria. No gross hematuria. No flank pain. Decided that he wanted to further watch the PSA before deciding on biopsy.   6/3/21-States that he is doing fine. No pain. No recent passage of stones. Hasn't had stone passage in at least 10-15 years. No gross hematuria. Stream is good. No stranguria/hesitancy/nocturia. Not sure if he was aware of gallstones in the past.   KUB 5/2021 personally reviewed, showing 1.3cm LLP renal stone, cholelithiasis  Renal US with stable L hdyro    Per [facility]:  5/25/20: PSA bouncing a bit but still at baseline.  Reviewed history in detail. US last year shows a 1.1 cm left lower pole stone.  11/26/18: Mag3 is normal.  40/60 L/R function with acceptable drainage curves.  Has been at [facility] as [facility] for 10 years.  Reviewed history in detail.  6/14/18: No complaints at all, no flank pain after stent removal.  PSA normal.  3/21/18: Cysto/stent pull today uneventful  3/12/18: Left URS shows no obvious UPJ obstruction, no masses, kidney relatively normal.  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    2/21/18: CT Urogram shows a high inserting ureter and  obvious poor left function.  No way to say when this happened.  1/24/18: CT showed a left hydronephrosis with no obstructing UPJ stone, possibly congenital, possibly changes from stones.  No sig stones now to treat.  1/18/17: 65 yo pt of Dr. Valentino here for annual exam.  Feels well no complaints.  PSA has bumped at times, no biopsies.  Had a kidney stone 10 years ago he passed on his own.  Had other stones before that.    Review of Systems   Constitutional:  Negative for chills and fever.   Respiratory:  Negative for shortness of breath.    Cardiovascular:  Negative for chest pain.   Gastrointestinal:  Negative for abdominal pain.   Genitourinary:  Negative for flank pain and hematuria.   All other systems reviewed and are negative.    Current Outpatient Medications   Medication Sig Dispense Refill    aspirin (ECOTRIN) 81 MG EC tablet Take 81 mg by mouth once daily.      lisinopriL 10 MG tablet Take 1 tablet by mouth ONCE A DAY 90 tablet 3    atorvastatin (LIPITOR) 40 MG tablet TAKE 1/2 TABLET BY MOUTH ONCE A DAY (Patient not taking: Reported on 1/4/2023) 45 tablet 0    docusate sodium (COLACE) 100 MG capsule Take 1 capsule (100 mg total) by mouth 2 (two) times daily. (Patient not taking: Reported on 1/4/2023) 60 capsule 0    gemfibroziL (LOPID) 600 MG tablet TAKE 1 TABLET BY MOUTH TWICE DAILY BEFORE MEALS (Patient not taking: Reported on 1/4/2023) 180 tablet 3     No current facility-administered medications for this visit.       Review of patient's allergies indicates:  No Known Allergies    Past medical, family, and social history reviewed as documented in chart with pertinent positive medical, family, and social history detailed in HPI.    Physical Exam  Vitals:    03/14/23 1409   BP: 109/75   Pulse: 91   Resp: 18   Temp: 98.1 °F (36.7 °C)         General: Well-developed, well-nourished, in no acute distress  HEENT: Normocephalic, atraumatic, extraocular eye movements in tact  Neck: supple, trachea midline, no  cervical or supraclavicular adenopathy  Respirations: Even and unlabored  Abdomen: soft, Non-tender, Non-distended, no palpable masses, no rebound or guarding  Rectal: 6/7/22-40gram prostate without nodules or tenderness. No gross blood.   Extremities: Moves all extremities equally, atraumatic, no clubbing, cyanosis, edema  Skin: warm and dry, no lesions  Psych: normal affect  Neuro: Alert and oriented x 3. Cranial nerves II-XII grossly intact        PSA    11/26/18: 4.1  11/26/19: 6.4  5/20/20: 4.3  6/3/21: 6.6  7/7/21: 5.2  4/5/22: 6.6  5/23/22: 7.4  12/7/22: 10.7  3/7/23: 9.6    Assessment:   1. Elevated PSA  MRI Prostate W W/O Contrast    Creatinine, Serum      2. BPH with obstruction/lower urinary tract symptoms                  Plan:  Elevated PSA  -     MRI Prostate W W/O Contrast; Future; Expected date: 03/14/2023  -     Creatinine, Serum; Future; Expected date: 03/14/2023    BPH with obstruction/lower urinary tract symptoms          Discussed persistently elevated PSA compared to when prior imaging and biopsy were done. Pt in agreement to repeat MRI.     Follow up for MRI results.    \

## 2023-03-23 ENCOUNTER — HOSPITAL ENCOUNTER (OUTPATIENT)
Dept: RADIOLOGY | Facility: HOSPITAL | Age: 73
Discharge: HOME | End: 2023-03-23
Attending: UROLOGY
Payer: MEDICARE

## 2023-03-23 DIAGNOSIS — R97.20 ELEVATED PSA: ICD-10-CM

## 2023-03-23 PROCEDURE — 72197 MRI PELVIS W/O & W/DYE: CPT | Mod: 26,,, | Performed by: RADIOLOGY

## 2023-03-23 PROCEDURE — A9585 GADOBUTROL INJECTION: HCPCS | Mod: PN | Performed by: UROLOGY

## 2023-03-23 PROCEDURE — 25500020 PHARM REV CODE 255: Mod: PN | Performed by: UROLOGY

## 2023-03-23 PROCEDURE — 72197 MRI PROSTATE W W/O CONTRAST: ICD-10-PCS | Mod: 26,,, | Performed by: RADIOLOGY

## 2023-03-23 PROCEDURE — 72197 MRI PELVIS W/O & W/DYE: CPT | Mod: TC,PN

## 2023-03-23 RX ORDER — GADOBUTROL 604.72 MG/ML
8 INJECTION INTRAVENOUS
Status: COMPLETED | OUTPATIENT
Start: 2023-03-23 | End: 2023-03-23

## 2023-03-23 RX ADMIN — GADOBUTROL 8 ML: 604.72 INJECTION INTRAVENOUS at 10:03

## 2023-04-11 ENCOUNTER — TELEPHONE (OUTPATIENT)
Dept: UROLOGY | Facility: CLINIC | Age: 73
End: 2023-04-11
Payer: MEDICARE

## 2023-04-11 NOTE — TELEPHONE ENCOUNTER
Message left for pt to return call       ----- Message from Tasneem Oliveira sent at 4/10/2023 11:46 AM CDT -----  Contact: Rahul  Type:  Patient Returning Call    Who Called:Rahul  Who Left Message for Patient:unknown  Does the patient know what this is regarding?:   Would the patient rather a call back or a response via [facility]? call  Best Call Back Number:[phone]   Additional Information: Patient reports missing a call and request another call back.  Thank you,  GH

## 2023-04-12 ENCOUNTER — OFFICE VISIT (OUTPATIENT)
Dept: UROLOGY | Facility: CLINIC | Age: 73
End: 2023-04-12
Payer: MEDICARE

## 2023-04-12 VITALS — HEIGHT: 68 IN | BODY MASS INDEX: 27.76 KG/M2

## 2023-04-12 DIAGNOSIS — N13.8 BPH WITH OBSTRUCTION/LOWER URINARY TRACT SYMPTOMS: ICD-10-CM

## 2023-04-12 DIAGNOSIS — N40.1 BPH WITH OBSTRUCTION/LOWER URINARY TRACT SYMPTOMS: ICD-10-CM

## 2023-04-12 DIAGNOSIS — R97.20 ELEVATED PSA: Primary | ICD-10-CM

## 2023-04-12 PROCEDURE — 3288F PR FALLS RISK ASSESSMENT DOCUMENTED: ICD-10-PCS | Mod: CPTII,S$GLB,, | Performed by: UROLOGY

## 2023-04-12 PROCEDURE — 99214 OFFICE O/P EST MOD 30 MIN: CPT | Mod: S$GLB,,, | Performed by: UROLOGY

## 2023-04-12 PROCEDURE — 99214 PR OFFICE/OUTPT VISIT, EST, LEVL IV, 30-39 MIN: ICD-10-PCS | Mod: S$GLB,,, | Performed by: UROLOGY

## 2023-04-12 PROCEDURE — 1160F PR REVIEW ALL MEDS BY PRESCRIBER/CLIN PHARMACIST DOCUMENTED: ICD-10-PCS | Mod: CPTII,S$GLB,, | Performed by: UROLOGY

## 2023-04-12 PROCEDURE — 1160F RVW MEDS BY RX/DR IN RCRD: CPT | Mod: CPTII,S$GLB,, | Performed by: UROLOGY

## 2023-04-12 PROCEDURE — 1159F PR MEDICATION LIST DOCUMENTED IN MEDICAL RECORD: ICD-10-PCS | Mod: CPTII,S$GLB,, | Performed by: UROLOGY

## 2023-04-12 PROCEDURE — 1101F PR PT FALLS ASSESS DOC 0-1 FALLS W/OUT INJ PAST YR: ICD-10-PCS | Mod: CPTII,S$GLB,, | Performed by: UROLOGY

## 2023-04-12 PROCEDURE — 4010F ACE/ARB THERAPY RXD/TAKEN: CPT | Mod: CPTII,S$GLB,, | Performed by: UROLOGY

## 2023-04-12 PROCEDURE — 99999 PR PBB SHADOW E&M-EST. PATIENT-LVL II: CPT | Mod: PBBFAC,,, | Performed by: UROLOGY

## 2023-04-12 PROCEDURE — 4010F PR ACE/ARB THEARPY RXD/TAKEN: ICD-10-PCS | Mod: CPTII,S$GLB,, | Performed by: UROLOGY

## 2023-04-12 PROCEDURE — 3008F PR BODY MASS INDEX (BMI) DOCUMENTED: ICD-10-PCS | Mod: CPTII,S$GLB,, | Performed by: UROLOGY

## 2023-04-12 PROCEDURE — 1159F MED LIST DOCD IN RCRD: CPT | Mod: CPTII,S$GLB,, | Performed by: UROLOGY

## 2023-04-12 PROCEDURE — 3288F FALL RISK ASSESSMENT DOCD: CPT | Mod: CPTII,S$GLB,, | Performed by: UROLOGY

## 2023-04-12 PROCEDURE — 1101F PT FALLS ASSESS-DOCD LE1/YR: CPT | Mod: CPTII,S$GLB,, | Performed by: UROLOGY

## 2023-04-12 PROCEDURE — 99999 PR PBB SHADOW E&M-EST. PATIENT-LVL II: ICD-10-PCS | Mod: PBBFAC,,, | Performed by: UROLOGY

## 2023-04-12 PROCEDURE — 3008F BODY MASS INDEX DOCD: CPT | Mod: CPTII,S$GLB,, | Performed by: UROLOGY

## 2023-04-12 RX ORDER — LEVOFLOXACIN 500 MG/1
TABLET, FILM COATED ORAL
Qty: 1 TABLET | Refills: 0 | Status: SHIPPED | OUTPATIENT
Start: 2023-04-12 | End: 2023-07-21

## 2023-04-12 NOTE — PROGRESS NOTES
CC: follow up elevated PSA    History of Present Illness:   Rahul FOSTER Parent is a 72 y.o. male here for follow up.     4/12/23- MRI prostate read as PI RADS 4 with lesion in the right posterior medial peripheral zone. I have personally reviewed these images. No new complaints. No gross hematuria, urgency, frequency or stranguria.     3/14/23-Stream is good. No gross hematuria. No nocturia. No gross hematuria. No urgency.   12/7/22- Renal US personally reviewed, showing stable L hydro. Non-obstructing L renal stones also seen, measuring up to 11mm. KUB personally reviewed, showing numerous gallstones. No recent stone passage. No flank pain. No RUQ pain. Good stream. No gross hematuria.   6/7/22- TRUS biopsy benign. Not much gross hematuria, now resolved. No fever or dysuria. No perineal pain. Minimal rectal bleeding. Denies flank pain or RUQ pain/bloating  5/24/22- Here for TRUS biopsy, 45g prostate. Renal US personally reviewed, stable. Cholelithiasis. Pt denies RUQ pain.   4/5/22-MRI read as PI RADS 2. 47cc gland. Good stream, no stranguria. No gross hematuria. No flank pain. Decided that he wanted to further watch the PSA before deciding on biopsy.   6/3/21-States that he is doing fine. No pain. No recent passage of stones. Hasn't had stone passage in at least 10-15 years. No gross hematuria. Stream is good. No stranguria/hesitancy/nocturia. Not sure if he was aware of gallstones in the past.   KUB 5/2021 personally reviewed, showing 1.3cm LLP renal stone, cholelithiasis  Renal US with stable L hdyro    Per [facility]:  5/25/20: PSA bouncing a bit but still at baseline.  Reviewed history in detail. US last year shows a 1.1 cm left lower pole stone.  11/26/18: Mag3 is normal.  40/60 L/R function with acceptable drainage curves.  Has been at [facility] as [facility] for 10 years.  Reviewed history in detail.  6/14/18: No complaints at all, no flank pain after stent removal.  PSA normal.  3/21/18: Cysto/stent pull today  uneventful  3/12/18: Left URS shows no obvious UPJ obstruction, no masses, kidney relatively normal.  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    2/21/18: CT Urogram shows a high inserting ureter and obvious poor left function.  No way to say when this happened.  1/24/18: CT showed a left hydronephrosis with no obstructing UPJ stone, possibly congenital, possibly changes from stones.  No sig stones now to treat.  1/18/17: 67 yo pt of Dr. Valentino here for annual exam.  Feels well no complaints.  PSA has bumped at times, no biopsies.  Had a kidney stone 10 years ago he passed on his own.  Had other stones before that.    Review of Systems   Constitutional:  Negative for chills and fever.   Respiratory:  Negative for shortness of breath.    Cardiovascular:  Negative for chest pain.   Gastrointestinal:  Negative for abdominal pain.   Genitourinary:  Negative for flank pain and hematuria.   All other systems reviewed and are negative.    Current Outpatient Medications   Medication Sig Dispense Refill    aspirin (ECOTRIN) 81 MG EC tablet Take 81 mg by mouth once daily.      atorvastatin (LIPITOR) 40 MG tablet TAKE 1/2 TABLET BY MOUTH ONCE A DAY 45 tablet 2    docusate sodium (COLACE) 100 MG capsule Take 1 capsule (100 mg total) by mouth 2 (two) times daily. 60 capsule 0    gemfibroziL (LOPID) 600 MG tablet TAKE 1 TABLET BY MOUTH TWICE DAILY BEFORE MEALS 180 tablet 3    lisinopriL 10 MG tablet Take 1 tablet by mouth ONCE A DAY 90 tablet 3    levoFLOXacin (LEVAQUIN) 500 MG tablet Take 1 po 1 hour before biopsy 1 tablet 0     No current facility-administered medications for this visit.       Review of patient's allergies indicates:  No Known Allergies    Past medical, family, and social history reviewed as documented in chart with pertinent positive medical, family, and social history detailed in HPI.    Physical Exam  There were no vitals filed for this visit.        General: Well-developed, well-nourished,  in no acute distress  HEENT: Normocephalic, atraumatic, extraocular eye movements in tact  Neck: supple, trachea midline, no cervical or supraclavicular adenopathy  Respirations: Even and unlabored  Abdomen: soft, Non-tender, Non-distended, no palpable masses, no rebound or guarding  Rectal: 6/7/22-40gram prostate without nodules or tenderness. No gross blood.   Extremities: Moves all extremities equally, atraumatic, no clubbing, cyanosis, edema  Skin: warm and dry, no lesions  Psych: normal affect  Neuro: Alert and oriented x 3. Cranial nerves II-XII grossly intact        PSA    11/26/18: 4.1  11/26/19: 6.4  5/20/20: 4.3  6/3/21: 6.6  7/7/21: 5.2  4/5/22: 6.6  5/23/22: 7.4  12/7/22: 10.7  3/7/23: 9.6    Assessment:   1. Elevated PSA        2. BPH with obstruction/lower urinary tract symptoms                    Plan:  Elevated PSA    BPH with obstruction/lower urinary tract symptoms    Other orders  -     levoFLOXacin (LEVAQUIN) 500 MG tablet; Take 1 po 1 hour before biopsy  Dispense: 1 tablet; Refill: 0      I had a detailed discussion with the patient today regarding UroNav biopsy. Risks/benefits were discussed and he has elected to proceed.     Uronav 5/22 12:30pm  Follow up for MRI/US fusion biopsy.

## 2023-05-22 ENCOUNTER — PROCEDURE VISIT (OUTPATIENT)
Dept: UROLOGY | Facility: CLINIC | Age: 73
End: 2023-05-22
Payer: MEDICARE

## 2023-05-22 VITALS
HEART RATE: 64 BPM | DIASTOLIC BLOOD PRESSURE: 81 MMHG | BODY MASS INDEX: 27.67 KG/M2 | HEIGHT: 68 IN | SYSTOLIC BLOOD PRESSURE: 126 MMHG | WEIGHT: 182.56 LBS

## 2023-05-22 DIAGNOSIS — R97.20 ELEVATED PSA: Primary | ICD-10-CM

## 2023-05-22 PROCEDURE — 76872 PR US TRANSRECTAL: ICD-10-PCS | Mod: 26,S$GLB,, | Performed by: UROLOGY

## 2023-05-22 PROCEDURE — 88305 TISSUE EXAM BY PATHOLOGIST: CPT | Mod: 26,,, | Performed by: PATHOLOGY

## 2023-05-22 PROCEDURE — 88305 TISSUE EXAM BY PATHOLOGIST: ICD-10-PCS | Mod: 26,,, | Performed by: PATHOLOGY

## 2023-05-22 PROCEDURE — 88305 TISSUE EXAM BY PATHOLOGIST: CPT | Performed by: PATHOLOGY

## 2023-05-22 PROCEDURE — 96372 THER/PROPH/DIAG INJ SC/IM: CPT | Mod: 59,S$GLB,, | Performed by: UROLOGY

## 2023-05-22 PROCEDURE — 88342 IMHCHEM/IMCYTCHM 1ST ANTB: CPT | Performed by: PATHOLOGY

## 2023-05-22 PROCEDURE — 88341 IMHCHEM/IMCYTCHM EA ADD ANTB: CPT | Mod: 26,,, | Performed by: PATHOLOGY

## 2023-05-22 PROCEDURE — 76872 US TRANSRECTAL: CPT | Mod: 26,S$GLB,, | Performed by: UROLOGY

## 2023-05-22 PROCEDURE — 96372 PR INJECTION,THERAP/PROPH/DIAG2ST, IM OR SUBCUT: ICD-10-PCS | Mod: 59,S$GLB,, | Performed by: UROLOGY

## 2023-05-22 PROCEDURE — 88341 PR IHC OR ICC EACH ADD'L SINGLE ANTIBODY  STAINPR: ICD-10-PCS | Mod: 26,,, | Performed by: PATHOLOGY

## 2023-05-22 PROCEDURE — 55700 PR BIOPSY OF PROSTATE,NEEDLE/PUNCH: CPT | Mod: S$GLB,,, | Performed by: UROLOGY

## 2023-05-22 PROCEDURE — 88342 IMHCHEM/IMCYTCHM 1ST ANTB: CPT | Mod: 26,,, | Performed by: PATHOLOGY

## 2023-05-22 PROCEDURE — 55700 PR BIOPSY OF PROSTATE,NEEDLE/PUNCH: ICD-10-PCS | Mod: S$GLB,,, | Performed by: UROLOGY

## 2023-05-22 PROCEDURE — 88342 CHG IMMUNOCYTOCHEMISTRY: ICD-10-PCS | Mod: 26,,, | Performed by: PATHOLOGY

## 2023-05-22 PROCEDURE — 88341 IMHCHEM/IMCYTCHM EA ADD ANTB: CPT | Performed by: PATHOLOGY

## 2023-05-22 RX ORDER — CEFTRIAXONE 1 G/1
1 INJECTION, POWDER, FOR SOLUTION INTRAMUSCULAR; INTRAVENOUS
Status: COMPLETED | OUTPATIENT
Start: 2023-05-22 | End: 2023-05-22

## 2023-05-22 RX ORDER — LIDOCAINE HYDROCHLORIDE 20 MG/ML
JELLY TOPICAL
Status: DISCONTINUED | OUTPATIENT
Start: 2023-05-22 | End: 2023-11-16

## 2023-05-22 RX ADMIN — CEFTRIAXONE 1 G: 1 INJECTION, POWDER, FOR SOLUTION INTRAMUSCULAR; INTRAVENOUS at 02:05

## 2023-05-22 NOTE — PROCEDURES
Procedure: (1) Transrectal Prostate Biopsy                      (2) Transrectal ultrasound of prostate with MRI fusion of images                     (3) Ultrasound Guidance of Prostate Biopsy needles    Reason for procedure: elevated PSA, abnormal MRI  Lab Results   Component Value Date    PSA 7.4 (H) 05/23/2022    PSA 6.6 (H) 06/03/2021    PSA 4.3 (H) 05/20/2020         Detail: Antibiotic prophylaxis was provided using levaquin and rocephin. After proper consents were obtained, the patient was prepped and draped in normal fashion in the left lateral decubitus position for TRUS/Bx.  5 ml of lidocaine jelly was instilled in the rectum.  Rectal exam noted a smooth prostate. The U/S with rectal probe was into the patient's rectum. A sweep of the prostate was performed from left to right in the sagittal plane, and the MRI images were aligned with the ultrasound images. A spinal needle was used and 10ml of 1% lidocaine was instilled on the side of the prostate at the base of the seminal vesicles. Systematic review was performed of the prostate, noting no ultrasonic lesions. The prostate measured to be 46g. The region of interest was first targeted. Biopsy was then performed using an 18Ga biopsy needle in a standard fashion directed at the base, mid and apex bilaterally for a total of 15 cores. The patient tolerated the procedure well. Estimated blood loss was 3cc.     Rahul was seen today for other.    Diagnoses and all orders for this visit:    Elevated PSA    Other orders  -     cefTRIAXone injection 1 g  -     LIDOcaine HCl 2% urojet        I had a detailed discussion with the patient regarding post-TRUS biopsy fever and need to go to the ED for admission for IV antibiotics for any temperature above 100.4 degrees.     He will follow up in 2 weeks for TRUS biopsy results.

## 2023-05-31 ENCOUNTER — TELEPHONE (OUTPATIENT)
Dept: UROLOGY | Facility: CLINIC | Age: 73
End: 2023-05-31
Payer: MEDICARE

## 2023-05-31 NOTE — TELEPHONE ENCOUNTER
Called pt no answer.  ----- Message from David Fortune sent at 5/31/2023 11:39 AM CDT -----  Rahul is calling in regards to his appointment on 6/6. Please call him back at [phone].    Thank  Cf

## 2023-06-02 LAB
COMMENT: NORMAL
FINAL PATHOLOGIC DIAGNOSIS: NORMAL
GROSS: NORMAL
Lab: NORMAL

## 2023-06-07 ENCOUNTER — HOSPITAL ENCOUNTER (OUTPATIENT)
Dept: RADIOLOGY | Facility: HOSPITAL | Age: 73
Discharge: HOME | End: 2023-06-07
Attending: UROLOGY
Payer: MEDICARE

## 2023-06-07 DIAGNOSIS — N20.0 RENAL STONE: ICD-10-CM

## 2023-06-07 PROCEDURE — 74018 XR ABDOMEN AP 1 VIEW: ICD-10-PCS | Mod: 26,,, | Performed by: STUDENT IN AN ORGANIZED HEALTH CARE EDUCATION/TRAINING PROGRAM

## 2023-06-07 PROCEDURE — 76770 US EXAM ABDO BACK WALL COMP: CPT | Mod: 26,,, | Performed by: STUDENT IN AN ORGANIZED HEALTH CARE EDUCATION/TRAINING PROGRAM

## 2023-06-07 PROCEDURE — 76770 US EXAM ABDO BACK WALL COMP: CPT | Mod: TC,PN

## 2023-06-07 PROCEDURE — 74018 RADEX ABDOMEN 1 VIEW: CPT | Mod: 26,,, | Performed by: STUDENT IN AN ORGANIZED HEALTH CARE EDUCATION/TRAINING PROGRAM

## 2023-06-07 PROCEDURE — 74018 RADEX ABDOMEN 1 VIEW: CPT | Mod: TC,PN

## 2023-06-07 PROCEDURE — 76770 US RETROPERITONEAL COMPLETE: ICD-10-PCS | Mod: 26,,, | Performed by: STUDENT IN AN ORGANIZED HEALTH CARE EDUCATION/TRAINING PROGRAM

## 2023-06-09 ENCOUNTER — OFFICE VISIT (OUTPATIENT)
Dept: UROLOGY | Facility: CLINIC | Age: 73
End: 2023-06-09
Payer: MEDICARE

## 2023-06-09 VITALS
HEIGHT: 68 IN | HEART RATE: 91 BPM | SYSTOLIC BLOOD PRESSURE: 113 MMHG | WEIGHT: 182.56 LBS | BODY MASS INDEX: 27.67 KG/M2 | TEMPERATURE: 98 F | DIASTOLIC BLOOD PRESSURE: 70 MMHG | RESPIRATION RATE: 18 BRPM

## 2023-06-09 DIAGNOSIS — N20.0 RENAL STONE: ICD-10-CM

## 2023-06-09 DIAGNOSIS — C61 PROSTATE CANCER: Primary | ICD-10-CM

## 2023-06-09 DIAGNOSIS — N13.5 UPJ OBSTRUCTION, ACQUIRED: ICD-10-CM

## 2023-06-09 PROCEDURE — 1159F PR MEDICATION LIST DOCUMENTED IN MEDICAL RECORD: ICD-10-PCS | Mod: CPTII,S$GLB,, | Performed by: UROLOGY

## 2023-06-09 PROCEDURE — 3074F SYST BP LT 130 MM HG: CPT | Mod: CPTII,S$GLB,, | Performed by: UROLOGY

## 2023-06-09 PROCEDURE — 1126F PR PAIN SEVERITY QUANTIFIED, NO PAIN PRESENT: ICD-10-PCS | Mod: CPTII,S$GLB,, | Performed by: UROLOGY

## 2023-06-09 PROCEDURE — 1100F PTFALLS ASSESS-DOCD GE2>/YR: CPT | Mod: CPTII,S$GLB,, | Performed by: UROLOGY

## 2023-06-09 PROCEDURE — 3074F PR MOST RECENT SYSTOLIC BLOOD PRESSURE < 130 MM HG: ICD-10-PCS | Mod: CPTII,S$GLB,, | Performed by: UROLOGY

## 2023-06-09 PROCEDURE — 3008F PR BODY MASS INDEX (BMI) DOCUMENTED: ICD-10-PCS | Mod: CPTII,S$GLB,, | Performed by: UROLOGY

## 2023-06-09 PROCEDURE — 4010F PR ACE/ARB THEARPY RXD/TAKEN: ICD-10-PCS | Mod: CPTII,S$GLB,, | Performed by: UROLOGY

## 2023-06-09 PROCEDURE — 3288F PR FALLS RISK ASSESSMENT DOCUMENTED: ICD-10-PCS | Mod: CPTII,S$GLB,, | Performed by: UROLOGY

## 2023-06-09 PROCEDURE — 3078F PR MOST RECENT DIASTOLIC BLOOD PRESSURE < 80 MM HG: ICD-10-PCS | Mod: CPTII,S$GLB,, | Performed by: UROLOGY

## 2023-06-09 PROCEDURE — 1126F AMNT PAIN NOTED NONE PRSNT: CPT | Mod: CPTII,S$GLB,, | Performed by: UROLOGY

## 2023-06-09 PROCEDURE — 3008F BODY MASS INDEX DOCD: CPT | Mod: CPTII,S$GLB,, | Performed by: UROLOGY

## 2023-06-09 PROCEDURE — 3078F DIAST BP <80 MM HG: CPT | Mod: CPTII,S$GLB,, | Performed by: UROLOGY

## 2023-06-09 PROCEDURE — 99999 PR PBB SHADOW E&M-EST. PATIENT-LVL III: ICD-10-PCS | Mod: PBBFAC,,, | Performed by: UROLOGY

## 2023-06-09 PROCEDURE — 99214 OFFICE O/P EST MOD 30 MIN: CPT | Mod: S$GLB,,, | Performed by: UROLOGY

## 2023-06-09 PROCEDURE — 99214 PR OFFICE/OUTPT VISIT, EST, LEVL IV, 30-39 MIN: ICD-10-PCS | Mod: S$GLB,,, | Performed by: UROLOGY

## 2023-06-09 PROCEDURE — 1159F MED LIST DOCD IN RCRD: CPT | Mod: CPTII,S$GLB,, | Performed by: UROLOGY

## 2023-06-09 PROCEDURE — 99999 PR PBB SHADOW E&M-EST. PATIENT-LVL III: CPT | Mod: PBBFAC,,, | Performed by: UROLOGY

## 2023-06-09 PROCEDURE — 4010F ACE/ARB THERAPY RXD/TAKEN: CPT | Mod: CPTII,S$GLB,, | Performed by: UROLOGY

## 2023-06-09 PROCEDURE — 1100F PR PT FALLS ASSESS DOC 2+ FALLS/FALL W/INJURY/YR: ICD-10-PCS | Mod: CPTII,S$GLB,, | Performed by: UROLOGY

## 2023-06-09 PROCEDURE — 3288F FALL RISK ASSESSMENT DOCD: CPT | Mod: CPTII,S$GLB,, | Performed by: UROLOGY

## 2023-06-09 NOTE — PROGRESS NOTES
CC: follow up elevated PSA    History of Present Illness:   Rahul FOSTER Parent is a 72 y.o. male here for follow up.     6/9/23-UroNav path shows [place] 3+3=6 in 1 core, 3% of tissue sampled. Pt did well after biopsy. No fever. No longer having hematuria. KUB also personally reviewed, showing L lower pole renal stones, which appear stable. Renal US shows stable L hydronephrosis.    4/12/23- MRI prostate read as PI RADS 4 with lesion in the right posterior medial peripheral zone. I have personally reviewed these images. No new complaints. No gross hematuria, urgency, frequency or stranguria.     3/14/23-Stream is good. No gross hematuria. No nocturia. No gross hematuria. No urgency.   12/7/22- Renal US personally reviewed, showing stable L hydro. Non-obstructing L renal stones also seen, measuring up to 11mm. KUB personally reviewed, showing numerous gallstones. No recent stone passage. No flank pain. No RUQ pain. Good stream. No gross hematuria.   6/7/22- TRUS biopsy benign. Not much gross hematuria, now resolved. No fever or dysuria. No perineal pain. Minimal rectal bleeding. Denies flank pain or RUQ pain/bloating  5/24/22- Here for TRUS biopsy, 45g prostate. Renal US personally reviewed, stable. Cholelithiasis. Pt denies RUQ pain.   4/5/22-MRI read as PI RADS 2. 47cc gland. Good stream, no stranguria. No gross hematuria. No flank pain. Decided that he wanted to further watch the PSA before deciding on biopsy.   6/3/21-States that he is doing fine. No pain. No recent passage of stones. Hasn't had stone passage in at least 10-15 years. No gross hematuria. Stream is good. No stranguria/hesitancy/nocturia. Not sure if he was aware of gallstones in the past.   KUB 5/2021 personally reviewed, showing 1.3cm LLP renal stone, cholelithiasis  Renal US with stable L hdyro    Per Ernst:  5/25/20: PSA bouncing a bit but still at baseline.  Reviewed history in detail. US last year shows a 1.1 cm left lower pole  stone.  11/26/18: Mag3 is normal.  40/60 L/R function with acceptable drainage curves.  Has been at [facility] as [facility] for 10 years.  Reviewed history in detail.  6/14/18: No complaints at all, no flank pain after stent removal.  PSA normal.  3/21/18: Cysto/stent pull today uneventful  3/12/18: Left URS shows no obvious UPJ obstruction, no masses, kidney relatively normal.  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    2/21/18: CT Urogram shows a high inserting ureter and obvious poor left function.  No way to say when this happened.  1/24/18: CT showed a left hydronephrosis with no obstructing UPJ stone, possibly congenital, possibly changes from stones.  No sig stones now to treat.  1/18/17: 67 yo pt of Dr. Valentino here for annual exam.  Feels well no complaints.  PSA has bumped at times, no biopsies.  Had a kidney stone 10 years ago he passed on his own.  Had other stones before that.    Review of Systems   Constitutional:  Negative for chills and fever.   Respiratory:  Negative for shortness of breath.    Cardiovascular:  Negative for chest pain.   Gastrointestinal:  Negative for abdominal pain.   Genitourinary:  Negative for flank pain and hematuria.   All other systems reviewed and are negative.    Current Outpatient Medications   Medication Sig Dispense Refill    aspirin (ECOTRIN) 81 MG EC tablet Take 81 mg by mouth once daily.      atorvastatin (LIPITOR) 40 MG tablet TAKE 1/2 TABLET BY MOUTH ONCE A DAY 45 tablet 2    docusate sodium (COLACE) 100 MG capsule Take 1 capsule (100 mg total) by mouth 2 (two) times daily. 60 capsule 0    gemfibroziL (LOPID) 600 MG tablet TAKE 1 TABLET BY MOUTH TWICE DAILY BEFORE MEALS 180 tablet 3    levoFLOXacin (LEVAQUIN) 500 MG tablet Take 1 po 1 hour before biopsy 1 tablet 0    lisinopriL 10 MG tablet Take 1 tablet by mouth ONCE A DAY 90 tablet 3     Current Facility-Administered Medications   Medication Dose Route Frequency Provider Last Rate Last Admin     LIDOcaine HCl 2% urojet   Mucous Membrane 1 time in Clinic/HOD Cheri Joy MD           Review of patient's allergies indicates:  No Known Allergies    Past medical, family, and social history reviewed as documented in chart with pertinent positive medical, family, and social history detailed in HPI.    Physical Exam  Vitals:    06/09/23 1113   BP: 113/70   Pulse: 91   Resp: 18   Temp: 97.9 °F (36.6 °C)           General: Well-developed, well-nourished, in no acute distress  HEENT: Normocephalic, atraumatic, extraocular eye movements in tact  Neck: supple, trachea midline, no cervical or supraclavicular adenopathy  Respirations: Even and unlabored  Abdomen: soft, Non-tender, Non-distended, no palpable masses, no rebound or guarding  Rectal: 6/7/22-40gram prostate without nodules or tenderness. No gross blood.   Extremities: Moves all extremities equally, atraumatic, no clubbing, cyanosis, edema  Skin: warm and dry, no lesions  Psych: normal affect  Neuro: Alert and oriented x 3. Cranial nerves II-XII grossly intact        PSA    11/26/18: 4.1  11/26/19: 6.4  5/20/20: 4.3  6/3/21: 6.6  7/7/21: 5.2  4/5/22: 6.6  5/23/22: 7.4  12/7/22: 10.7  3/7/23: 9.6    Assessment:   1. Prostate cancer        2. Renal stone        3. UPJ obstruction, acquired                    Plan:  Prostate cancer    Renal stone    UPJ obstruction, acquired      I had a detailed discussion with the patient today regarding Low grade prostate cancer. Pt is interested in pursuing active surveillance, but we will send his path for the decipher test.     Follow up in about 6 weeks (around 7/21/2023).

## 2023-06-15 ENCOUNTER — TELEPHONE (OUTPATIENT)
Dept: UROLOGY | Facility: CLINIC | Age: 73
End: 2023-06-15
Payer: MEDICARE

## 2023-06-15 NOTE — TELEPHONE ENCOUNTER
YUN Jacob with Decipher testing. Stated he needs the pathology report, and insurance information sent over to him. Will fax to [phone] Ref# [id number]    Meghan SCHUMACHER RN

## 2023-06-15 NOTE — TELEPHONE ENCOUNTER
----- Message from Kami Yeager sent at 6/15/2023 12:38 PM CDT -----  Contact: Cecil/Vendor  Elfego Jacob is calling to speak with the nurse regarding orders that sent over and stated missing information. Please give a call back at [phone] as requested.  Thanks  LR

## 2023-07-21 ENCOUNTER — OFFICE VISIT (OUTPATIENT)
Dept: UROLOGY | Facility: CLINIC | Age: 73
End: 2023-07-21
Payer: MEDICARE

## 2023-07-21 VITALS
DIASTOLIC BLOOD PRESSURE: 76 MMHG | WEIGHT: 180.56 LBS | TEMPERATURE: 97 F | BODY MASS INDEX: 27.36 KG/M2 | HEIGHT: 68 IN | HEART RATE: 86 BPM | RESPIRATION RATE: 18 BRPM | SYSTOLIC BLOOD PRESSURE: 126 MMHG

## 2023-07-21 DIAGNOSIS — C61 PROSTATE CANCER: Primary | ICD-10-CM

## 2023-07-21 LAB — POC RESIDUAL URINE VOLUME: 39 ML (ref 0–100)

## 2023-07-21 PROCEDURE — 3074F PR MOST RECENT SYSTOLIC BLOOD PRESSURE < 130 MM HG: ICD-10-PCS | Mod: CPTII,S$GLB,, | Performed by: UROLOGY

## 2023-07-21 PROCEDURE — 3008F BODY MASS INDEX DOCD: CPT | Mod: CPTII,S$GLB,, | Performed by: UROLOGY

## 2023-07-21 PROCEDURE — 3008F PR BODY MASS INDEX (BMI) DOCUMENTED: ICD-10-PCS | Mod: CPTII,S$GLB,, | Performed by: UROLOGY

## 2023-07-21 PROCEDURE — 99999 PR PBB SHADOW E&M-EST. PATIENT-LVL III: ICD-10-PCS | Mod: PBBFAC,,, | Performed by: UROLOGY

## 2023-07-21 PROCEDURE — 99213 OFFICE O/P EST LOW 20 MIN: CPT | Mod: S$GLB,,, | Performed by: UROLOGY

## 2023-07-21 PROCEDURE — 51798 US URINE CAPACITY MEASURE: CPT | Mod: S$GLB,,, | Performed by: UROLOGY

## 2023-07-21 PROCEDURE — 3288F PR FALLS RISK ASSESSMENT DOCUMENTED: ICD-10-PCS | Mod: CPTII,S$GLB,, | Performed by: UROLOGY

## 2023-07-21 PROCEDURE — 4010F PR ACE/ARB THEARPY RXD/TAKEN: ICD-10-PCS | Mod: CPTII,S$GLB,, | Performed by: UROLOGY

## 2023-07-21 PROCEDURE — 1160F RVW MEDS BY RX/DR IN RCRD: CPT | Mod: CPTII,S$GLB,, | Performed by: UROLOGY

## 2023-07-21 PROCEDURE — 4010F ACE/ARB THERAPY RXD/TAKEN: CPT | Mod: CPTII,S$GLB,, | Performed by: UROLOGY

## 2023-07-21 PROCEDURE — 1126F PR PAIN SEVERITY QUANTIFIED, NO PAIN PRESENT: ICD-10-PCS | Mod: CPTII,S$GLB,, | Performed by: UROLOGY

## 2023-07-21 PROCEDURE — 99999 PR PBB SHADOW E&M-EST. PATIENT-LVL III: CPT | Mod: PBBFAC,,, | Performed by: UROLOGY

## 2023-07-21 PROCEDURE — 1160F PR REVIEW ALL MEDS BY PRESCRIBER/CLIN PHARMACIST DOCUMENTED: ICD-10-PCS | Mod: CPTII,S$GLB,, | Performed by: UROLOGY

## 2023-07-21 PROCEDURE — 1126F AMNT PAIN NOTED NONE PRSNT: CPT | Mod: CPTII,S$GLB,, | Performed by: UROLOGY

## 2023-07-21 PROCEDURE — 1101F PR PT FALLS ASSESS DOC 0-1 FALLS W/OUT INJ PAST YR: ICD-10-PCS | Mod: CPTII,S$GLB,, | Performed by: UROLOGY

## 2023-07-21 PROCEDURE — 1159F PR MEDICATION LIST DOCUMENTED IN MEDICAL RECORD: ICD-10-PCS | Mod: CPTII,S$GLB,, | Performed by: UROLOGY

## 2023-07-21 PROCEDURE — 3288F FALL RISK ASSESSMENT DOCD: CPT | Mod: CPTII,S$GLB,, | Performed by: UROLOGY

## 2023-07-21 PROCEDURE — 1159F MED LIST DOCD IN RCRD: CPT | Mod: CPTII,S$GLB,, | Performed by: UROLOGY

## 2023-07-21 PROCEDURE — 3078F DIAST BP <80 MM HG: CPT | Mod: CPTII,S$GLB,, | Performed by: UROLOGY

## 2023-07-21 PROCEDURE — 3078F PR MOST RECENT DIASTOLIC BLOOD PRESSURE < 80 MM HG: ICD-10-PCS | Mod: CPTII,S$GLB,, | Performed by: UROLOGY

## 2023-07-21 PROCEDURE — 51798 POCT BLADDER SCAN: ICD-10-PCS | Mod: S$GLB,,, | Performed by: UROLOGY

## 2023-07-21 PROCEDURE — 99213 PR OFFICE/OUTPT VISIT, EST, LEVL III, 20-29 MIN: ICD-10-PCS | Mod: S$GLB,,, | Performed by: UROLOGY

## 2023-07-21 PROCEDURE — 1101F PT FALLS ASSESS-DOCD LE1/YR: CPT | Mod: CPTII,S$GLB,, | Performed by: UROLOGY

## 2023-07-21 PROCEDURE — 3074F SYST BP LT 130 MM HG: CPT | Mod: CPTII,S$GLB,, | Performed by: UROLOGY

## 2023-07-21 NOTE — PROGRESS NOTES
CC: follow up prostate cancer    History of Present Illness:   Rahul FOSTER Parent is a 72 y.o. male here for follow up.     7/21/23-decipher test not completed due to not enough tissue. No new complaints. No flank pain. Stream is good.     6/9/23-UroNav path shows [place] 3+3=6 in 1 core, 3% of tissue sampled. Pt did well after biopsy. No fever. No longer having hematuria. KUB also personally reviewed, showing L lower pole renal stones, which appear stable. Renal US shows stable L hydronephrosis.    4/12/23- MRI prostate read as PI RADS 4 with lesion in the right posterior medial peripheral zone. I have personally reviewed these images. No new complaints. No gross hematuria, urgency, frequency or stranguria.     3/14/23-Stream is good. No gross hematuria. No nocturia. No gross hematuria. No urgency.   12/7/22- Renal US personally reviewed, showing stable L hydro. Non-obstructing L renal stones also seen, measuring up to 11mm. KUB personally reviewed, showing numerous gallstones. No recent stone passage. No flank pain. No RUQ pain. Good stream. No gross hematuria.   6/7/22- TRUS biopsy benign. Not much gross hematuria, now resolved. No fever or dysuria. No perineal pain. Minimal rectal bleeding. Denies flank pain or RUQ pain/bloating  5/24/22- Here for TRUS biopsy, 45g prostate. Renal US personally reviewed, stable. Cholelithiasis. Pt denies RUQ pain.   4/5/22-MRI read as PI RADS 2. 47cc gland. Good stream, no stranguria. No gross hematuria. No flank pain. Decided that he wanted to further watch the PSA before deciding on biopsy.   6/3/21-States that he is doing fine. No pain. No recent passage of stones. Hasn't had stone passage in at least 10-15 years. No gross hematuria. Stream is good. No stranguria/hesitancy/nocturia. Not sure if he was aware of gallstones in the past.   KUB 5/2021 personally reviewed, showing 1.3cm LLP renal stone, cholelithiasis  Renal US with stable L hdyro    Per [facility]:  5/25/20: PSA  bouncing a bit but still at baseline.  Reviewed history in detail. US last year shows a 1.1 cm left lower pole stone.  11/26/18: Mag3 is normal.  40/60 L/R function with acceptable drainage curves.  Has been at [facility] as [facility] for 10 years.  Reviewed history in detail.  6/14/18: No complaints at all, no flank pain after stent removal.  PSA normal.  3/21/18: Cysto/stent pull today uneventful  3/12/18: Left URS shows no obvious UPJ obstruction, no masses, kidney relatively normal.  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    2/21/18: CT Urogram shows a high inserting ureter and obvious poor left function.  No way to say when this happened.  1/24/18: CT showed a left hydronephrosis with no obstructing UPJ stone, possibly congenital, possibly changes from stones.  No sig stones now to treat.  1/18/17: 67 yo pt of Dr. Valentino here for annual exam.  Feels well no complaints.  PSA has bumped at times, no biopsies.  Had a kidney stone 10 years ago he passed on his own.  Had other stones before that.    Review of Systems   Constitutional:  Negative for chills and fever.   Respiratory:  Negative for shortness of breath.    Cardiovascular:  Negative for chest pain.   Gastrointestinal:  Negative for abdominal pain.   Genitourinary:  Negative for flank pain and hematuria.   All other systems reviewed and are negative.    Current Outpatient Medications   Medication Sig Dispense Refill    aspirin (ECOTRIN) 81 MG EC tablet Take 81 mg by mouth once daily.      atorvastatin (LIPITOR) 40 MG tablet TAKE 1/2 TABLET BY MOUTH ONCE A DAY 45 tablet 2    docusate sodium (COLACE) 100 MG capsule Take 1 capsule (100 mg total) by mouth 2 (two) times daily. 60 capsule 0    gemfibroziL (LOPID) 600 MG tablet TAKE 1 TABLET BY MOUTH TWICE DAILY BEFORE MEALS 180 tablet 3    lisinopriL 10 MG tablet Take 1 tablet by mouth ONCE A DAY 90 tablet 3     Current Facility-Administered Medications   Medication Dose Route Frequency  Provider Last Rate Last Admin    LIDOcaine HCl 2% urojet   Mucous Membrane 1 time in Clinic/HOD Cheri Joy MD           Review of patient's allergies indicates:  No Known Allergies    Past medical, family, and social history reviewed as documented in chart with pertinent positive medical, family, and social history detailed in HPI.    Physical Exam  Vitals:    07/21/23 1136   BP: 126/76   Pulse: 86   Resp: 18   Temp: 97.4 °F (36.3 °C)             General: Well-developed, well-nourished, in no acute distress  HEENT: Normocephalic, atraumatic, extraocular eye movements in tact  Neck: supple, trachea midline, no cervical or supraclavicular adenopathy  Respirations: Even and unlabored  Rectal: 6/7/22-40gram prostate without nodules or tenderness. No gross blood.   Extremities: Moves all extremities equally, atraumatic, no clubbing, cyanosis, edema  Skin: warm and dry, no lesions  Psych: normal affect  Neuro: Alert and oriented x 3. Cranial nerves II-XII grossly intact        PSA    11/26/18: 4.1  11/26/19: 6.4  5/20/20: 4.3  6/3/21: 6.6  7/7/21: 5.2  4/5/22: 6.6  5/23/22: 7.4  12/7/22: 10.7  3/7/23: 9.6    Assessment:   1. Prostate cancer  Prostate Specific Antigen, Diagnostic                    Plan:  Prostate cancer  -     Prostate Specific Antigen, Diagnostic; Future; Expected date: 10/21/2023      Discussed RALP, XRT and AS. Discussed AS protocol. Pt would like to proceed with AS.         Follow up in about 3 months (around 10/21/2023) for labs before visit.

## 2023-10-20 ENCOUNTER — LAB VISIT (OUTPATIENT)
Dept: LAB | Facility: HOSPITAL | Age: 73
End: 2023-10-20
Attending: UROLOGY
Payer: MEDICARE

## 2023-10-20 DIAGNOSIS — C61 PROSTATE CANCER: ICD-10-CM

## 2023-10-20 LAB — COMPLEXED PSA SERPL-MCNC: 13.7 NG/ML (ref 0–4)

## 2023-10-20 PROCEDURE — 84153 ASSAY OF PSA TOTAL: CPT | Performed by: UROLOGY

## 2023-10-20 PROCEDURE — 36415 COLL VENOUS BLD VENIPUNCTURE: CPT | Mod: PN | Performed by: UROLOGY

## 2023-10-24 ENCOUNTER — OFFICE VISIT (OUTPATIENT)
Dept: UROLOGY | Facility: CLINIC | Age: 73
End: 2023-10-24
Payer: MEDICARE

## 2023-10-24 VITALS
HEART RATE: 80 BPM | WEIGHT: 177.5 LBS | DIASTOLIC BLOOD PRESSURE: 86 MMHG | BODY MASS INDEX: 26.98 KG/M2 | SYSTOLIC BLOOD PRESSURE: 137 MMHG

## 2023-10-24 DIAGNOSIS — C61 PROSTATE CANCER: Primary | ICD-10-CM

## 2023-10-24 PROCEDURE — 3288F FALL RISK ASSESSMENT DOCD: CPT | Mod: CPTII,S$GLB,, | Performed by: UROLOGY

## 2023-10-24 PROCEDURE — 99214 PR OFFICE/OUTPT VISIT, EST, LEVL IV, 30-39 MIN: ICD-10-PCS | Mod: S$GLB,,, | Performed by: UROLOGY

## 2023-10-24 PROCEDURE — 1159F PR MEDICATION LIST DOCUMENTED IN MEDICAL RECORD: ICD-10-PCS | Mod: CPTII,S$GLB,, | Performed by: UROLOGY

## 2023-10-24 PROCEDURE — 1126F PR PAIN SEVERITY QUANTIFIED, NO PAIN PRESENT: ICD-10-PCS | Mod: CPTII,S$GLB,, | Performed by: UROLOGY

## 2023-10-24 PROCEDURE — 3075F SYST BP GE 130 - 139MM HG: CPT | Mod: CPTII,S$GLB,, | Performed by: UROLOGY

## 2023-10-24 PROCEDURE — 99999 PR PBB SHADOW E&M-EST. PATIENT-LVL III: CPT | Mod: PBBFAC,,, | Performed by: UROLOGY

## 2023-10-24 PROCEDURE — 99214 OFFICE O/P EST MOD 30 MIN: CPT | Mod: S$GLB,,, | Performed by: UROLOGY

## 2023-10-24 PROCEDURE — 3288F PR FALLS RISK ASSESSMENT DOCUMENTED: ICD-10-PCS | Mod: CPTII,S$GLB,, | Performed by: UROLOGY

## 2023-10-24 PROCEDURE — 3008F PR BODY MASS INDEX (BMI) DOCUMENTED: ICD-10-PCS | Mod: CPTII,S$GLB,, | Performed by: UROLOGY

## 2023-10-24 PROCEDURE — 1100F PTFALLS ASSESS-DOCD GE2>/YR: CPT | Mod: CPTII,S$GLB,, | Performed by: UROLOGY

## 2023-10-24 PROCEDURE — 1159F MED LIST DOCD IN RCRD: CPT | Mod: CPTII,S$GLB,, | Performed by: UROLOGY

## 2023-10-24 PROCEDURE — 4010F ACE/ARB THERAPY RXD/TAKEN: CPT | Mod: CPTII,S$GLB,, | Performed by: UROLOGY

## 2023-10-24 PROCEDURE — 1100F PR PT FALLS ASSESS DOC 2+ FALLS/FALL W/INJURY/YR: ICD-10-PCS | Mod: CPTII,S$GLB,, | Performed by: UROLOGY

## 2023-10-24 PROCEDURE — 4010F PR ACE/ARB THEARPY RXD/TAKEN: ICD-10-PCS | Mod: CPTII,S$GLB,, | Performed by: UROLOGY

## 2023-10-24 PROCEDURE — 3008F BODY MASS INDEX DOCD: CPT | Mod: CPTII,S$GLB,, | Performed by: UROLOGY

## 2023-10-24 PROCEDURE — 99999 PR PBB SHADOW E&M-EST. PATIENT-LVL III: ICD-10-PCS | Mod: PBBFAC,,, | Performed by: UROLOGY

## 2023-10-24 PROCEDURE — 3075F PR MOST RECENT SYSTOLIC BLOOD PRESS GE 130-139MM HG: ICD-10-PCS | Mod: CPTII,S$GLB,, | Performed by: UROLOGY

## 2023-10-24 PROCEDURE — 1126F AMNT PAIN NOTED NONE PRSNT: CPT | Mod: CPTII,S$GLB,, | Performed by: UROLOGY

## 2023-10-24 PROCEDURE — 3079F PR MOST RECENT DIASTOLIC BLOOD PRESSURE 80-89 MM HG: ICD-10-PCS | Mod: CPTII,S$GLB,, | Performed by: UROLOGY

## 2023-10-24 PROCEDURE — 3079F DIAST BP 80-89 MM HG: CPT | Mod: CPTII,S$GLB,, | Performed by: UROLOGY

## 2023-10-24 NOTE — PROGRESS NOTES
CC: follow up prostate cancer    History of Present Illness:   Rahul FOSTER Parent is a 73 y.o. male here for follow up.     10/24/23-PSA continues to trend upward. No dysuria or gross hematuria. No bone pain. Denies weak stream and stranguria.     7/21/23-decipher test not completed due to not enough tissue. No new complaints. No flank pain. Stream is good.     6/9/23-UroNav path shows [place] 3+3=6 in 1 core, 3% of tissue sampled. Pt did well after biopsy. No fever. No longer having hematuria. KUB also personally reviewed, showing L lower pole renal stones, which appear stable. Renal US shows stable L hydronephrosis.    4/12/23- MRI prostate read as PI RADS 4 with lesion in the right posterior medial peripheral zone. I have personally reviewed these images. No new complaints. No gross hematuria, urgency, frequency or stranguria.     3/14/23-Stream is good. No gross hematuria. No nocturia. No gross hematuria. No urgency.   12/7/22- Renal US personally reviewed, showing stable L hydro. Non-obstructing L renal stones also seen, measuring up to 11mm. KUB personally reviewed, showing numerous gallstones. No recent stone passage. No flank pain. No RUQ pain. Good stream. No gross hematuria.   6/7/22- TRUS biopsy benign. Not much gross hematuria, now resolved. No fever or dysuria. No perineal pain. Minimal rectal bleeding. Denies flank pain or RUQ pain/bloating  5/24/22- Here for TRUS biopsy, 45g prostate. Renal US personally reviewed, stable. Cholelithiasis. Pt denies RUQ pain.   4/5/22-MRI read as PI RADS 2. 47cc gland. Good stream, no stranguria. No gross hematuria. No flank pain. Decided that he wanted to further watch the PSA before deciding on biopsy.   6/3/21-States that he is doing fine. No pain. No recent passage of stones. Hasn't had stone passage in at least 10-15 years. No gross hematuria. Stream is good. No stranguria/hesitancy/nocturia. Not sure if he was aware of gallstones in the past.   KUB 5/2021 personally  reviewed, showing 1.3cm LLP renal stone, cholelithiasis  Renal US with stable L hdyro    Per [facility]:  5/25/20: PSA bouncing a bit but still at baseline.  Reviewed history in detail. US last year shows a 1.1 cm left lower pole stone.  11/26/18: Mag3 is normal.  40/60 L/R function with acceptable drainage curves.  [facility]  Reviewed history in detail.  6/14/18: No complaints at all, no flank pain after stent removal.  PSA normal.  3/21/18: Cysto/stent pull today uneventful  3/12/18: Left URS shows no obvious UPJ obstruction, no masses, kidney relatively normal.  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    2/21/18: CT Urogram shows a high inserting ureter and obvious poor left function.  No way to say when this happened.  1/24/18: CT showed a left hydronephrosis with no obstructing UPJ stone, possibly congenital, possibly changes from stones.  No sig stones now to treat.  1/18/17: 65 yo pt of Dr. Valentino here for annual exam.  Feels well no complaints.  PSA has bumped at times, no biopsies.  Had a kidney stone 10 years ago he passed on his own.  Had other stones before that.    Review of Systems   Constitutional:  Negative for chills and fever.   Respiratory:  Negative for shortness of breath.    Cardiovascular:  Negative for chest pain.   Gastrointestinal:  Negative for abdominal pain.   Genitourinary:  Negative for flank pain and hematuria.   All other systems reviewed and are negative.      Current Outpatient Medications   Medication Sig Dispense Refill    aspirin (ECOTRIN) 81 MG EC tablet Take 81 mg by mouth once daily.      atorvastatin (LIPITOR) 40 MG tablet TAKE 1/2 TABLET BY MOUTH ONCE A DAY 45 tablet 2    docusate sodium (COLACE) 100 MG capsule Take 1 capsule (100 mg total) by mouth 2 (two) times daily. 60 capsule 0    gemfibroziL (LOPID) 600 MG tablet TAKE 1 TABLET BY MOUTH TWICE DAILY BEFORE MEALS 180 tablet 3    lisinopriL 10 MG tablet Take 1 tablet  by mouth ONCE A DAY 90 tablet 3     Current Facility-Administered Medications   Medication Dose Route Frequency Provider Last Rate Last Admin    LIDOcaine HCl 2% urojet   Mucous Membrane 1 time in Clinic/HOD Cheri Joy MD           Review of patient's allergies indicates:  No Known Allergies    Past medical, family, and social history reviewed as documented in chart with pertinent positive medical, family, and social history detailed in HPI.    Physical Exam  Vitals:    10/24/23 1140   BP: 137/86   Pulse: 80             General: Well-developed, well-nourished, in no acute distress  HEENT: Normocephalic, atraumatic, extraocular eye movements in tact  Neck: supple, trachea midline, no cervical or supraclavicular adenopathy  Respirations: Even and unlabored  Rectal: 6/7/22-40gram prostate without nodules or tenderness. No gross blood.   Extremities: Moves all extremities equally, atraumatic, no clubbing, cyanosis, edema  Skin: warm and dry, no lesions  Psych: normal affect  Neuro: Alert and oriented x 3. Cranial nerves II-XII grossly intact        PSA    11/26/18: 4.1  11/26/19: 6.4  5/20/20: 4.3  6/3/21: 6.6  7/7/21: 5.2  4/5/22: 6.6  5/23/22: 7.4  12/7/22: 10.7  3/7/23: 9.6  6/7/23: 11.7  10/20/23: 13.7    Assessment:   1. Prostate cancer  Ambulatory referral/consult to Radiation Oncology                      Plan:  Prostate cancer  -     Ambulatory referral/consult to Radiation Oncology; Future; Expected date: 10/31/2023      I had another detailed discussion with the patient regarding his rising PSA. His [facility] [employer] 1 pathology is discordant with his PSA, and we discussed that his PSA does place him in the high risk category. We discussed definitive management options, to include XRT and RALP. Pt is interested in XRT, so will refer.   All questions answered.     Follow up in about 3 months (around 1/24/2024).    I spent a total of 30 minutes on the day of the visit.  This includes face to face time  and non-face to face time preparing to see the patient (eg, review of tests), obtaining and/or reviewing separately obtained history, documenting clinical information in the electronic or other health record, independently interpreting results and communicating results to the patient/family/caregiver, or care coordinator.

## 2023-11-16 ENCOUNTER — OFFICE VISIT (OUTPATIENT)
Dept: INTERNAL MEDICINE | Facility: CLINIC | Age: 73
End: 2023-11-16
Payer: MEDICARE

## 2023-11-16 ENCOUNTER — HOSPITAL ENCOUNTER (OUTPATIENT)
Dept: RADIOLOGY | Facility: HOSPITAL | Age: 73
Discharge: HOME | End: 2023-11-16
Attending: INTERNAL MEDICINE
Payer: MEDICARE

## 2023-11-16 VITALS
WEIGHT: 175.94 LBS | OXYGEN SATURATION: 95 % | BODY MASS INDEX: 26.75 KG/M2 | SYSTOLIC BLOOD PRESSURE: 104 MMHG | DIASTOLIC BLOOD PRESSURE: 70 MMHG | HEART RATE: 85 BPM

## 2023-11-16 DIAGNOSIS — Z23 NEED FOR VACCINATION: ICD-10-CM

## 2023-11-16 DIAGNOSIS — I10 ESSENTIAL HYPERTENSION: ICD-10-CM

## 2023-11-16 DIAGNOSIS — M54.50 ACUTE LEFT-SIDED LOW BACK PAIN WITHOUT SCIATICA: ICD-10-CM

## 2023-11-16 DIAGNOSIS — C61 PROSTATE CANCER: ICD-10-CM

## 2023-11-16 DIAGNOSIS — E78.2 MIXED HYPERLIPIDEMIA: ICD-10-CM

## 2023-11-16 DIAGNOSIS — Z00.00 ROUTINE GENERAL MEDICAL EXAMINATION AT HEALTH CARE FACILITY: Primary | ICD-10-CM

## 2023-11-16 PROCEDURE — 99397 PR PREVENTIVE VISIT,EST,65 & OVER: ICD-10-PCS | Mod: 25,S$GLB,, | Performed by: INTERNAL MEDICINE

## 2023-11-16 PROCEDURE — 3074F PR MOST RECENT SYSTOLIC BLOOD PRESSURE < 130 MM HG: ICD-10-PCS | Mod: CPTII,S$GLB,, | Performed by: INTERNAL MEDICINE

## 2023-11-16 PROCEDURE — 72220 XR SACRUM AND COCCYX: ICD-10-PCS | Mod: 26,,, | Performed by: RADIOLOGY

## 2023-11-16 PROCEDURE — 3288F FALL RISK ASSESSMENT DOCD: CPT | Mod: CPTII,S$GLB,, | Performed by: INTERNAL MEDICINE

## 2023-11-16 PROCEDURE — 4010F PR ACE/ARB THEARPY RXD/TAKEN: ICD-10-PCS | Mod: CPTII,S$GLB,, | Performed by: INTERNAL MEDICINE

## 2023-11-16 PROCEDURE — 3288F PR FALLS RISK ASSESSMENT DOCUMENTED: ICD-10-PCS | Mod: CPTII,S$GLB,, | Performed by: INTERNAL MEDICINE

## 2023-11-16 PROCEDURE — 1100F PR PT FALLS ASSESS DOC 2+ FALLS/FALL W/INJURY/YR: ICD-10-PCS | Mod: CPTII,S$GLB,, | Performed by: INTERNAL MEDICINE

## 2023-11-16 PROCEDURE — 1100F PTFALLS ASSESS-DOCD GE2>/YR: CPT | Mod: CPTII,S$GLB,, | Performed by: INTERNAL MEDICINE

## 2023-11-16 PROCEDURE — 90694 VACC AIIV4 NO PRSRV 0.5ML IM: CPT | Mod: S$GLB,,, | Performed by: INTERNAL MEDICINE

## 2023-11-16 PROCEDURE — 72220 X-RAY EXAM SACRUM TAILBONE: CPT | Mod: 26,,, | Performed by: RADIOLOGY

## 2023-11-16 PROCEDURE — G0008 ADMIN INFLUENZA VIRUS VAC: HCPCS | Mod: S$GLB,,, | Performed by: INTERNAL MEDICINE

## 2023-11-16 PROCEDURE — 1159F MED LIST DOCD IN RCRD: CPT | Mod: CPTII,S$GLB,, | Performed by: INTERNAL MEDICINE

## 2023-11-16 PROCEDURE — 72100 XR LUMBAR SPINE AP AND LATERAL: ICD-10-PCS | Mod: 26,,, | Performed by: RADIOLOGY

## 2023-11-16 PROCEDURE — 99999 PR PBB SHADOW E&M-EST. PATIENT-LVL IV: ICD-10-PCS | Mod: PBBFAC,,, | Performed by: INTERNAL MEDICINE

## 2023-11-16 PROCEDURE — 3078F PR MOST RECENT DIASTOLIC BLOOD PRESSURE < 80 MM HG: ICD-10-PCS | Mod: CPTII,S$GLB,, | Performed by: INTERNAL MEDICINE

## 2023-11-16 PROCEDURE — 3074F SYST BP LT 130 MM HG: CPT | Mod: CPTII,S$GLB,, | Performed by: INTERNAL MEDICINE

## 2023-11-16 PROCEDURE — 4010F ACE/ARB THERAPY RXD/TAKEN: CPT | Mod: CPTII,S$GLB,, | Performed by: INTERNAL MEDICINE

## 2023-11-16 PROCEDURE — 1125F AMNT PAIN NOTED PAIN PRSNT: CPT | Mod: CPTII,S$GLB,, | Performed by: INTERNAL MEDICINE

## 2023-11-16 PROCEDURE — G0008 FLU VACCINE - QUADRIVALENT - ADJUVANTED: ICD-10-PCS | Mod: S$GLB,,, | Performed by: INTERNAL MEDICINE

## 2023-11-16 PROCEDURE — 3078F DIAST BP <80 MM HG: CPT | Mod: CPTII,S$GLB,, | Performed by: INTERNAL MEDICINE

## 2023-11-16 PROCEDURE — 3008F PR BODY MASS INDEX (BMI) DOCUMENTED: ICD-10-PCS | Mod: CPTII,S$GLB,, | Performed by: INTERNAL MEDICINE

## 2023-11-16 PROCEDURE — 72220 X-RAY EXAM SACRUM TAILBONE: CPT | Mod: TC,FY,PO

## 2023-11-16 PROCEDURE — 1159F PR MEDICATION LIST DOCUMENTED IN MEDICAL RECORD: ICD-10-PCS | Mod: CPTII,S$GLB,, | Performed by: INTERNAL MEDICINE

## 2023-11-16 PROCEDURE — 99397 PER PM REEVAL EST PAT 65+ YR: CPT | Mod: 25,S$GLB,, | Performed by: INTERNAL MEDICINE

## 2023-11-16 PROCEDURE — 90694 FLU VACCINE - QUADRIVALENT - ADJUVANTED: ICD-10-PCS | Mod: S$GLB,,, | Performed by: INTERNAL MEDICINE

## 2023-11-16 PROCEDURE — 1125F PR PAIN SEVERITY QUANTIFIED, PAIN PRESENT: ICD-10-PCS | Mod: CPTII,S$GLB,, | Performed by: INTERNAL MEDICINE

## 2023-11-16 PROCEDURE — 99999 PR PBB SHADOW E&M-EST. PATIENT-LVL IV: CPT | Mod: PBBFAC,,, | Performed by: INTERNAL MEDICINE

## 2023-11-16 PROCEDURE — 72100 X-RAY EXAM L-S SPINE 2/3 VWS: CPT | Mod: TC,FY,PO

## 2023-11-16 PROCEDURE — 3008F BODY MASS INDEX DOCD: CPT | Mod: CPTII,S$GLB,, | Performed by: INTERNAL MEDICINE

## 2023-11-16 PROCEDURE — 72100 X-RAY EXAM L-S SPINE 2/3 VWS: CPT | Mod: 26,,, | Performed by: RADIOLOGY

## 2023-11-16 NOTE — ASSESSMENT & PLAN NOTE
Blood pressure is under good control.  We will continue the current regimen.  Will work on regular aerobic exercise and a low salt diet.      
Cholesterol numbers look good.  We will continue the current regimen at this time.  Remain focused on low fat diet and high dietary fiber intake.    
Opting for radiation treatment at this time.  Seeing Rad Onc at [facility] in [place].  
Detail Level: Zone
Initiate Treatment: Ketoconazole shampoo Wash the AA qd until well controlled, then decrease to 2-3x's qwk as maintenance.
Samples Given: Stock size of [place] Shampoo wash and lather onto rash.\\nXolegel apply to rash daily at night preferably.

## 2023-11-16 NOTE — PROGRESS NOTES
Subjective:       Patient ID: Rahul FOSTER Parent is a 73 y.o. male.    Chief Complaint: follow up       HPI:  Patient is a 73-year-old male presenting today for updated physical exam.  He indicates he has been doing well.  He is history of hypertension and hyperlipidemia.  He is recently been diagnosed with prostate cancer.  In regards to his blood pressure and cholesterol everything has been stable he is not noting any issues.  He takes his medications as prescribed.      He recently had some problems with a slightly elevated PSA he was being followed by Urology.  MRI was relatively unremarkable and they are going to continue to just monitor but that his PSA started going up precipitously.  As result recommendation from the urologist was to go ahead and and treat for prostate cancer at this time.  They discussed options of surgical intervention versus radiation and he is opted to go with radiation.  He is met with the radiation oncologist at [facility] and they are working doing the metastatic workup at this time and plan to proceed with external beam therapy if everything comes back normal.      Patient has noticed some mild low back discomfort in the left lower back he said he has been present for last couple weeks.  He notes no trauma or injury.  He says just a dull ache it is not really severe.  He mentioned it to the radiation oncologist at his visit there.    Review of Systems   Constitutional:  Negative for fever and unexpected weight change.   HENT:  Negative for hearing loss, postnasal drip and rhinorrhea.    Eyes:  Negative for pain and visual disturbance.   Respiratory:  Negative for cough, shortness of breath and wheezing.    Cardiovascular:  Negative for chest pain and palpitations.   Gastrointestinal:  Negative for constipation, diarrhea, nausea and vomiting.   Genitourinary:  Negative for dysuria and hematuria.   Musculoskeletal:  Positive for back pain. Negative for arthralgias, myalgias and neck  stiffness.   Skin:  Negative for pallor and rash.   Neurological:  Negative for seizures, syncope and headaches.   Hematological:  Negative for adenopathy.   Psychiatric/Behavioral:  Negative for dysphoric mood. The patient is not nervous/anxious.        Objective:   /70   Pulse 85   Wt 79.8 kg (175 lb 14.8 oz)   SpO2 95%   BMI 26.75 kg/m²      Physical Exam  Vitals reviewed.   Constitutional:       General: He is not in acute distress.     Appearance: He is well-developed.   HENT:      Head: Normocephalic and atraumatic.      Right Ear: Tympanic membrane and ear canal normal.      Left Ear: Tympanic membrane and ear canal normal.   Eyes:      Pupils: Pupils are equal, round, and reactive to light.   Neck:      Thyroid: No thyromegaly.      Vascular: No JVD.   Cardiovascular:      Rate and Rhythm: Normal rate and regular rhythm.      Heart sounds: Normal heart sounds. No murmur heard.     No friction rub. No gallop.   Pulmonary:      Effort: Pulmonary effort is normal.      Breath sounds: Normal breath sounds. No wheezing or rales.   Abdominal:      General: Bowel sounds are normal. There is no distension.      Palpations: Abdomen is soft.      Tenderness: There is no abdominal tenderness. There is no guarding or rebound.   Musculoskeletal:         General: Normal range of motion.      Cervical back: Normal range of motion and neck supple.      Comments: Mild tenderness in the left lower back paraspinal musculature.  Nothing specifically bony in nature.   Lymphadenopathy:      Cervical: No cervical adenopathy.   Skin:     General: Skin is warm and dry.      Findings: No rash.   Neurological:      General: No focal deficit present.      Mental Status: He is alert and oriented to person, place, and time.      Cranial Nerves: No cranial nerve deficit.      Deep Tendon Reflexes: Reflexes are normal and symmetric.   Psychiatric:         Mood and Affect: Mood normal.         Judgment: Judgment normal.         No  visits with results within 2 Week(s) from this visit.   Latest known visit with results is:   Lab Visit on 10/20/2023   Component Date Value    PSA Diagnostic 10/20/2023 13.7 (H)        Assessment:       1. Routine general medical examination at health care facility    2. Essential hypertension    3. Mixed hyperlipidemia    4. Prostate cancer    5. Acute left-sided low back pain without sciatica    6. Need for vaccination        Plan:   Essential hypertension  Blood pressure is under good control.  We will continue the current regimen.  Will work on regular aerobic exercise and a low salt diet.        Hyperlipidemia  Cholesterol numbers look good.  We will continue the current regimen at this time.  Remain focused on low fat diet and high dietary fiber intake.      Prostate cancer  Opting for radiation treatment at this time.  Seeing Rad Onc at [facility] in [place].  Routine general medical examination at health care facility  Comments:  Focus on good health habits, low fat diet, regular exercise, seatbelt use, sunscreen use    Essential hypertension  -     CBC Auto Differential; Future; Expected date: 11/16/2023  -     Comprehensive Metabolic Panel; Future; Expected date: 11/16/2023  -     Lipid Panel; Future; Expected date: 11/16/2023    Mixed hyperlipidemia  -     Lipid Panel; Future; Expected date: 11/16/2023    Prostate cancer    Acute left-sided low back pain without sciatica  Comments:  xray lspine, sacrum coccyx  Orders:  -     X-Ray Lumbar Spine AP And Lateral; Future; Expected date: 11/16/2023  -     X-Ray Sacrum And Coccyx; Future; Expected date: 11/16/2023    Need for vaccination  -     Influenza - Quadrivalent (Adjuvanted)    In regards to his back pain is most likely just a benign arthritic/musculoskeletal discomfort.  I am going to go ahead and get an L-spine and sacrum and coccyx films to rule out any obvious abnormalities on bone.  As part of his metastatic workup he should be receiving a bone scan and I  educated him about that so if he does not have a bone scan he can let me know.  Given his presentation however it is highly unlikely this would be a metastatic lesion discomfort.      Follow up in about 6 months (around 5/16/2024).

## 2023-12-05 DIAGNOSIS — I10 ESSENTIAL HYPERTENSION: ICD-10-CM

## 2023-12-05 RX ORDER — LISINOPRIL 10 MG/1
TABLET ORAL
Qty: 90 TABLET | Refills: 3 | Status: SHIPPED | OUTPATIENT
Start: 2023-12-05

## 2023-12-05 NOTE — TELEPHONE ENCOUNTER
No care due was identified.  [facility] Embedded Care Due Messages. Reference number: [id number].   12/05/2023 7:35:44 AM CST

## 2023-12-05 NOTE — TELEPHONE ENCOUNTER
Refill Decision Note   Rahul Parent  is requesting a refill authorization.  Brief Assessment and Rationale for Refill:  Approve     Medication Therapy Plan:         Comments:     Note composed:8:34 AM 12/05/2023

## 2024-01-16 DIAGNOSIS — E78.2 MIXED HYPERLIPIDEMIA: ICD-10-CM

## 2024-01-16 RX ORDER — ATORVASTATIN CALCIUM 40 MG/1
20 TABLET, FILM COATED ORAL DAILY
Qty: 45 TABLET | Refills: 3 | Status: SHIPPED | OUTPATIENT
Start: 2024-01-16

## 2024-01-16 RX ORDER — GEMFIBROZIL 600 MG/1
600 TABLET, FILM COATED ORAL
Qty: 180 TABLET | Refills: 3 | Status: SHIPPED | OUTPATIENT
Start: 2024-01-16

## 2024-01-16 NOTE — TELEPHONE ENCOUNTER
Rahul Yip  is requesting a refill authorization.  Brief Assessment and Rationale for Refill:  Approve     Medication Therapy Plan:         Pharmacist review requested: Yes   Extended chart review required: Yes   Comments:     Note composed:10:27 AM 01/16/2024

## 2024-01-16 NOTE — TELEPHONE ENCOUNTER
No care due was identified.  [facility] Embedded Care Due Messages. Reference number: [id number].   1/16/2024 10:17:54 AM CST

## 2024-01-16 NOTE — TELEPHONE ENCOUNTER
----- Message from Maria Del Carmen Morillo sent at 1/16/2024  9:47 AM CST -----  e:  RX Refill Request        Who Called: pt        RX Name and Strength:   gemfibroziL (LOPID) 600 MG tablet  atorvastatin (LIPITOR) 40 MG tablet          How is the patient currently taking it? (ex. 1XDay): 1 X day        Is this a 30 day or 90 day RX: 30        Preferred Pharmacy with phone number:  [address] (Ph: [phone])        Local or Mail Order: local        Ordering Provider: [place]        Would the patient rather a call back or a response via [facility]? call        Best Call Back Number: [phone]        Additional Information: call back

## 2024-01-16 NOTE — TELEPHONE ENCOUNTER
Refill Routing Note   Medication(s) are not appropriate for processing by [facility] for the following reason(s):        Drug-disease interaction     [facility] action(s):  Defer             Pharmacist review requested: Yes     Appointments  past 12m or future 3m with PCP    Date Provider   Last Visit   11/16/2023 Tyler Glez MD   Next Visit   5/16/2024 Tyler Glez MD   ED visits in past 90 days: 0        Note composed:10:23 AM 01/16/2024

## 2024-01-24 ENCOUNTER — OFFICE VISIT (OUTPATIENT)
Dept: UROLOGY | Facility: CLINIC | Age: 74
End: 2024-01-24
Payer: MEDICARE

## 2024-01-24 VITALS
WEIGHT: 177.69 LBS | BODY MASS INDEX: 26.93 KG/M2 | HEART RATE: 62 BPM | SYSTOLIC BLOOD PRESSURE: 130 MMHG | HEIGHT: 68 IN | DIASTOLIC BLOOD PRESSURE: 84 MMHG

## 2024-01-24 DIAGNOSIS — C61 PROSTATE CANCER: Primary | ICD-10-CM

## 2024-01-24 DIAGNOSIS — N20.0 RENAL STONE: ICD-10-CM

## 2024-01-24 LAB
BILIRUB UR QL STRIP: NEGATIVE
GLUCOSE UR QL STRIP: NEGATIVE
KETONES UR QL STRIP: NEGATIVE
LEUKOCYTE ESTERASE UR QL STRIP: NEGATIVE
PH, POC UA: 7
POC BLOOD, URINE: NEGATIVE
POC NITRATES, URINE: NEGATIVE
POC RESIDUAL URINE VOLUME: 89 ML (ref 0–100)
PROT UR QL STRIP: NEGATIVE
SP GR UR STRIP: 1.01 (ref 1–1.03)
UROBILINOGEN UR STRIP-ACNC: 0.2 (ref 0.3–2.2)

## 2024-01-24 PROCEDURE — 3008F BODY MASS INDEX DOCD: CPT | Mod: CPTII,S$GLB,, | Performed by: UROLOGY

## 2024-01-24 PROCEDURE — 51798 US URINE CAPACITY MEASURE: CPT | Mod: S$GLB,,, | Performed by: UROLOGY

## 2024-01-24 PROCEDURE — 1159F MED LIST DOCD IN RCRD: CPT | Mod: CPTII,S$GLB,, | Performed by: UROLOGY

## 2024-01-24 PROCEDURE — 3079F DIAST BP 80-89 MM HG: CPT | Mod: CPTII,S$GLB,, | Performed by: UROLOGY

## 2024-01-24 PROCEDURE — 3288F FALL RISK ASSESSMENT DOCD: CPT | Mod: CPTII,S$GLB,, | Performed by: UROLOGY

## 2024-01-24 PROCEDURE — 1126F AMNT PAIN NOTED NONE PRSNT: CPT | Mod: CPTII,S$GLB,, | Performed by: UROLOGY

## 2024-01-24 PROCEDURE — 81003 URINALYSIS AUTO W/O SCOPE: CPT | Mod: QW,S$GLB,, | Performed by: UROLOGY

## 2024-01-24 PROCEDURE — 3075F SYST BP GE 130 - 139MM HG: CPT | Mod: CPTII,S$GLB,, | Performed by: UROLOGY

## 2024-01-24 PROCEDURE — 99214 OFFICE O/P EST MOD 30 MIN: CPT | Mod: S$GLB,,, | Performed by: UROLOGY

## 2024-01-24 PROCEDURE — 4010F ACE/ARB THERAPY RXD/TAKEN: CPT | Mod: CPTII,S$GLB,, | Performed by: UROLOGY

## 2024-01-24 PROCEDURE — 99999 PR PBB SHADOW E&M-EST. PATIENT-LVL III: CPT | Mod: PBBFAC,,, | Performed by: UROLOGY

## 2024-01-24 PROCEDURE — 1101F PT FALLS ASSESS-DOCD LE1/YR: CPT | Mod: CPTII,S$GLB,, | Performed by: UROLOGY

## 2024-01-24 NOTE — PROGRESS NOTES
CC: follow up prostate cancer    History of Present Illness:   Rahul FOSTER Parent is a 73 y.o. male here for follow up.       1/24/24-Getting XRT, today is day 8/28. He has frequency, no dysuria. No gross hematuria. Lumbar spine X-ray personally reviewed from 11/16/23, noting a small L lower pole renal stone. No flank pain.     10/24/23-PSA continues to trend upward. No dysuria or gross hematuria. No bone pain. Denies weak stream and stranguria.     7/21/23-decipher test not completed due to not enough tissue. No new complaints. No flank pain. Stream is good.     6/9/23-UroNav path shows [place] 3+3=6 in 1 core, 3% of tissue sampled. Pt did well after biopsy. No fever. No longer having hematuria. KUB also personally reviewed, showing L lower pole renal stones, which appear stable. Renal US shows stable L hydronephrosis.    4/12/23- MRI prostate read as PI RADS 4 with lesion in the right posterior medial peripheral zone. I have personally reviewed these images. No new complaints. No gross hematuria, urgency, frequency or stranguria.     3/14/23-Stream is good. No gross hematuria. No nocturia. No gross hematuria. No urgency.   12/7/22- Renal US personally reviewed, showing stable L hydro. Non-obstructing L renal stones also seen, measuring up to 11mm. KUB personally reviewed, showing numerous gallstones. No recent stone passage. No flank pain. No RUQ pain. Good stream. No gross hematuria.   6/7/22- TRUS biopsy benign. Not much gross hematuria, now resolved. No fever or dysuria. No perineal pain. Minimal rectal bleeding. Denies flank pain or RUQ pain/bloating  5/24/22- Here for TRUS biopsy, 45g prostate. Renal US personally reviewed, stable. Cholelithiasis. Pt denies RUQ pain.   4/5/22-MRI read as PI RADS 2. 47cc gland. Good stream, no stranguria. No gross hematuria. No flank pain. Decided that he wanted to further watch the PSA before deciding on biopsy.   6/3/21-States that he is doing fine. No pain. No recent passage  of stones. Hasn't had stone passage in at least 10-15 years. No gross hematuria. Stream is good. No stranguria/hesitancy/nocturia. Not sure if he was aware of gallstones in the past.   KUB 5/2021 personally reviewed, showing 1.3cm LLP renal stone, cholelithiasis  Renal US with stable L hdyro    Per Dukes:  5/25/20: PSA bouncing a bit but still at baseline.  Reviewed history in detail. US last year shows a 1.1 cm left lower pole stone.  11/26/18: Mag3 is normal.  40/60 L/R function with acceptable drainage curves.  [facility] [employer] [facility]  Reviewed history in detail.  6/14/18: No complaints at all, no flank pain after stent removal.  PSA normal.  3/21/18: Cysto/stent pull today uneventful  3/12/18: Left URS shows no obvious UPJ obstruction, no masses, kidney relatively normal.  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    2/21/18: CT Urogram shows a high inserting ureter and obvious poor left function.  No way to say when this happened.  1/24/18: CT showed a left hydronephrosis with no obstructing UPJ stone, possibly congenital, possibly changes from stones.  No sig stones now to treat.  1/18/17: 65 yo pt of Dr. Valentino here for annual exam.  Feels well no complaints.  PSA has bumped at times, no biopsies.  Had a kidney stone 10 years ago he passed on his own.  Had other stones before that.    Review of Systems   Constitutional:  Negative for chills and fever.   Respiratory:  Negative for shortness of breath.    Cardiovascular:  Negative for chest pain.   Gastrointestinal:  Negative for abdominal pain.   Genitourinary:  Negative for flank pain and hematuria.   All other systems reviewed and are negative.      Current Outpatient Medications   Medication Sig Dispense Refill    aspirin (ECOTRIN) 81 MG EC tablet Take 81 mg by mouth once daily.      atorvastatin (LIPITOR) 40 MG tablet Take 0.5 tablets (20 mg total) by mouth once daily. 45 tablet 3    docusate sodium (COLACE) 100  MG capsule Take 1 capsule (100 mg total) by mouth 2 (two) times daily. 60 capsule 0    gemfibroziL (LOPID) 600 MG tablet Take 1 tablet (600 mg total) by mouth 2 (two) times daily before meals. 180 tablet 3    lisinopriL 10 MG tablet Take 1 tablet by mouth ONCE A DAY 90 tablet 3     No current facility-administered medications for this visit.       Review of patient's allergies indicates:  No Known Allergies    Past medical, family, and social history reviewed as documented in chart with pertinent positive medical, family, and social history detailed in HPI.    Physical Exam  Vitals:    01/24/24 1311   BP: 130/84   Pulse: 62             General: Well-developed, well-nourished, in no acute distress  HEENT: Normocephalic, atraumatic, extraocular eye movements in tact  Neck: supple, trachea midline, no cervical or supraclavicular adenopathy  Respirations: Even and unlabored  Rectal: 6/7/22-40gram prostate without nodules or tenderness. No gross blood.   Extremities: Moves all extremities equally, atraumatic, no clubbing, cyanosis, edema  Skin: warm and dry, no lesions  Psych: normal affect  Neuro: Alert and oriented x 3. Cranial nerves II-XII grossly intact        PSA    11/26/18: 4.1  11/26/19: 6.4  5/20/20: 4.3  6/3/21: 6.6  7/7/21: 5.2  4/5/22: 6.6  5/23/22: 7.4  12/7/22: 10.7  3/7/23: 9.6  6/7/23: 11.7  10/20/23: 13.7    Assessment:   1. Prostate cancer  POCT Urinalysis, Dipstick, Automated, W/O Scope    POCT Bladder Scan    Prostate Specific Antigen, Diagnostic      2. Renal stone                        Plan:  Prostate cancer  -     POCT Urinalysis, Dipstick, Automated, W/O Scope  -     POCT Bladder Scan  -     Prostate Specific Antigen, Diagnostic; Future; Expected date: 04/24/2024    Renal stone          Follow up in about 3 months (around 4/24/2024) for labs before visit.

## 2024-05-15 ENCOUNTER — LAB VISIT (OUTPATIENT)
Dept: LAB | Facility: HOSPITAL | Age: 74
End: 2024-05-15
Attending: UROLOGY
Payer: MEDICARE

## 2024-05-15 DIAGNOSIS — C61 PROSTATE CANCER: ICD-10-CM

## 2024-05-15 LAB — COMPLEXED PSA SERPL-MCNC: 5.3 NG/ML (ref 0–4)

## 2024-05-15 PROCEDURE — 36415 COLL VENOUS BLD VENIPUNCTURE: CPT | Mod: PN | Performed by: UROLOGY

## 2024-05-15 PROCEDURE — 84153 ASSAY OF PSA TOTAL: CPT | Performed by: UROLOGY

## 2024-05-16 ENCOUNTER — OFFICE VISIT (OUTPATIENT)
Dept: INTERNAL MEDICINE | Facility: CLINIC | Age: 74
End: 2024-05-16
Payer: MEDICARE

## 2024-05-16 VITALS
HEIGHT: 68 IN | TEMPERATURE: 97 F | HEART RATE: 72 BPM | DIASTOLIC BLOOD PRESSURE: 76 MMHG | BODY MASS INDEX: 25.62 KG/M2 | OXYGEN SATURATION: 96 % | WEIGHT: 169.06 LBS | SYSTOLIC BLOOD PRESSURE: 116 MMHG

## 2024-05-16 DIAGNOSIS — E78.2 MIXED HYPERLIPIDEMIA: ICD-10-CM

## 2024-05-16 DIAGNOSIS — C61 PROSTATE CANCER: ICD-10-CM

## 2024-05-16 DIAGNOSIS — I10 ESSENTIAL HYPERTENSION: Primary | ICD-10-CM

## 2024-05-16 PROCEDURE — 3288F FALL RISK ASSESSMENT DOCD: CPT | Mod: CPTII,S$GLB,, | Performed by: NURSE PRACTITIONER

## 2024-05-16 PROCEDURE — 3078F DIAST BP <80 MM HG: CPT | Mod: CPTII,S$GLB,, | Performed by: NURSE PRACTITIONER

## 2024-05-16 PROCEDURE — 1160F RVW MEDS BY RX/DR IN RCRD: CPT | Mod: CPTII,S$GLB,, | Performed by: NURSE PRACTITIONER

## 2024-05-16 PROCEDURE — 1101F PT FALLS ASSESS-DOCD LE1/YR: CPT | Mod: CPTII,S$GLB,, | Performed by: NURSE PRACTITIONER

## 2024-05-16 PROCEDURE — 99213 OFFICE O/P EST LOW 20 MIN: CPT | Mod: S$GLB,,, | Performed by: NURSE PRACTITIONER

## 2024-05-16 PROCEDURE — 4010F ACE/ARB THERAPY RXD/TAKEN: CPT | Mod: CPTII,S$GLB,, | Performed by: NURSE PRACTITIONER

## 2024-05-16 PROCEDURE — 1159F MED LIST DOCD IN RCRD: CPT | Mod: CPTII,S$GLB,, | Performed by: NURSE PRACTITIONER

## 2024-05-16 PROCEDURE — 99999 PR PBB SHADOW E&M-EST. PATIENT-LVL III: CPT | Mod: PBBFAC,,, | Performed by: NURSE PRACTITIONER

## 2024-05-16 PROCEDURE — 3074F SYST BP LT 130 MM HG: CPT | Mod: CPTII,S$GLB,, | Performed by: NURSE PRACTITIONER

## 2024-05-16 PROCEDURE — 1126F AMNT PAIN NOTED NONE PRSNT: CPT | Mod: CPTII,S$GLB,, | Performed by: NURSE PRACTITIONER

## 2024-05-16 NOTE — PROGRESS NOTES
"Subjective:       Patient ID: Rahul FOSTER Parent is a 73 y.o. male.    Chief Complaint: Follow-up (6 mo f/u)    Pt presents to clinic today for follow up  Pt with hx of hypertension, hyperlipidemia, and prostate cancer    He indicates he has been doing well.       In regards to his blood pressure and cholesterol everything has been stable   he is not noting any issues.    He takes his medications as prescribed.      He is followed by Dr. Villalobos at [facility] and Dr. Joy for his prostate cancer  He has completed his radiation treatment  PSA is trending down  Has follow up next week        /76   Pulse 72   Temp 96.9 °F (36.1 °C)   Ht 5' 8" (1.727 m)   Wt 76.7 kg (169 lb 1.5 oz)   SpO2 96%   BMI 25.71 kg/m²     Review of Systems   Constitutional:  Negative for activity change, appetite change, chills, diaphoresis, fatigue, fever and unexpected weight change.   HENT: Negative.     Eyes: Negative.    Respiratory:  Negative for apnea, chest tightness, shortness of breath and stridor.    Cardiovascular:  Negative for chest pain, palpitations and leg swelling.   Gastrointestinal: Negative.    Endocrine: Negative.    Genitourinary: Negative.    Musculoskeletal:  Negative for arthralgias and myalgias.   Skin:  Negative for color change, pallor, rash and wound.   Allergic/Immunologic: Negative.    Neurological:  Negative for dizziness, facial asymmetry, light-headedness and headaches.   Hematological:  Negative for adenopathy.   Psychiatric/Behavioral:  Negative for agitation and behavioral problems.        Objective:      Physical Exam  Vitals and nursing note reviewed.   Constitutional:       General: He is not in acute distress.     Appearance: He is well-developed. He is not diaphoretic.   HENT:      Head: Normocephalic and atraumatic.   Cardiovascular:      Rate and Rhythm: Normal rate and regular rhythm.      Heart sounds: Normal heart sounds.   Pulmonary:      Effort: Pulmonary effort is normal. No " respiratory distress.      Breath sounds: Normal breath sounds.   Skin:     General: Skin is warm and dry.      Findings: No rash.   Neurological:      Mental Status: He is alert and oriented to person, place, and time.   Psychiatric:         Behavior: Behavior normal.         Thought Content: Thought content normal.         Judgment: Judgment normal.         Assessment:       1. Essential hypertension    2. Mixed hyperlipidemia    3. Prostate cancer    4. BMI 25.0-25.9,adult        Plan:       Rahul was seen today for follow-up.    Diagnoses and all orders for this visit:    Essential hypertension  -     CBC Auto Differential; Future  -     Comprehensive Metabolic Panel; Future  - Chronic, stable on current meds. Continue     Mixed hyperlipidemia  -     Lipid Panel; Future  - Chronic, stable on current meds    Prostate cancer     - New, continue following with your specialist    BMI 25.0-25.9,adult      Overall pt is doing well  Continue meds as prescribed  Follow up in 6 months with labs prior and PRN

## 2024-05-22 ENCOUNTER — OFFICE VISIT (OUTPATIENT)
Dept: UROLOGY | Facility: CLINIC | Age: 74
End: 2024-05-22
Payer: MEDICARE

## 2024-05-22 VITALS
DIASTOLIC BLOOD PRESSURE: 65 MMHG | TEMPERATURE: 98 F | HEIGHT: 68 IN | RESPIRATION RATE: 17 BRPM | BODY MASS INDEX: 25.73 KG/M2 | HEART RATE: 62 BPM | WEIGHT: 169.75 LBS | SYSTOLIC BLOOD PRESSURE: 102 MMHG

## 2024-05-22 DIAGNOSIS — C61 PROSTATE CANCER: ICD-10-CM

## 2024-05-22 DIAGNOSIS — N20.0 RENAL STONE: Primary | ICD-10-CM

## 2024-05-22 PROCEDURE — 3078F DIAST BP <80 MM HG: CPT | Mod: CPTII,S$GLB,, | Performed by: UROLOGY

## 2024-05-22 PROCEDURE — 1159F MED LIST DOCD IN RCRD: CPT | Mod: CPTII,S$GLB,, | Performed by: UROLOGY

## 2024-05-22 PROCEDURE — 4010F ACE/ARB THERAPY RXD/TAKEN: CPT | Mod: CPTII,S$GLB,, | Performed by: UROLOGY

## 2024-05-22 PROCEDURE — 1126F AMNT PAIN NOTED NONE PRSNT: CPT | Mod: CPTII,S$GLB,, | Performed by: UROLOGY

## 2024-05-22 PROCEDURE — 99999 PR PBB SHADOW E&M-EST. PATIENT-LVL III: CPT | Mod: PBBFAC,,, | Performed by: UROLOGY

## 2024-05-22 PROCEDURE — 3074F SYST BP LT 130 MM HG: CPT | Mod: CPTII,S$GLB,, | Performed by: UROLOGY

## 2024-05-22 PROCEDURE — 1160F RVW MEDS BY RX/DR IN RCRD: CPT | Mod: CPTII,S$GLB,, | Performed by: UROLOGY

## 2024-05-22 PROCEDURE — 3288F FALL RISK ASSESSMENT DOCD: CPT | Mod: CPTII,S$GLB,, | Performed by: UROLOGY

## 2024-05-22 PROCEDURE — 1101F PT FALLS ASSESS-DOCD LE1/YR: CPT | Mod: CPTII,S$GLB,, | Performed by: UROLOGY

## 2024-05-22 PROCEDURE — 99213 OFFICE O/P EST LOW 20 MIN: CPT | Mod: S$GLB,,, | Performed by: UROLOGY

## 2024-05-22 NOTE — PROGRESS NOTES
CC: follow up prostate cancer    History of Present Illness:   Rahul FOSTER Parent is a 73 y.o. male here for follow up.     5/22/24-feels back to normal following XRT. Completed it about 3 months ago. No dysuria or gross hematuria. No recent stones. No flank or abdominal pain.     1/24/24-Getting XRT, today is day 8/28. He has frequency, no dysuria. No gross hematuria. Lumbar spine X-ray personally reviewed from 11/16/23, noting a small L lower pole renal stone. No flank pain.     10/24/23-PSA continues to trend upward. No dysuria or gross hematuria. No bone pain. Denies weak stream and stranguria.     7/21/23-decipher test not completed due to not enough tissue. No new complaints. No flank pain. Stream is good.     6/9/23-UroNav path shows Martín 3+3=6 in 1 core, 3% of tissue sampled. Pt did well after biopsy. No fever. No longer having hematuria. KUB also personally reviewed, showing L lower pole renal stones, which appear stable. Renal US shows stable L hydronephrosis.    4/12/23- MRI prostate read as PI RADS 4 with lesion in the right posterior medial peripheral zone. I have personally reviewed these images. No new complaints. No gross hematuria, urgency, frequency or stranguria.     3/14/23-Stream is good. No gross hematuria. No nocturia. No gross hematuria. No urgency.   12/7/22- Renal US personally reviewed, showing stable L hydro. Non-obstructing L renal stones also seen, measuring up to 11mm. KUB personally reviewed, showing numerous gallstones. No recent stone passage. No flank pain. No RUQ pain. Good stream. No gross hematuria.   6/7/22- TRUS biopsy benign. Not much gross hematuria, now resolved. No fever or dysuria. No perineal pain. Minimal rectal bleeding. Denies flank pain or RUQ pain/bloating  5/24/22- Here for TRUS biopsy, 45g prostate. Renal US personally reviewed, stable. Cholelithiasis. Pt denies RUQ pain.   4/5/22-MRI read as PI RADS 2. 47cc gland. Good stream, no stranguria. No gross hematuria.  No flank pain. Decided that he wanted to further watch the PSA before deciding on biopsy.   6/3/21-States that he is doing fine. No pain. No recent passage of stones. Hasn't had stone passage in at least 10-15 years. No gross hematuria. Stream is good. No stranguria/hesitancy/nocturia. Not sure if he was aware of gallstones in the past.   KUB 5/2021 personally reviewed, showing 1.3cm LLP renal stone, cholelithiasis  Renal US with stable L hdyro    Per Ernst:  5/25/20: PSA bouncing a bit but still at baseline.  Reviewed history in detail. US last year shows a 1.1 cm left lower pole stone.  11/26/18: Mag3 is normal.  40/60 L/R function with acceptable drainage curves.  [facility] [employer] [facility].  Reviewed history in detail.  6/14/18: No complaints at all, no flank pain after stent removal.  PSA normal.  3/21/18: Cysto/stent pull today uneventful  3/12/18: Left URS shows no obvious UPJ obstruction, no masses, kidney relatively normal.  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    2/21/18: CT Urogram shows a high inserting ureter and obvious poor left function.  No way to say when this happened.  1/24/18: CT showed a left hydronephrosis with no obstructing UPJ stone, possibly congenital, possibly changes from stones.  No sig stones now to treat.  1/18/17: 65 yo pt of Dr. Valentino here for annual exam.  Feels well no complaints.  PSA has bumped at times, no biopsies.  Had a kidney stone 10 years ago he passed on his own.  Had other stones before that.    Review of Systems   Constitutional:  Negative for chills and fever.   Respiratory:  Negative for shortness of breath.    Cardiovascular:  Negative for chest pain.   Gastrointestinal:  Negative for abdominal pain.   Genitourinary:  Negative for flank pain and hematuria.   All other systems reviewed and are negative.      Current Outpatient Medications   Medication Sig Dispense Refill    aspirin (ECOTRIN) 81 MG EC tablet Take 81 mg by  mouth once daily.      atorvastatin (LIPITOR) 40 MG tablet Take 0.5 tablets (20 mg total) by mouth once daily. 45 tablet 3    docusate sodium (COLACE) 100 MG capsule Take 1 capsule (100 mg total) by mouth 2 (two) times daily. 60 capsule 0    gemfibroziL (LOPID) 600 MG tablet Take 1 tablet (600 mg total) by mouth 2 (two) times daily before meals. 180 tablet 3    lisinopriL 10 MG tablet Take 1 tablet by mouth ONCE A DAY 90 tablet 3     No current facility-administered medications for this visit.       Review of patient's allergies indicates:  No Known Allergies    Past medical, family, and social history reviewed as documented in chart with pertinent positive medical, family, and social history detailed in HPI.    Physical Exam  Vitals:    05/22/24 0813   BP: 102/65   Pulse: 62   Resp: 17   Temp: 97.9 °F (36.6 °C)             General: Well-developed, well-nourished, in no acute distress  HEENT: Normocephalic, atraumatic, extraocular eye movements in tact  Neck: supple, trachea midline, no cervical or supraclavicular adenopathy  Respirations: Even and unlabored  Abdomen: soft, NTND, no masses, rebound or guarding  Rectal: 6/7/22-40gram prostate without nodules or tenderness. No gross blood.   Extremities: Moves all extremities equally, atraumatic, no clubbing, cyanosis, edema  Skin: warm and dry, no lesions  Psych: normal affect  Neuro: Alert and oriented x 3. Cranial nerves II-XII grossly intact        PSA    11/26/18: 4.1  11/26/19: 6.4  5/20/20: 4.3  6/3/21: 6.6  7/7/21: 5.2  4/5/22: 6.6  5/23/22: 7.4  12/7/22: 10.7  3/7/23: 9.6  6/7/23: 11.7  10/20/23: 13.7  5/15/24: 5.3    Assessment:   1. Renal stone  US Retroperitoneal Complete      2. Prostate cancer  Prostate Specific Antigen, Diagnostic                        Plan:  Renal stone  -     US Retroperitoneal Complete; Future; Expected date: 05/22/2024    Prostate cancer  -     Prostate Specific Antigen, Diagnostic; Future; Expected date: 11/22/2024      Will  continue to monitor PSA decline.       Follow up in about 6 months (around 11/22/2024) for labs before visit.

## 2024-05-29 ENCOUNTER — HOSPITAL ENCOUNTER (OUTPATIENT)
Dept: RADIOLOGY | Facility: HOSPITAL | Age: 74
Discharge: HOME | End: 2024-05-29
Attending: UROLOGY
Payer: MEDICARE

## 2024-05-29 DIAGNOSIS — N20.0 RENAL STONE: ICD-10-CM

## 2024-05-29 PROCEDURE — 76770 US EXAM ABDO BACK WALL COMP: CPT | Mod: 26,,, | Performed by: RADIOLOGY

## 2024-05-29 PROCEDURE — 76770 US EXAM ABDO BACK WALL COMP: CPT | Mod: TC,PN

## 2024-10-18 DIAGNOSIS — E78.2 MIXED HYPERLIPIDEMIA: ICD-10-CM

## 2024-10-18 RX ORDER — ATORVASTATIN CALCIUM 40 MG/1
20 TABLET, FILM COATED ORAL DAILY
Qty: 45 TABLET | Refills: 0 | Status: SHIPPED | OUTPATIENT
Start: 2024-10-18

## 2024-10-18 NOTE — TELEPHONE ENCOUNTER
Care Due:                  Date            Visit Type   Department     Provider  --------------------------------------------------------------------------------                                ESTABLISHED   [facility] INTERNAL  Last Visit: 11-      PATIENT      MEDICINE       Tyler Glez  Next Visit: None Scheduled  None         None Found                                                            Last  Test          Frequency    Reason                     Performed    Due Date  --------------------------------------------------------------------------------    CBC.........  12 months..  gemfibroziL..............  11- 11-    CMP.........  12 months..  atorvastatin,              11- 11-                             gemfibroziL, lisinopriL..    Lipid Panel.  12 months..  atorvastatin, gemfibroziL  11- 11-    [facility] Embedded Care Due Messages. Reference number: [id number].   10/18/2024 8:37:41 AM CDT

## 2024-10-18 NOTE — TELEPHONE ENCOUNTER
Provider Staff:  Action required for this patient    Requires labs      Please see care gap opportunities below in Care Due Message.    Thanks!  [facility]     Appointments      Date Provider   Last Visit   11/16/2023 Tyler Glez MD   Next Visit   Visit date not found Tyler Glez MD     Refill Decision Note   Rahul Yip  is requesting a refill authorization.  Brief Assessment and Rationale for Refill:  Approve     Medication Therapy Plan:        Comments:     Note composed:8:49 AM 10/18/2024

## 2024-11-22 ENCOUNTER — LAB VISIT (OUTPATIENT)
Dept: LAB | Facility: HOSPITAL | Age: 74
End: 2024-11-22
Attending: UROLOGY
Payer: MEDICARE

## 2024-11-22 DIAGNOSIS — C61 PROSTATE CANCER: ICD-10-CM

## 2024-11-22 LAB — COMPLEXED PSA SERPL-MCNC: 1.4 NG/ML (ref 0–4)

## 2024-11-22 PROCEDURE — 36415 COLL VENOUS BLD VENIPUNCTURE: CPT | Mod: PN | Performed by: UROLOGY

## 2024-11-22 PROCEDURE — 84153 ASSAY OF PSA TOTAL: CPT | Performed by: UROLOGY

## 2024-11-27 ENCOUNTER — OFFICE VISIT (OUTPATIENT)
Dept: UROLOGY | Facility: CLINIC | Age: 74
End: 2024-11-27
Payer: MEDICARE

## 2024-11-27 VITALS
HEART RATE: 69 BPM | WEIGHT: 169.75 LBS | BODY MASS INDEX: 25.73 KG/M2 | SYSTOLIC BLOOD PRESSURE: 131 MMHG | RESPIRATION RATE: 16 BRPM | DIASTOLIC BLOOD PRESSURE: 81 MMHG | HEIGHT: 68 IN

## 2024-11-27 DIAGNOSIS — C61 PROSTATE CANCER: Primary | ICD-10-CM

## 2024-11-27 DIAGNOSIS — N20.0 RENAL STONE: ICD-10-CM

## 2024-11-27 LAB
BILIRUB UR QL STRIP: NEGATIVE
GLUCOSE UR QL STRIP: NEGATIVE
KETONES UR QL STRIP: NEGATIVE
LEUKOCYTE ESTERASE UR QL STRIP: NEGATIVE
PH, POC UA: 6
POC BLOOD, URINE: NEGATIVE
POC NITRATES, URINE: NEGATIVE
PROT UR QL STRIP: NEGATIVE
SP GR UR STRIP: 1.01 (ref 1–1.03)
UROBILINOGEN UR STRIP-ACNC: 0.2 (ref 0.3–2.2)

## 2024-11-27 PROCEDURE — 4010F ACE/ARB THERAPY RXD/TAKEN: CPT | Mod: CPTII,S$GLB,, | Performed by: UROLOGY

## 2024-11-27 PROCEDURE — 3075F SYST BP GE 130 - 139MM HG: CPT | Mod: CPTII,S$GLB,, | Performed by: UROLOGY

## 2024-11-27 PROCEDURE — 81003 URINALYSIS AUTO W/O SCOPE: CPT | Mod: QW,S$GLB,, | Performed by: UROLOGY

## 2024-11-27 PROCEDURE — 1160F RVW MEDS BY RX/DR IN RCRD: CPT | Mod: CPTII,S$GLB,, | Performed by: UROLOGY

## 2024-11-27 PROCEDURE — 1126F AMNT PAIN NOTED NONE PRSNT: CPT | Mod: CPTII,S$GLB,, | Performed by: UROLOGY

## 2024-11-27 PROCEDURE — 3079F DIAST BP 80-89 MM HG: CPT | Mod: CPTII,S$GLB,, | Performed by: UROLOGY

## 2024-11-27 PROCEDURE — 99213 OFFICE O/P EST LOW 20 MIN: CPT | Mod: S$GLB,,, | Performed by: UROLOGY

## 2024-11-27 PROCEDURE — 3288F FALL RISK ASSESSMENT DOCD: CPT | Mod: CPTII,S$GLB,, | Performed by: UROLOGY

## 2024-11-27 PROCEDURE — 1159F MED LIST DOCD IN RCRD: CPT | Mod: CPTII,S$GLB,, | Performed by: UROLOGY

## 2024-11-27 PROCEDURE — 1101F PT FALLS ASSESS-DOCD LE1/YR: CPT | Mod: CPTII,S$GLB,, | Performed by: UROLOGY

## 2024-11-27 PROCEDURE — 99999 PR PBB SHADOW E&M-EST. PATIENT-LVL III: CPT | Mod: PBBFAC,,, | Performed by: UROLOGY

## 2024-11-27 NOTE — PROGRESS NOTES
CC: follow up prostate cancer    History of Present Illness:   Rahul FOSTER Parent is a 74 y.o. male here for follow up.     11/27/24-PSA continues to decline. Good stream. No dysuria. No gross hematuria. No urgency. No recent flank pain.    5/22/24-feels back to normal following XRT. Completed it about 3 months ago. No dysuria or gross hematuria. No recent stones. No flank or abdominal pain.     1/24/24-Getting XRT, today is day 8/28. He has frequency, no dysuria. No gross hematuria. Lumbar spine X-ray personally reviewed from 11/16/23, noting a small L lower pole renal stone. No flank pain.     10/24/23-PSA continues to trend upward. No dysuria or gross hematuria. No bone pain. Denies weak stream and stranguria.     7/21/23-decipher test not completed due to not enough tissue. No new complaints. No flank pain. Stream is good.     6/9/23-UroNav path shows Martín 3+3=6 in 1 core, 3% of tissue sampled. Pt did well after biopsy. No fever. No longer having hematuria. KUB also personally reviewed, showing L lower pole renal stones, which appear stable. Renal US shows stable L hydronephrosis.    4/12/23- MRI prostate read as PI RADS 4 with lesion in the right posterior medial peripheral zone. I have personally reviewed these images. No new complaints. No gross hematuria, urgency, frequency or stranguria.     3/14/23-Stream is good. No gross hematuria. No nocturia. No gross hematuria. No urgency.   12/7/22- Renal US personally reviewed, showing stable L hydro. Non-obstructing L renal stones also seen, measuring up to 11mm. KUB personally reviewed, showing numerous gallstones. No recent stone passage. No flank pain. No RUQ pain. Good stream. No gross hematuria.   6/7/22- TRUS biopsy benign. Not much gross hematuria, now resolved. No fever or dysuria. No perineal pain. Minimal rectal bleeding. Denies flank pain or RUQ pain/bloating  5/24/22- Here for TRUS biopsy, 45g prostate. Renal US personally reviewed, stable.  Cholelithiasis. Pt denies RUQ pain.   4/5/22-MRI read as PI RADS 2. 47cc gland. Good stream, no stranguria. No gross hematuria. No flank pain. Decided that he wanted to further watch the PSA before deciding on biopsy.   6/3/21-States that he is doing fine. No pain. No recent passage of stones. Hasn't had stone passage in at least 10-15 years. No gross hematuria. Stream is good. No stranguria/hesitancy/nocturia. Not sure if he was aware of gallstones in the past.   KUB 5/2021 personally reviewed, showing 1.3cm LLP renal stone, cholelithiasis  Renal US with stable L hdyro    Per Ernst:  5/25/20: PSA bouncing a bit but still at baseline.  Reviewed history in detail. US last year shows a 1.1 cm left lower pole stone.  11/26/18: Mag3 is normal.  40/60 L/R function with acceptable drainage curves.  [facility] [employer] [facility].  Reviewed history in detail.  6/14/18: No complaints at all, no flank pain after stent removal.  PSA normal.  3/21/18: Cysto/stent pull today uneventful  3/12/18: Left URS shows no obvious UPJ obstruction, no masses, kidney relatively normal.  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    2/21/18: CT Urogram shows a high inserting ureter and obvious poor left function.  No way to say when this happened.  1/24/18: CT showed a left hydronephrosis with no obstructing UPJ stone, possibly congenital, possibly changes from stones.  No sig stones now to treat.  1/18/17: 65 yo pt of Dr. Valentino here for annual exam.  Feels well no complaints.  PSA has bumped at times, no biopsies.  Had a kidney stone 10 years ago he passed on his own.  Had other stones before that.    Review of Systems   Constitutional:  Negative for chills and fever.   HENT:  Positive for congestion.    Respiratory:  Negative for shortness of breath.    Cardiovascular:  Negative for chest pain.   Gastrointestinal:  Negative for abdominal pain.   Genitourinary:  Negative for flank pain and hematuria.    All other systems reviewed and are negative.      Current Outpatient Medications   Medication Sig Dispense Refill    aspirin (ECOTRIN) 81 MG EC tablet Take 81 mg by mouth once daily.      atorvastatin (LIPITOR) 40 MG tablet Take 0.5 tablets (20 mg total) by mouth once daily. 45 tablet 0    docusate sodium (COLACE) 100 MG capsule Take 1 capsule (100 mg total) by mouth 2 (two) times daily. 60 capsule 0    gemfibroziL (LOPID) 600 MG tablet Take 1 tablet (600 mg total) by mouth 2 (two) times daily before meals. 180 tablet 3    lisinopriL 10 MG tablet Take 1 tablet by mouth ONCE A DAY 90 tablet 3     No current facility-administered medications for this visit.       Review of patient's allergies indicates:  No Known Allergies    Past medical, family, and social history reviewed as documented in chart with pertinent positive medical, family, and social history detailed in HPI.    Physical Exam  Vitals:    11/27/24 1306   BP: 131/81   Pulse: 69   Resp: 16       General: Well-developed, well-nourished, in no acute distress  HEENT: Normocephalic, atraumatic, extraocular eye movements in tact  Neck: supple, trachea midline, no cervical or supraclavicular adenopathy  Respirations: Even and unlabored  Abdomen: soft, NTND, no masses, rebound or guarding  Rectal: 6/7/22-40gram prostate without nodules or tenderness. No gross blood.   Extremities: Moves all extremities equally, atraumatic, no clubbing, cyanosis, edema  Skin: warm and dry, no lesions  Psych: normal affect  Neuro: Alert and oriented x 3. Cranial nerves II-XII grossly intact    PSA    11/26/18: 4.1  11/26/19: 6.4  5/20/20: 4.3  6/3/21: 6.6  7/7/21: 5.2  4/5/22: 6.6  5/23/22: 7.4  12/7/22: 10.7  3/7/23: 9.6  6/7/23: 11.7  10/20/23: 13.7  5/15/24: 5.3  11/22/24: 1.4    Assessment:   1. Prostate cancer  PROSTATE SPECIFIC ANTIGEN, DIAGNOSTIC      2. Renal stone  POCT Urinalysis, Dipstick, Automated, W/O Scope    US Kidney                        Plan:  Prostate  cancer  -     PROSTATE SPECIFIC ANTIGEN, DIAGNOSTIC; Future; Expected date: 05/27/2025    Renal stone  -     POCT Urinalysis, Dipstick, Automated, W/O Scope  -     US Kidney; Future; Expected date: 05/27/2025        Follow up in about 6 months (around 5/27/2025).

## 2024-12-05 DIAGNOSIS — I10 ESSENTIAL HYPERTENSION: ICD-10-CM

## 2024-12-05 NOTE — TELEPHONE ENCOUNTER
Refill Routing Note   Medication(s) are not appropriate for processing by [facility] for the following reason(s):        Required labs outdated    [facility] action(s):  Defer   Requires appointment : Yes             Appointments  past 12m or future 3m with PCP    Date Provider   Last Visit   11/16/2023 Tyler Glez MD   Next Visit   Visit date not found Tyler Glez MD   ED visits in past 90 days: 0        Note composed:5:22 PM 12/05/2024

## 2024-12-05 NOTE — TELEPHONE ENCOUNTER
Care Due:                  Date            Visit Type   Department     Provider  --------------------------------------------------------------------------------                                ESTABLISHED   [facility] INTERNAL  Last Visit: 11-      PATIENT      MEDICINE       Tyler Glez  Next Visit: None Scheduled  None         None Found                                                            Last  Test          Frequency    Reason                     Performed    Due Date  --------------------------------------------------------------------------------    Office Visit  15 months..  atorvastatin,              11- 02-                             gemfibroziL, lisinopriL..    [facility] Embedded Care Due Messages. Reference number: [id number].   12/05/2024 2:29:58 PM CST

## 2024-12-06 RX ORDER — LISINOPRIL 10 MG/1
10 TABLET ORAL DAILY
Qty: 90 TABLET | Refills: 0 | Status: SHIPPED | OUTPATIENT
Start: 2024-12-06

## 2024-12-06 NOTE — TELEPHONE ENCOUNTER
Patient overdue for follow up.  Refill is given but the patient needs an appointment with Dr. Glez or Bridger Cortez NP, for follow up.

## 2024-12-12 DIAGNOSIS — E78.2 MIXED HYPERLIPIDEMIA: ICD-10-CM

## 2024-12-12 NOTE — TELEPHONE ENCOUNTER
No care due was identified.  [facility] Embedded Care Due Messages. Reference number: [id number].   12/12/2024 1:35:55 PM CST

## 2024-12-12 NOTE — TELEPHONE ENCOUNTER
Refill Routing Note   Medication(s) are not appropriate for processing by [facility] for the following reason(s):        Required labs outdated    [facility] action(s):  Defer               Appointments  past 12m or future 3m with PCP    Date Provider   Last Visit   11/16/2023 Tyler Glez MD   Next Visit   Visit date not found Tyler Glez MD   ED visits in past 90 days: 0        Note composed:5:07 PM 12/12/2024

## 2024-12-13 RX ORDER — GEMFIBROZIL 600 MG/1
600 TABLET, FILM COATED ORAL
Qty: 180 TABLET | Refills: 0 | Status: SHIPPED | OUTPATIENT
Start: 2024-12-13

## 2025-03-03 DIAGNOSIS — I10 ESSENTIAL HYPERTENSION: ICD-10-CM

## 2025-03-03 NOTE — TELEPHONE ENCOUNTER
Care Due:                  Date            Visit Type   Department     Provider  --------------------------------------------------------------------------------                                ESTABLISHED   [facility] INTERNAL  Last Visit: 11-      PATIENT      MEDICINE       Tyler Glez  Next Visit: None Scheduled  None         None Found                                                            Last  Test          Frequency    Reason                     Performed    Due Date  --------------------------------------------------------------------------------    Office Visit  15 months..  atorvastatin,              11- 02-                             gemfibroziL, lisinopriL..    CBC.........  12 months..  gemfibroziL..............  11- 11-    CMP.........  12 months..  atorvastatin,              11- 11-                             gemfibroziL, lisinopriL..    Lipid Panel.  12 months..  atorvastatin, gemfibroziL  11- 11-    [facility] Embedded Care Due Messages. Reference number: [id number].   3/03/2025 9:05:17 AM CST

## 2025-03-04 RX ORDER — LISINOPRIL 10 MG/1
10 TABLET ORAL
Qty: 90 TABLET | Refills: 0 | Status: SHIPPED | OUTPATIENT
Start: 2025-03-04

## 2025-03-04 NOTE — TELEPHONE ENCOUNTER
Refill Routing Note   Medication(s) are not appropriate for processing by [facility] for the following reason(s):        Patient not seen by provider within 15 months  Required labs outdated    [facility] action(s):  Defer   Requires appointment : Yes     Requires labs : Yes             Appointments  past 12m or future 3m with PCP    Date Provider   Last Visit   11/16/2023 Tyler Glez MD   Next Visit   Visit date not found Tyler Glez MD   ED visits in past 90 days: 0        Note composed:7:54 PM 03/03/2025

## 2025-04-10 DIAGNOSIS — E78.2 MIXED HYPERLIPIDEMIA: ICD-10-CM

## 2025-04-10 RX ORDER — ATORVASTATIN CALCIUM 40 MG/1
20 TABLET, FILM COATED ORAL DAILY
Qty: 45 TABLET | Refills: 0 | Status: SHIPPED | OUTPATIENT
Start: 2025-04-10

## 2025-04-10 NOTE — TELEPHONE ENCOUNTER
No care due was identified.  [facility] Embedded Care Due Messages. Reference number: [id number].   4/10/2025 1:56:40 PM CDT

## 2025-04-10 NOTE — TELEPHONE ENCOUNTER
Refill Routing Note   Medication(s) are not appropriate for processing by [facility] for the following reason(s):        Required labs outdated    [facility] action(s):  Defer               Appointments  past 12m or future 3m with PCP    Date Provider   Last Visit   11/16/2023 Tyler Glez MD   Next Visit   Visit date not found Tyler Glez MD   ED visits in past 90 days: 0        Note composed:2:41 PM 04/10/2025

## 2025-05-28 ENCOUNTER — OFFICE VISIT (OUTPATIENT)
Dept: UROLOGY | Facility: CLINIC | Age: 75
End: 2025-05-28
Payer: MEDICARE

## 2025-05-28 ENCOUNTER — HOSPITAL ENCOUNTER (OUTPATIENT)
Dept: RADIOLOGY | Facility: HOSPITAL | Age: 75
Discharge: HOME | End: 2025-05-28
Attending: UROLOGY
Payer: MEDICARE

## 2025-05-28 VITALS
WEIGHT: 169.06 LBS | BODY MASS INDEX: 25.62 KG/M2 | HEIGHT: 68 IN | SYSTOLIC BLOOD PRESSURE: 94 MMHG | RESPIRATION RATE: 16 BRPM | HEART RATE: 66 BPM | DIASTOLIC BLOOD PRESSURE: 68 MMHG

## 2025-05-28 DIAGNOSIS — N20.0 RENAL STONE: ICD-10-CM

## 2025-05-28 DIAGNOSIS — Q62.11 HYDRONEPHROSIS WITH URETEROPELVIC JUNCTION (UPJ) OBSTRUCTION: Primary | ICD-10-CM

## 2025-05-28 DIAGNOSIS — C61 PROSTATE CANCER: ICD-10-CM

## 2025-05-28 LAB
BILIRUBIN, UA POC OHS: NEGATIVE
BLOOD, UA POC OHS: NEGATIVE
CLARITY, UA POC OHS: CLEAR
COLOR, UA POC OHS: YELLOW
GLUCOSE, UA POC OHS: NEGATIVE
KETONES, UA POC OHS: NEGATIVE
LEUKOCYTES, UA POC OHS: NEGATIVE
NITRITE, UA POC OHS: NEGATIVE
PH, UA POC OHS: 6
PROTEIN, UA POC OHS: NEGATIVE
SPECIFIC GRAVITY, UA POC OHS: 1.02
UROBILINOGEN, UA POC OHS: 0.2

## 2025-05-28 PROCEDURE — 1126F AMNT PAIN NOTED NONE PRSNT: CPT | Mod: CPTII,S$GLB,, | Performed by: UROLOGY

## 2025-05-28 PROCEDURE — 3074F SYST BP LT 130 MM HG: CPT | Mod: CPTII,S$GLB,, | Performed by: UROLOGY

## 2025-05-28 PROCEDURE — 99999 PR PBB SHADOW E&M-EST. PATIENT-LVL III: CPT | Mod: PBBFAC,,, | Performed by: UROLOGY

## 2025-05-28 PROCEDURE — 99214 OFFICE O/P EST MOD 30 MIN: CPT | Mod: S$GLB,,, | Performed by: UROLOGY

## 2025-05-28 PROCEDURE — 1101F PT FALLS ASSESS-DOCD LE1/YR: CPT | Mod: CPTII,S$GLB,, | Performed by: UROLOGY

## 2025-05-28 PROCEDURE — 3288F FALL RISK ASSESSMENT DOCD: CPT | Mod: CPTII,S$GLB,, | Performed by: UROLOGY

## 2025-05-28 PROCEDURE — 81003 URINALYSIS AUTO W/O SCOPE: CPT | Mod: QW,S$GLB,, | Performed by: UROLOGY

## 2025-05-28 PROCEDURE — 3078F DIAST BP <80 MM HG: CPT | Mod: CPTII,S$GLB,, | Performed by: UROLOGY

## 2025-05-28 PROCEDURE — 76770 US EXAM ABDO BACK WALL COMP: CPT | Mod: 26,,, | Performed by: RADIOLOGY

## 2025-05-28 PROCEDURE — 76770 US EXAM ABDO BACK WALL COMP: CPT | Mod: TC,PN

## 2025-05-28 PROCEDURE — 1159F MED LIST DOCD IN RCRD: CPT | Mod: CPTII,S$GLB,, | Performed by: UROLOGY

## 2025-05-28 PROCEDURE — 1160F RVW MEDS BY RX/DR IN RCRD: CPT | Mod: CPTII,S$GLB,, | Performed by: UROLOGY

## 2025-05-28 NOTE — PROGRESS NOTES
CC: follow up prostate cancer    History of Present Illness:   Rahul FOSTER Parent is a 74 y.o. male here for follow up.     5/28/25-Denies gross hematuria, flank pain or abdominal pain. No urgency or incontinence.     11/27/24-PSA continues to decline. Good stream. No dysuria. No gross hematuria. No urgency. No recent flank pain.    5/22/24-feels back to normal following XRT. Completed it about 3 months ago. No dysuria or gross hematuria. No recent stones. No flank or abdominal pain.     1/24/24-Getting XRT, today is day 8/28. He has frequency, no dysuria. No gross hematuria. Lumbar spine X-ray personally reviewed from 11/16/23, noting a small L lower pole renal stone. No flank pain.     10/24/23-PSA continues to trend upward. No dysuria or gross hematuria. No bone pain. Denies weak stream and stranguria.     7/21/23-decipher test not completed due to not enough tissue. No new complaints. No flank pain. Stream is good.     6/9/23-UroNav path shows [place] 3+3=6 in 1 core, 3% of tissue sampled. Pt did well after biopsy. No fever. No longer having hematuria. KUB also personally reviewed, showing L lower pole renal stones, which appear stable. Renal US shows stable L hydronephrosis.    4/12/23- MRI prostate read as PI RADS 4 with lesion in the right posterior medial peripheral zone. I have personally reviewed these images. No new complaints. No gross hematuria, urgency, frequency or stranguria.     3/14/23-Stream is good. No gross hematuria. No nocturia. No gross hematuria. No urgency.   12/7/22- Renal US personally reviewed, showing stable L hydro. Non-obstructing L renal stones also seen, measuring up to 11mm. KUB personally reviewed, showing numerous gallstones. No recent stone passage. No flank pain. No RUQ pain. Good stream. No gross hematuria.   6/7/22- TRUS biopsy benign. Not much gross hematuria, now resolved. No fever or dysuria. No perineal pain. Minimal rectal bleeding. Denies flank pain or RUQ  pain/bloating  5/24/22- Here for TRUS biopsy, 45g prostate. Renal US personally reviewed, stable. Cholelithiasis. Pt denies RUQ pain.   4/5/22-MRI read as PI RADS 2. 47cc gland. Good stream, no stranguria. No gross hematuria. No flank pain. Decided that he wanted to further watch the PSA before deciding on biopsy.   6/3/21-States that he is doing fine. No pain. No recent passage of stones. Hasn't had stone passage in at least 10-15 years. No gross hematuria. Stream is good. No stranguria/hesitancy/nocturia. Not sure if he was aware of gallstones in the past.   KUB 5/2021 personally reviewed, showing 1.3cm LLP renal stone, cholelithiasis  Renal US with stable L hdyro    Per Ernst:  5/25/20: PSA bouncing a bit but still at baseline.  Reviewed history in detail. US last year shows a 1.1 cm left lower pole stone.  11/26/18: Mag3 is normal.  40/60 L/R function with acceptable drainage curves.  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  Reviewed history in detail.  6/14/18: No complaints at all, no flank pain after stent removal.  PSA normal.  3/21/18: Cysto/stent pull today uneventful  3/12/18: Left URS shows no obvious UPJ obstruction, no masses, kidney relatively normal.  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    2/21/18: CT Urogram shows a high inserting ureter and obvious poor left function.  No way to say when this happened.  1/24/18: CT showed a left hydronephrosis with no obstructing UPJ stone, possibly congenital, possibly changes from stones.  No sig stones now to treat.  1/18/17: 65 yo pt of Dr. Valentino here for annual exam.  Feels well no complaints.  PSA has bumped at times, no biopsies.  Had a kidney stone 10 years ago he passed on his own.  Had other stones before that.    Review of Systems   Constitutional:  Negative for chills and fever.   HENT:  Positive for congestion.    Respiratory:  Negative for shortness of breath.    Cardiovascular:  Negative for chest pain.    Gastrointestinal:  Negative for abdominal pain.   Genitourinary:  Negative for flank pain and hematuria.   All other systems reviewed and are negative.      Current Outpatient Medications   Medication Sig Dispense Refill    aspirin (ECOTRIN) 81 MG EC tablet Take 81 mg by mouth once daily.      atorvastatin (LIPITOR) 40 MG tablet Take 0.5 tablets (20 mg total) by mouth once daily. 45 tablet 0    docusate sodium (COLACE) 100 MG capsule Take 1 capsule (100 mg total) by mouth 2 (two) times daily. 60 capsule 0    gemfibroziL (LOPID) 600 MG tablet TAKE 1 TABLET BY MOUTH TWICE DAILY before meals 180 tablet 0    lisinopriL 10 MG tablet Take 1 tablet by mouth ONCE A DAY 90 tablet 0     No current facility-administered medications for this visit.       Review of patient's allergies indicates:  No Known Allergies    Past medical, family, and social history reviewed as documented in chart with pertinent positive medical, family, and social history detailed in HPI.    Physical Exam  Vitals:    05/28/25 1109   BP: 94/68   Pulse: 66   Resp: 16       General: Well-developed, well-nourished, in no acute distress  HEENT: Normocephalic, atraumatic, extraocular eye movements in tact  Neck: supple, trachea midline, no cervical or supraclavicular adenopathy  Respirations: Even and unlabored  Abdomen: soft, NTND, no masses, rebound or guarding  Rectal: 6/7/22-40gram prostate without nodules or tenderness. No gross blood.   Extremities: Moves all extremities equally, atraumatic, no clubbing, cyanosis, edema  Skin: warm and dry, no lesions  Psych: normal affect  Neuro: Alert and oriented x 3. Cranial nerves II-XII grossly intact    PSA    11/26/18: 4.1  11/26/19: 6.4  5/20/20: 4.3  6/3/21: 6.6  7/7/21: 5.2  4/5/22: 6.6  5/23/22: 7.4  12/7/22: 10.7  3/7/23: 9.6  6/7/23: 11.7  10/20/23: 13.7  5/15/24: 5.3  11/22/24: 1.4  5/28/25: 1.98      Renal US personally reviewed today and agree with official read:   US Retroperitoneal  Complete  Narrative: EXAMINATION:  US RETROPERITONEAL COMPLETE    CLINICAL HISTORY:  Calculus of kidney    TECHNIQUE:  Ultrasound of the kidneys and urinary bladder was performed including color flow and Doppler evaluation of the kidneys.    COMPARISON:  05/29/2024, 02/19/2018    FINDINGS:  Right kidney: The right kidney measures 11.2 cm. No cortical thinning. No loss of corticomedullary distinction. Resistive index measures 0.70.  No mass. No renal stone. No hydronephrosis.    Left kidney: The left kidney measures 13 cm.  No loss of corticomedullary distinction. Resistive index measures 0.67.  No mass. Left lower pole 11 mm nonobstructing renal stone.  Unchanged chronic appearing moderate left hydronephrosis with associated cortical thinning compared to ultrasound 05/29/2024 and CT urogram 02/19/2018.    The bladder is partially distended at the time of scanning and has an unremarkable appearance.  Impression: No acute abnormality.    Left lower pole 11 mm nonobstructing renal stone.  Unchanged chronic appearing moderate left hydronephrosis with associated cortical thinning compared to ultrasound 05/29/2024 and CT urogram 02/19/2018.    Electronically signed by: Olu Sanchez  Date:    05/28/2025  Time:    11:01        Assessment:   1. Hydronephrosis with ureteropelvic junction (UPJ) obstruction  NM Renogram With Lasix    POCT Urinalysis(Instrument)      2. Prostate cancer        3. Renal stone                  Plan:  Hydronephrosis with ureteropelvic junction (UPJ) obstruction  -     NM Renogram With Lasix; Future; Expected date: 05/28/2025  -     POCT Urinalysis(Instrument)    Prostate cancer    Renal stone          Follow up for renal scan results.

## 2025-06-02 DIAGNOSIS — I10 ESSENTIAL HYPERTENSION: ICD-10-CM

## 2025-06-03 RX ORDER — LISINOPRIL 10 MG/1
10 TABLET ORAL DAILY
Qty: 90 TABLET | Refills: 0 | Status: SHIPPED | OUTPATIENT
Start: 2025-06-03

## 2025-06-30 DIAGNOSIS — E78.2 MIXED HYPERLIPIDEMIA: ICD-10-CM

## 2025-06-30 NOTE — TELEPHONE ENCOUNTER
No care due was identified.  [facility] Embedded Care Due Messages. Reference number: [id number].   6/30/2025 1:54:34 PM CDT

## 2025-07-01 RX ORDER — GEMFIBROZIL 600 MG/1
600 TABLET, FILM COATED ORAL
Qty: 180 TABLET | Refills: 0 | Status: SHIPPED | OUTPATIENT
Start: 2025-07-01

## 2025-07-01 NOTE — TELEPHONE ENCOUNTER
Refill Routing Note   Medication(s) are not appropriate for processing by [facility] for the following reason(s):        Patient not seen by provider within 15 months  Required labs outdated    [facility] action(s):  Defer               Appointments  past 12m or future 3m with PCP    Date Provider   Last Visit   11/16/2023 Tyler Glez MD   Next Visit   Visit date not found Tyler Glez MD   ED visits in past 90 days: 0        Note composed:10:16 PM 06/30/2025

## 2025-07-16 ENCOUNTER — TELEPHONE (OUTPATIENT)
Dept: UROLOGY | Facility: CLINIC | Age: 75
End: 2025-07-16
Payer: MEDICARE

## 2025-07-16 NOTE — TELEPHONE ENCOUNTER
.Outgoing call placed to patient, patient verified identifiers, patient stated he rescheduled his imaging and wanted to reschedule the follow up, appt scheduled as requested and no further assistance needed.       Copied from CRM #[id number]. Topic: General Inquiry - Return Call  >> Jul 16, 2025 11:14 AM Gris wrote:  .Type:  Patient  Call    Who Called:Rahul  Does the patient know what this is regarding?:pt needs a nurse to reach back out to him  Would the patient rather a call back or a response via [facility]? Call back  Best Call Back Number:[phone]  Additional Information:

## 2025-07-16 NOTE — TELEPHONE ENCOUNTER
.Outgoing call placed to patient, patient verified identifiers, patient stated he was not able to do the imaging so does he need the appt, notified that appt was to review the imaging, he verbalized understanding and wants to cancel the appts as of now and will contact us when he is ready to reschedule. No further assistance needed.       Copied from CRM #[id number]. Topic: General Inquiry - Return Call  >> Jul 16, 2025 10:38 AM Bridgette wrote:  Type:  Patient Call Back     Who Called: Rahul   Who Left Message for Patient: Rahul   Does the patient know what this is regarding?: the caller is needing to know that he was not able to make your appointment on 07/16/2025 and he wanted to know if that would affect the appointment that he has with you on 07/22/2025 and if so he would need to reschedule the appointment.  Please advise.  Would the patient rather a call back or a response via [facility]? Call back   Best Call Back Number: [phone] (home)    Additional Information:     Thanks,  REX

## 2025-07-31 DIAGNOSIS — E78.2 MIXED HYPERLIPIDEMIA: ICD-10-CM

## 2025-07-31 RX ORDER — ATORVASTATIN CALCIUM 40 MG/1
20 TABLET, FILM COATED ORAL DAILY
Qty: 45 TABLET | Refills: 0 | Status: SHIPPED | OUTPATIENT
Start: 2025-07-31

## 2025-07-31 NOTE — TELEPHONE ENCOUNTER
Copied from CRM #[id number]. Topic: Medications - Medication Refill  >> Jul 31, 2025 11:11 AM Geovanna wrote:  Type: RX Refill Request    Who Called: pt    Have you contacted your pharmacy:    Refill or New Rx:refill    RX Name and Strength:atorvastatin (LIPITOR) 40 MG tablet    How is the patient currently taking it? (ex. 1XDay):1x day    Is this a 30 day or 90 day RX:    Preferred Pharmacy with phone number:  [place], [place] - [address] [facility] [address]  [id number] [facility] [address]  [facility] [address]  Phone: [phone] Fax: [phone]    Local or Mail Order:local    Ordering Provider:Tyler Glez MD    Would the patient rather a call back or a response via [facility]? Call back    Best Call Back Number:Telephone Information:  Mobile          [phone]    Additional Information:

## 2025-07-31 NOTE — TELEPHONE ENCOUNTER
No care due was identified.  [facility] Embedded Care Due Messages. Reference number: [id number].   7/31/2025 3:21:10 PM CDT

## 2025-07-31 NOTE — TELEPHONE ENCOUNTER
Refill Routing Note   Medication(s) are not appropriate for processing by [facility] for the following reason(s):        Patient not seen by provider within 15 months  Required labs outdated    [facility] action(s):  Defer               Appointments  past 12m or future 3m with PCP    Date Provider   Last Visit   11/16/2023 Tyler Glez MD   Next Visit   Visit date not found Tyler Glez MD   ED visits in past 90 days: 0        Note composed:3:36 PM 07/31/2025

## 2025-08-04 ENCOUNTER — HOSPITAL ENCOUNTER (OUTPATIENT)
Dept: RADIOLOGY | Facility: HOSPITAL | Age: 75
Discharge: HOME | End: 2025-08-04
Attending: UROLOGY
Payer: MEDICARE

## 2025-08-04 VITALS — HEART RATE: 64 BPM | DIASTOLIC BLOOD PRESSURE: 74 MMHG | RESPIRATION RATE: 16 BRPM | SYSTOLIC BLOOD PRESSURE: 136 MMHG

## 2025-08-04 DIAGNOSIS — Q62.11 HYDRONEPHROSIS WITH URETEROPELVIC JUNCTION (UPJ) OBSTRUCTION: ICD-10-CM

## 2025-08-04 PROCEDURE — A9562 TC99M MERTIATIDE: HCPCS | Performed by: UROLOGY

## 2025-08-04 PROCEDURE — 78708 K FLOW/FUNCT IMAGE W/DRUG: CPT | Mod: 26,,, | Performed by: RADIOLOGY

## 2025-08-04 PROCEDURE — 78708 K FLOW/FUNCT IMAGE W/DRUG: CPT | Mod: TC

## 2025-08-04 PROCEDURE — 63600175 PHARM REV CODE 636 W HCPCS: Performed by: RADIOLOGY

## 2025-08-04 RX ORDER — BETIATIDE 1 MG/1
10 INJECTION, POWDER, LYOPHILIZED, FOR SOLUTION INTRAVENOUS
Status: COMPLETED | OUTPATIENT
Start: 2025-08-04 | End: 2025-08-04

## 2025-08-04 RX ORDER — FUROSEMIDE 10 MG/ML
40 INJECTION INTRAMUSCULAR; INTRAVENOUS ONCE
Status: COMPLETED | OUTPATIENT
Start: 2025-08-04 | End: 2025-08-04

## 2025-08-04 RX ADMIN — TECHNESCAN TC 99M MERTIATIDE 10 MILLICURIE: 1 INJECTION, POWDER, LYOPHILIZED, FOR SOLUTION INTRAVENOUS at 10:08

## 2025-08-04 RX ADMIN — FUROSEMIDE 40 MG: 10 INJECTION, SOLUTION INTRAMUSCULAR; INTRAVENOUS at 10:08

## 2025-08-06 ENCOUNTER — OFFICE VISIT (OUTPATIENT)
Dept: UROLOGY | Facility: CLINIC | Age: 75
End: 2025-08-06
Payer: MEDICARE

## 2025-08-06 ENCOUNTER — LAB VISIT (OUTPATIENT)
Dept: LAB | Facility: HOSPITAL | Age: 75
End: 2025-08-06
Attending: UROLOGY
Payer: MEDICARE

## 2025-08-06 VITALS
HEIGHT: 68 IN | BODY MASS INDEX: 25.46 KG/M2 | HEART RATE: 62 BPM | WEIGHT: 168 LBS | SYSTOLIC BLOOD PRESSURE: 107 MMHG | RESPIRATION RATE: 18 BRPM | DIASTOLIC BLOOD PRESSURE: 66 MMHG

## 2025-08-06 DIAGNOSIS — C61 PROSTATE CANCER: ICD-10-CM

## 2025-08-06 DIAGNOSIS — N20.0 RENAL STONE: ICD-10-CM

## 2025-08-06 DIAGNOSIS — Q62.11 HYDRONEPHROSIS WITH URETEROPELVIC JUNCTION (UPJ) OBSTRUCTION: Primary | ICD-10-CM

## 2025-08-06 LAB
BILIRUBIN, UA POC OHS: NEGATIVE
BLOOD, UA POC OHS: NEGATIVE
CLARITY, UA POC OHS: CLEAR
COLOR, UA POC OHS: YELLOW
GLUCOSE, UA POC OHS: NEGATIVE
KETONES, UA POC OHS: NEGATIVE
LEUKOCYTES, UA POC OHS: NEGATIVE
NITRITE, UA POC OHS: NEGATIVE
PH, UA POC OHS: 6
PROTEIN, UA POC OHS: NEGATIVE
PSA SERPL-MCNC: 1.77 NG/ML
SPECIFIC GRAVITY, UA POC OHS: 1.01
UROBILINOGEN, UA POC OHS: 0.2

## 2025-08-06 PROCEDURE — 84153 ASSAY OF PSA TOTAL: CPT

## 2025-08-06 PROCEDURE — 1101F PT FALLS ASSESS-DOCD LE1/YR: CPT | Mod: CPTII,S$GLB,, | Performed by: UROLOGY

## 2025-08-06 PROCEDURE — 1159F MED LIST DOCD IN RCRD: CPT | Mod: CPTII,S$GLB,, | Performed by: UROLOGY

## 2025-08-06 PROCEDURE — 36415 COLL VENOUS BLD VENIPUNCTURE: CPT | Mod: PN

## 2025-08-06 PROCEDURE — 3288F FALL RISK ASSESSMENT DOCD: CPT | Mod: CPTII,S$GLB,, | Performed by: UROLOGY

## 2025-08-06 PROCEDURE — 3078F DIAST BP <80 MM HG: CPT | Mod: CPTII,S$GLB,, | Performed by: UROLOGY

## 2025-08-06 PROCEDURE — 1126F AMNT PAIN NOTED NONE PRSNT: CPT | Mod: CPTII,S$GLB,, | Performed by: UROLOGY

## 2025-08-06 PROCEDURE — 99213 OFFICE O/P EST LOW 20 MIN: CPT | Mod: S$GLB,,, | Performed by: UROLOGY

## 2025-08-06 PROCEDURE — 3074F SYST BP LT 130 MM HG: CPT | Mod: CPTII,S$GLB,, | Performed by: UROLOGY

## 2025-08-06 PROCEDURE — 81003 URINALYSIS AUTO W/O SCOPE: CPT | Mod: QW,S$GLB,, | Performed by: UROLOGY

## 2025-08-06 PROCEDURE — 99999 PR PBB SHADOW E&M-EST. PATIENT-LVL III: CPT | Mod: PBBFAC,,, | Performed by: UROLOGY

## 2025-08-30 DIAGNOSIS — I10 ESSENTIAL HYPERTENSION: ICD-10-CM

## 2025-09-02 RX ORDER — LISINOPRIL 10 MG/1
10 TABLET ORAL DAILY
Qty: 90 TABLET | Refills: 0 | Status: SHIPPED | OUTPATIENT
Start: 2025-09-02

## (undated) DEVICE — SOL IRR NACL .9% 3000ML

## (undated) DEVICE — GUIDEWIRE AMPLATZ .035X145CM

## (undated) DEVICE — ADHESIVE MASTISOL VIAL 48/BX

## (undated) DEVICE — SEE MEDLINE ITEM 154981

## (undated) DEVICE — SKIN MARKER DEVON 160

## (undated) DEVICE — SYR ONLY LUER LOCK 20CC

## (undated) DEVICE — SET IRR URLGY 2LINE UNIV SPIKE

## (undated) DEVICE — SEE MEDLINE ITEM 157185

## (undated) DEVICE — SEE MEDLINE ITEM 152622

## (undated) DEVICE — UROVIEW 2600/2800

## (undated) DEVICE — SPONGE GAUZE 16PLY 4X4

## (undated) DEVICE — MANIFOLD 4 PORT

## (undated) DEVICE — SOL WATER STRL IRR 1000ML

## (undated) DEVICE — CANISTER SUCTION MEDI-VAC 12L

## (undated) DEVICE — GLOVE PROTEXIS HYDROGEL SZ8

## (undated) DEVICE — SEE MEDLINE ITEM 152487

## (undated) DEVICE — SHEATH URTRL ACCESS 14F 35CM

## (undated) DEVICE — ADAPTER TUOHY BORST 6FR

## (undated) DEVICE — CLOSURE SKIN STERI STRIP 1/2X4

## (undated) DEVICE — CONTAINER SPECIMEN STRL 4OZ

## (undated) DEVICE — GOWN SMARTGOWN LVL4 X-LONG XL

## (undated) DEVICE — WIRE GUIDE 0.038OLD

## (undated) DEVICE — ELECTRODE REM PLYHSV RETURN 9